# Patient Record
Sex: MALE | Race: WHITE | Employment: OTHER | URBAN - METROPOLITAN AREA
[De-identification: names, ages, dates, MRNs, and addresses within clinical notes are randomized per-mention and may not be internally consistent; named-entity substitution may affect disease eponyms.]

---

## 2017-01-16 ENCOUNTER — TELEPHONE (OUTPATIENT)
Dept: FAMILY MEDICINE CLINIC | Age: 62
End: 2017-01-16

## 2017-01-16 RX ORDER — AZITHROMYCIN 250 MG/1
TABLET, FILM COATED ORAL
Qty: 1 PACKET | Refills: 0 | Status: SHIPPED | OUTPATIENT
Start: 2017-01-16 | End: 2017-01-26

## 2017-02-10 ENCOUNTER — TELEPHONE (OUTPATIENT)
Dept: CARDIOLOGY | Age: 62
End: 2017-02-10

## 2017-02-23 ENCOUNTER — OFFICE VISIT (OUTPATIENT)
Dept: CARDIOLOGY | Age: 62
End: 2017-02-23

## 2017-02-23 VITALS
HEART RATE: 82 BPM | WEIGHT: 214 LBS | SYSTOLIC BLOOD PRESSURE: 160 MMHG | HEIGHT: 75 IN | BODY MASS INDEX: 26.61 KG/M2 | DIASTOLIC BLOOD PRESSURE: 84 MMHG

## 2017-02-23 DIAGNOSIS — E78.01 FAMILIAL HYPERCHOLESTEROLEMIA: ICD-10-CM

## 2017-02-23 DIAGNOSIS — I25.810 CORONARY ARTERY DISEASE INVOLVING CORONARY BYPASS GRAFT OF NATIVE HEART WITHOUT ANGINA PECTORIS: ICD-10-CM

## 2017-02-23 DIAGNOSIS — I10 ESSENTIAL HYPERTENSION: Primary | ICD-10-CM

## 2017-02-23 PROCEDURE — G8427 DOCREV CUR MEDS BY ELIG CLIN: HCPCS | Performed by: NUCLEAR MEDICINE

## 2017-02-23 PROCEDURE — 1036F TOBACCO NON-USER: CPT | Performed by: NUCLEAR MEDICINE

## 2017-02-23 PROCEDURE — G8419 CALC BMI OUT NRM PARAM NOF/U: HCPCS | Performed by: NUCLEAR MEDICINE

## 2017-02-23 PROCEDURE — 99213 OFFICE O/P EST LOW 20 MIN: CPT | Performed by: NUCLEAR MEDICINE

## 2017-02-23 PROCEDURE — G8598 ASA/ANTIPLAT THER USED: HCPCS | Performed by: NUCLEAR MEDICINE

## 2017-02-23 PROCEDURE — G8484 FLU IMMUNIZE NO ADMIN: HCPCS | Performed by: NUCLEAR MEDICINE

## 2017-02-23 PROCEDURE — 3017F COLORECTAL CA SCREEN DOC REV: CPT | Performed by: NUCLEAR MEDICINE

## 2017-02-23 RX ORDER — LISINOPRIL 10 MG/1
10 TABLET ORAL DAILY
Qty: 90 TABLET | Refills: 3 | Status: SHIPPED | OUTPATIENT
Start: 2017-02-23 | End: 2017-04-21 | Stop reason: SDUPTHER

## 2017-04-21 DIAGNOSIS — I10 ESSENTIAL HYPERTENSION: ICD-10-CM

## 2017-04-21 DIAGNOSIS — I25.119 CORONARY ARTERY DISEASE WITH ANGINA PECTORIS, UNSPECIFIED VESSEL OR LESION TYPE, UNSPECIFIED WHETHER NATIVE OR TRANSPLANTED HEART (HCC): ICD-10-CM

## 2017-04-21 RX ORDER — ATORVASTATIN CALCIUM 40 MG/1
40 TABLET, FILM COATED ORAL EVERY OTHER DAY
Qty: 45 TABLET | Refills: 3 | Status: SHIPPED | OUTPATIENT
Start: 2017-04-21 | End: 2017-10-23 | Stop reason: SDUPTHER

## 2017-04-21 RX ORDER — LISINOPRIL 10 MG/1
10 TABLET ORAL DAILY
Qty: 90 TABLET | Refills: 3 | Status: SHIPPED | OUTPATIENT
Start: 2017-04-21 | End: 2017-10-23 | Stop reason: SDUPTHER

## 2017-04-21 RX ORDER — PRASUGREL 10 MG/1
TABLET, FILM COATED ORAL
Qty: 90 TABLET | Refills: 3 | Status: SHIPPED | OUTPATIENT
Start: 2017-04-21 | End: 2017-10-23 | Stop reason: SDUPTHER

## 2017-05-01 RX ORDER — TAMSULOSIN HYDROCHLORIDE 0.4 MG/1
0.4 CAPSULE ORAL NIGHTLY
Qty: 90 CAPSULE | Refills: 3 | OUTPATIENT
Start: 2017-05-01

## 2017-05-13 DIAGNOSIS — Z12.5 SCREENING FOR MALIGNANT NEOPLASM OF PROSTATE: ICD-10-CM

## 2017-05-13 DIAGNOSIS — E78.00 PURE HYPERCHOLESTEROLEMIA: Primary | ICD-10-CM

## 2017-05-13 RX ORDER — TAMSULOSIN HYDROCHLORIDE 0.4 MG/1
0.4 CAPSULE ORAL NIGHTLY
Qty: 90 CAPSULE | Refills: 3 | Status: SHIPPED | OUTPATIENT
Start: 2017-05-13 | End: 2017-05-15 | Stop reason: SDUPTHER

## 2017-05-15 RX ORDER — TAMSULOSIN HYDROCHLORIDE 0.4 MG/1
0.4 CAPSULE ORAL NIGHTLY
Qty: 90 CAPSULE | Refills: 3 | Status: CANCELLED | OUTPATIENT
Start: 2017-05-15

## 2017-05-15 RX ORDER — TAMSULOSIN HYDROCHLORIDE 0.4 MG/1
0.4 CAPSULE ORAL NIGHTLY
Qty: 90 CAPSULE | Refills: 3 | Status: SHIPPED | OUTPATIENT
Start: 2017-05-15 | End: 2018-04-03 | Stop reason: SDUPTHER

## 2017-05-16 ENCOUNTER — OFFICE VISIT (OUTPATIENT)
Dept: FAMILY MEDICINE CLINIC | Age: 62
End: 2017-05-16

## 2017-05-16 VITALS
DIASTOLIC BLOOD PRESSURE: 74 MMHG | BODY MASS INDEX: 25.74 KG/M2 | RESPIRATION RATE: 16 BRPM | HEART RATE: 64 BPM | WEIGHT: 207 LBS | SYSTOLIC BLOOD PRESSURE: 126 MMHG | HEIGHT: 75 IN

## 2017-05-16 DIAGNOSIS — Z12.11 SCREEN FOR COLON CANCER: ICD-10-CM

## 2017-05-16 DIAGNOSIS — I10 ESSENTIAL HYPERTENSION: ICD-10-CM

## 2017-05-16 DIAGNOSIS — Z00.00 WELL ADULT EXAM: Primary | ICD-10-CM

## 2017-05-16 LAB
HEMOCCULT STL QL: NORMAL

## 2017-05-16 PROCEDURE — 99396 PREV VISIT EST AGE 40-64: CPT | Performed by: FAMILY MEDICINE

## 2017-05-16 PROCEDURE — 82270 OCCULT BLOOD FECES: CPT | Performed by: FAMILY MEDICINE

## 2017-05-16 ASSESSMENT — ENCOUNTER SYMPTOMS
CONSTIPATION: 0
SINUS PRESSURE: 0
SHORTNESS OF BREATH: 1

## 2017-05-22 ENCOUNTER — EMPLOYEE WELLNESS (OUTPATIENT)
Dept: OTHER | Age: 62
End: 2017-05-22

## 2017-05-22 LAB
CHOLESTEROL, TOTAL: 143 MG/DL (ref 0–199)
FASTING: YES
GLUCOSE BLD-MCNC: 105 MG/DL (ref 74–109)
HDLC SERPL-MCNC: 49 MG/DL (ref 40–90)
LDL CHOLESTEROL CALCULATED: 78 MG/DL
TRIGL SERPL-MCNC: 79 MG/DL (ref 0–199)

## 2017-06-28 ENCOUNTER — OFFICE VISIT (OUTPATIENT)
Dept: FAMILY MEDICINE CLINIC | Age: 62
End: 2017-06-28

## 2017-06-28 VITALS
DIASTOLIC BLOOD PRESSURE: 64 MMHG | HEIGHT: 75 IN | SYSTOLIC BLOOD PRESSURE: 114 MMHG | BODY MASS INDEX: 26.24 KG/M2 | WEIGHT: 211 LBS | HEART RATE: 76 BPM | RESPIRATION RATE: 14 BRPM

## 2017-06-28 DIAGNOSIS — S60.222A CONTUSION OF LEFT HAND, INITIAL ENCOUNTER: Primary | ICD-10-CM

## 2017-06-28 PROCEDURE — 99213 OFFICE O/P EST LOW 20 MIN: CPT | Performed by: FAMILY MEDICINE

## 2017-06-28 ASSESSMENT — ENCOUNTER SYMPTOMS
CONSTIPATION: 0
SHORTNESS OF BREATH: 0
SINUS PRESSURE: 0

## 2017-10-23 ENCOUNTER — OFFICE VISIT (OUTPATIENT)
Dept: CARDIOLOGY CLINIC | Age: 62
End: 2017-10-23
Payer: COMMERCIAL

## 2017-10-23 VITALS
WEIGHT: 210 LBS | HEART RATE: 59 BPM | SYSTOLIC BLOOD PRESSURE: 144 MMHG | HEIGHT: 74 IN | DIASTOLIC BLOOD PRESSURE: 70 MMHG | BODY MASS INDEX: 26.95 KG/M2

## 2017-10-23 DIAGNOSIS — I10 ESSENTIAL HYPERTENSION: ICD-10-CM

## 2017-10-23 DIAGNOSIS — I25.119 CORONARY ARTERY DISEASE WITH ANGINA PECTORIS, UNSPECIFIED VESSEL OR LESION TYPE, UNSPECIFIED WHETHER NATIVE OR TRANSPLANTED HEART (HCC): ICD-10-CM

## 2017-10-23 DIAGNOSIS — E78.01 FAMILIAL HYPERCHOLESTEROLEMIA: ICD-10-CM

## 2017-10-23 DIAGNOSIS — I25.119 CORONARY ARTERY DISEASE INVOLVING NATIVE CORONARY ARTERY OF NATIVE HEART WITH ANGINA PECTORIS (HCC): Primary | ICD-10-CM

## 2017-10-23 PROCEDURE — 93000 ELECTROCARDIOGRAM COMPLETE: CPT | Performed by: NUCLEAR MEDICINE

## 2017-10-23 PROCEDURE — 99213 OFFICE O/P EST LOW 20 MIN: CPT | Performed by: NUCLEAR MEDICINE

## 2017-10-23 RX ORDER — PRASUGREL 10 MG/1
TABLET, FILM COATED ORAL
Qty: 90 TABLET | Refills: 3 | Status: SHIPPED | OUTPATIENT
Start: 2017-10-23 | End: 2018-05-24 | Stop reason: SDUPTHER

## 2017-10-23 RX ORDER — ATORVASTATIN CALCIUM 40 MG/1
40 TABLET, FILM COATED ORAL EVERY OTHER DAY
Qty: 45 TABLET | Refills: 3 | Status: SHIPPED | OUTPATIENT
Start: 2017-10-23 | End: 2018-05-24 | Stop reason: SDUPTHER

## 2017-10-23 RX ORDER — LISINOPRIL 10 MG/1
10 TABLET ORAL DAILY
Qty: 90 TABLET | Refills: 3 | Status: SHIPPED | OUTPATIENT
Start: 2017-10-23 | End: 2018-05-24 | Stop reason: SDUPTHER

## 2017-10-23 NOTE — PROGRESS NOTES
MR and TR. Dilated ascending aorta. Family History   Problem Relation Age of Onset    Hypertension Father     Stroke Father     Heart Disease Mother     Kidney Disease Mother      Social History   Substance Use Topics    Smoking status: Former Smoker     Packs/day: 2.00     Years: 39.00     Types: Cigarettes     Quit date: 4/21/2008    Smokeless tobacco: Never Used    Alcohol use No      Current Outpatient Prescriptions   Medication Sig Dispense Refill    tamsulosin (FLOMAX) 0.4 MG capsule Take 1 capsule by mouth nightly 90 capsule 3    atorvastatin (LIPITOR) 40 MG tablet Take 1 tablet by mouth every other day 45 tablet 3    lisinopril (PRINIVIL;ZESTRIL) 10 MG tablet Take 1 tablet by mouth daily 90 tablet 3    metoprolol tartrate (LOPRESSOR) 25 MG tablet TAKE 1 TABLET TWICE A  tablet 3    prasugrel (EFFIENT) 10 MG TABS TAKE 1 TABLET DAILY 90 tablet 3    Olive Leaf Extract 500 MG CAPS Take by mouth      loratadine (CLARITIN) 10 MG tablet Take 1 tablet by mouth daily (Patient taking differently: Take 10 mg by mouth every other day ) 30 tablet 0    CPAP Machine MISC by Does not apply route Please change CPAP pressure to 10 cm H20. 1 each 0    CPAP Machine MISC by Does not apply route Please change CPAP pressure to 11 cm H20. 1 each 0    acetaminophen (TYLENOL) 500 MG tablet Take 500 mg by mouth every 6 hours as needed for Pain      therapeutic multivitamin-minerals (THERAGRAN-M) tablet Take 1 tablet by mouth daily.  aspirin 81 MG EC tablet Take 81 mg by mouth every other day        No current facility-administered medications for this visit.       Allergies   Allergen Reactions    Codeine     Darvon [Propoxyphene Hcl]      Not sure of reaction     Health Maintenance   Topic Date Due    DTaP/Tdap/Td vaccine (1 - Tdap) 01/15/1972    Diabetes screen  01/15/1993    PSA counseling  11/20/2016    Flu vaccine (1) 09/01/2017    Hepatitis C screen  05/16/2018 (Originally 1955)   [facility] Reason for Exam?     Answer: Other       Medications Prescribed:  No orders of the defined types were placed in this encounter. Discussed use, benefit, and side effects of prescribed medications. All patient questions answered. Pt voiced understanding. Instructed to continue current medications, diet and exercise. Continue risk factor modification and medical management. Patient agreed with treatment plan. Follow up as directed.     Electronically signed by Earline Ferreira MD on 10/23/2017 at 7:36 AM

## 2017-11-30 ENCOUNTER — TELEPHONE (OUTPATIENT)
Dept: FAMILY MEDICINE CLINIC | Age: 62
End: 2017-11-30

## 2017-11-30 RX ORDER — BENZONATATE 200 MG/1
200 CAPSULE ORAL 3 TIMES DAILY PRN
Qty: 20 CAPSULE | Refills: 1 | Status: SHIPPED | OUTPATIENT
Start: 2017-11-30 | End: 2018-02-26 | Stop reason: ALTCHOICE

## 2017-11-30 RX ORDER — AZITHROMYCIN 250 MG/1
TABLET, FILM COATED ORAL
Qty: 1 PACKET | Refills: 0 | Status: SHIPPED | OUTPATIENT
Start: 2017-11-30 | End: 2017-12-10

## 2017-11-30 NOTE — TELEPHONE ENCOUNTER
Sally Engel called stating that pt has a cough. Spouse diagnosis with Acute bronchitis yesterday. She started off with the cough. She is now asking for something to be called in for pt.     Send to [facility] on [address]

## 2017-12-07 ENCOUNTER — TELEPHONE (OUTPATIENT)
Dept: UROLOGY | Age: 62
End: 2017-12-07

## 2017-12-07 NOTE — TELEPHONE ENCOUNTER
Attempted to call the patient to see if he has followed up with another urology group or if he would like to schedule and appointment with our office. Last seen 01/25/16 for prostate biopsy. If the patient schedules and appointment with our office we will need to ask about Confirm MDX testing.

## 2018-02-26 ENCOUNTER — HOSPITAL ENCOUNTER (EMERGENCY)
Age: 63
Discharge: HOME | End: 2018-02-26
Attending: EMERGENCY MEDICINE
Payer: COMMERCIAL

## 2018-02-26 ENCOUNTER — OFFICE VISIT (OUTPATIENT)
Dept: FAMILY MEDICINE CLINIC | Age: 63
End: 2018-02-26

## 2018-02-26 VITALS
HEIGHT: 75 IN | DIASTOLIC BLOOD PRESSURE: 85 MMHG | WEIGHT: 209 LBS | SYSTOLIC BLOOD PRESSURE: 140 MMHG | TEMPERATURE: 98.4 F | RESPIRATION RATE: 18 BRPM | BODY MASS INDEX: 25.99 KG/M2 | OXYGEN SATURATION: 98 % | HEART RATE: 59 BPM

## 2018-02-26 VITALS
SYSTOLIC BLOOD PRESSURE: 122 MMHG | WEIGHT: 209.13 LBS | HEIGHT: 74 IN | RESPIRATION RATE: 16 BRPM | HEART RATE: 64 BPM | DIASTOLIC BLOOD PRESSURE: 64 MMHG | BODY MASS INDEX: 26.84 KG/M2

## 2018-02-26 DIAGNOSIS — S61.211A LACERATION OF LEFT INDEX FINGER, FOREIGN BODY PRESENCE UNSPECIFIED, NAIL DAMAGE STATUS UNSPECIFIED, INITIAL ENCOUNTER: Primary | ICD-10-CM

## 2018-02-26 DIAGNOSIS — S61.209A AVULSION, FINGER TIP, INITIAL ENCOUNTER: Primary | ICD-10-CM

## 2018-02-26 PROCEDURE — 99213 OFFICE O/P EST LOW 20 MIN: CPT | Performed by: FAMILY MEDICINE

## 2018-02-26 PROCEDURE — L3913 HFO W/O JOINTS CF: HCPCS

## 2018-02-26 PROCEDURE — 6370000000 HC RX 637 (ALT 250 FOR IP): Performed by: EMERGENCY MEDICINE

## 2018-02-26 PROCEDURE — 99282 EMERGENCY DEPT VISIT SF MDM: CPT

## 2018-02-26 RX ADMIN — GELATIN ABSORBABLE SPONGE 12-7 MM 1 EACH: 12-7 MISC at 13:11

## 2018-02-26 ASSESSMENT — PAIN DESCRIPTION - ORIENTATION: ORIENTATION: LEFT

## 2018-02-26 ASSESSMENT — ENCOUNTER SYMPTOMS
SORE THROAT: 0
ABDOMINAL PAIN: 0
DIARRHEA: 0
VOMITING: 0
EYE REDNESS: 0
COUGH: 0
WHEEZING: 0
BACK PAIN: 0
SHORTNESS OF BREATH: 0
RHINORRHEA: 0
NAUSEA: 0
EYE DISCHARGE: 0

## 2018-02-26 ASSESSMENT — PAIN SCALES - GENERAL: PAINLEVEL_OUTOF10: 3

## 2018-02-26 ASSESSMENT — PAIN DESCRIPTION - LOCATION: LOCATION: FINGER (COMMENT WHICH ONE)

## 2018-02-26 ASSESSMENT — PAIN DESCRIPTION - DESCRIPTORS: DESCRIPTORS: DISCOMFORT

## 2018-02-26 ASSESSMENT — PAIN DESCRIPTION - PAIN TYPE: TYPE: ACUTE PAIN

## 2018-02-26 ASSESSMENT — PAIN DESCRIPTION - FREQUENCY: FREQUENCY: CONTINUOUS

## 2018-02-26 NOTE — ED PROVIDER NOTES
urine volume, difficulty urinating and dysuria. Musculoskeletal: Negative for arthralgias, back pain, joint swelling and neck pain. Skin: Positive for wound (laceration to the left index finger). Negative for pallor and rash. Allergic/Immunologic: Negative for environmental allergies. Neurological: Negative for dizziness, syncope, weakness, light-headedness and headaches. Hematological: Negative for adenopathy. Psychiatric/Behavioral: Negative for agitation, confusion, dysphoric mood and suicidal ideas. The patient is not nervous/anxious. PAST MEDICAL HISTORY    has a past medical history of Bleeding tendency ([address] CAD (coronary artery disease); Chest pain; Fatigue; HLD (hyperlipidemia); Hypertension; MI (myocardial infarction); Snores; and SOB (shortness of breath). SURGICAL HISTORY      has a past surgical history that includes transthoracic echocardiogram (3-31-11); Cardiac catheterization (4-21-08); Coronary artery bypass graft (777702); Tonsillectomy; hernia repair; knee surgery; cardiovascular stress test (4-01-11); transthoracic echocardiogram (4-21-08); Coronary angioplasty with stent (6-27-13); and Coronary angioplasty with stent (10/30/2016). CURRENT MEDICATIONS       Previous Medications    ACETAMINOPHEN (TYLENOL) 500 MG TABLET    Take 500 mg by mouth every 6 hours as needed for Pain    ASPIRIN 81 MG EC TABLET    Take 81 mg by mouth every other day     ATORVASTATIN (LIPITOR) 40 MG TABLET    Take 1 tablet by mouth every other day    CPAP MACHINE MISC    by Does not apply route Please change CPAP pressure to 10 cm H20. CPAP MACHINE MISC    by Does not apply route Please change CPAP pressure to 11 cm H20.     LISINOPRIL (PRINIVIL;ZESTRIL) 10 MG TABLET    Take 1 tablet by mouth daily    LORATADINE (CLARITIN) 10 MG TABLET    Take 1 tablet by mouth daily    METOPROLOL TARTRATE (LOPRESSOR) 25 MG TABLET    TAKE 1 TABLET TWICE A DAY    OLIVE LEAF EXTRACT 500 MG CAPS    Take by mouth PRASUGREL (EFFIENT) 10 MG TABS    TAKE 1 TABLET DAILY    TAMSULOSIN (FLOMAX) 0.4 MG CAPSULE    Take 1 capsule by mouth nightly    THERAPEUTIC MULTIVITAMIN-MINERALS (THERAGRAN-M) TABLET    Take 1 tablet by mouth daily. ALLERGIES     is allergic to codeine and darvon [propoxyphene hcl]. FAMILY HISTORY     indicated that his mother is . He indicated that his father is . family history includes Heart Disease in his mother; Hypertension in his father; Kidney Disease in his mother; Stroke in his father. SOCIAL HISTORY      reports that he quit smoking about 9 years ago. His smoking use included Cigarettes. He has a 78.00 pack-year smoking history. He has never used smokeless tobacco. He reports that he does not drink alcohol or use drugs. PHYSICAL EXAM     INITIAL VITALS:  height is 6' 3\" (1.905 m) and weight is 209 lb (94.8 kg). His oral temperature is 98.4 °F (36.9 °C). His blood pressure is 140/85 (abnormal) and his pulse is 59. His respiration is 18 and oxygen saturation is 98%. Physical Exam   Constitutional: He is oriented to person, place, and time. He appears well-developed and well-nourished. HENT:   Head: Normocephalic and atraumatic. Right Ear: External ear normal.   Left Ear: External ear normal.   Eyes: Conjunctivae are normal. Right eye exhibits no discharge. Left eye exhibits no discharge. No scleral icterus. Neck: Normal range of motion. Neck supple. No JVD present. Cardiovascular: Normal rate, regular rhythm and normal heart sounds. Exam reveals no gallop and no friction rub. No murmur heard. Pulmonary/Chest: Effort normal. No stridor. No respiratory distress. He has no wheezes. He has no rales. Abdominal: Soft. He exhibits no distension. There is no tenderness. There is no rebound and no guarding. Musculoskeletal: Normal range of motion. He exhibits no edema. Neurological: He is alert and oriented to person, place, and time.  He exhibits normal worsening symptoms. CRITICAL CARE:   None     CONSULTS:  None    PROCEDURES:  None    FINAL IMPRESSION      1. Avulsion, finger tip, initial encounter          DISPOSITION/PLAN   Discharge home in stable condition    PATIENT REFERRED TO:  Liya Lemus MD  [address] [place] [address] Neel Meza  [phone]    Schedule an appointment as soon as possible for a visit in 3 days  For wound re-check      DISCHARGE MEDICATIONS:  New Prescriptions    No medications on file       (Please note that portions of this note were completed with a voice recognition program.  Efforts were made to edit the dictations but occasionally words are mis-transcribed.)    The patient was given an opportunity to see the Emergency Attending. The patient voiced understanding that I was a Mid-Level Provider and was in agreement with being seen independently by myself. Scribe:  Anthony Zaman 2/26/18 1:10 PM Scribing for and in the presence of KIMI Robbins. Signed by: Reanna Villa, 02/26/18 2:44 PM    Provider:  I personally performed the services described in the documentation, reviewed and edited the documentation which was dictated to the scribe in my presence, and it accurately records my words and actions. KIMI Thomas 2/26/18 2:44 PM      VAL Prather  02/26/18 [id number]

## 2018-02-26 NOTE — ED NOTES
Splint was applied to index finger and patient was educated on splint.       Rosetta Weeks RN  02/26/18 [id number]

## 2018-03-11 ASSESSMENT — ENCOUNTER SYMPTOMS
SHORTNESS OF BREATH: 0
CONSTIPATION: 0
SINUS PRESSURE: 0

## 2018-03-12 NOTE — PROGRESS NOTES
Subjective:      Patient ID: Malinda Coronado is a 70 y.o. male. HPI  He cut his finger last evening at home and it continues bleeding through the dressing  Review of Systems   Constitutional: Negative for fatigue. HENT: Negative for sinus pressure. Eyes: Negative for visual disturbance. Respiratory: Negative for shortness of breath. Cardiovascular: Negative for chest pain. Gastrointestinal: Negative for constipation. Genitourinary: Negative. Musculoskeletal: Negative for arthralgias. Skin: Positive for wound. Negative for rash. Neurological: Negative for headaches. The patient's medications, allergies, past medical problems, surgical, social, and family histories were reviewed and updated as needed. Objective:   Physical Exam   Constitutional: He is oriented to person, place, and time. He appears well-developed and well-nourished. No distress. HENT:   Head: Normocephalic and atraumatic. Eyes: Conjunctivae are normal. No scleral icterus. Neck: No tracheal deviation present. Cardiovascular: Normal rate. Pulmonary/Chest: Effort normal.   Musculoskeletal: He exhibits no edema. Laceration to finger with evidence of continued bleeding   Neurological: He is alert and oriented to person, place, and time. Skin: Skin is warm and dry. Psychiatric: He has a normal mood and affect. His behavior is normal.   Blood pressure 122/64, pulse 64, resp. rate 16, height 6' 2\" (1.88 m), weight 209 lb 2 oz (94.9 kg). Assessment:      1.  Laceration of left index finger, foreign body presence unspecified, nail damage status unspecified, initial encounter             Plan:      I told him he must go to the ED

## 2018-03-20 VITALS — WEIGHT: 204 LBS | BODY MASS INDEX: 25.5 KG/M2

## 2018-04-03 DIAGNOSIS — I10 ESSENTIAL HYPERTENSION: ICD-10-CM

## 2018-04-03 DIAGNOSIS — I25.119 CORONARY ARTERY DISEASE WITH ANGINA PECTORIS, UNSPECIFIED VESSEL OR LESION TYPE, UNSPECIFIED WHETHER NATIVE OR TRANSPLANTED HEART (HCC): ICD-10-CM

## 2018-04-03 RX ORDER — PRASUGREL HCL 10 MG
TABLET ORAL
Qty: 90 TABLET | Refills: 1 | Status: SHIPPED | OUTPATIENT
Start: 2018-04-03 | End: 2018-05-24 | Stop reason: SDUPTHER

## 2018-04-03 RX ORDER — TAMSULOSIN HYDROCHLORIDE 0.4 MG/1
0.4 CAPSULE ORAL NIGHTLY
Qty: 90 CAPSULE | Refills: 0 | Status: SHIPPED | OUTPATIENT
Start: 2018-04-03 | End: 2018-05-24 | Stop reason: SDUPTHER

## 2018-05-18 ENCOUNTER — OFFICE VISIT (OUTPATIENT)
Dept: PULMONOLOGY | Age: 63
End: 2018-05-18
Payer: COMMERCIAL

## 2018-05-18 VITALS
BODY MASS INDEX: 26.78 KG/M2 | RESPIRATION RATE: 16 BRPM | HEIGHT: 75 IN | DIASTOLIC BLOOD PRESSURE: 80 MMHG | OXYGEN SATURATION: 91 % | HEART RATE: 53 BPM | SYSTOLIC BLOOD PRESSURE: 116 MMHG | WEIGHT: 215.4 LBS

## 2018-05-18 DIAGNOSIS — G47.33 OBSTRUCTIVE SLEEP APNEA ON CPAP: Primary | ICD-10-CM

## 2018-05-18 DIAGNOSIS — Z99.89 OBSTRUCTIVE SLEEP APNEA ON CPAP: Primary | ICD-10-CM

## 2018-05-18 PROCEDURE — 99213 OFFICE O/P EST LOW 20 MIN: CPT | Performed by: PHYSICIAN ASSISTANT

## 2018-05-18 ASSESSMENT — ENCOUNTER SYMPTOMS
NAUSEA: 0
GASTROINTESTINAL NEGATIVE: 1
WHEEZING: 0
SHORTNESS OF BREATH: 0
COUGH: 0
RESPIRATORY NEGATIVE: 1
SPUTUM PRODUCTION: 0
SORE THROAT: 0
ORTHOPNEA: 0
HEARTBURN: 0
EYES NEGATIVE: 1
SINUS PAIN: 0

## 2018-05-24 ENCOUNTER — OFFICE VISIT (OUTPATIENT)
Dept: FAMILY MEDICINE CLINIC | Age: 63
End: 2018-05-24

## 2018-05-24 VITALS
DIASTOLIC BLOOD PRESSURE: 84 MMHG | BODY MASS INDEX: 26.68 KG/M2 | SYSTOLIC BLOOD PRESSURE: 130 MMHG | HEIGHT: 75 IN | RESPIRATION RATE: 64 BRPM | HEART RATE: 64 BPM | WEIGHT: 214.6 LBS

## 2018-05-24 DIAGNOSIS — I25.119 CORONARY ARTERY DISEASE WITH ANGINA PECTORIS, UNSPECIFIED VESSEL OR LESION TYPE, UNSPECIFIED WHETHER NATIVE OR TRANSPLANTED HEART (HCC): ICD-10-CM

## 2018-05-24 DIAGNOSIS — R39.12 BENIGN PROSTATIC HYPERPLASIA WITH WEAK URINARY STREAM: ICD-10-CM

## 2018-05-24 DIAGNOSIS — N40.1 BENIGN PROSTATIC HYPERPLASIA WITH WEAK URINARY STREAM: ICD-10-CM

## 2018-05-24 DIAGNOSIS — Z12.5 SCREENING FOR MALIGNANT NEOPLASM OF PROSTATE: Primary | ICD-10-CM

## 2018-05-24 DIAGNOSIS — I10 ESSENTIAL HYPERTENSION: ICD-10-CM

## 2018-05-24 PROCEDURE — 99397 PER PM REEVAL EST PAT 65+ YR: CPT | Performed by: FAMILY MEDICINE

## 2018-05-24 RX ORDER — ATORVASTATIN CALCIUM 40 MG/1
40 TABLET, FILM COATED ORAL EVERY OTHER DAY
Qty: 45 TABLET | Refills: 3 | Status: SHIPPED | OUTPATIENT
Start: 2018-05-24 | End: 2018-10-22 | Stop reason: SDUPTHER

## 2018-05-24 RX ORDER — PRASUGREL 10 MG/1
TABLET, FILM COATED ORAL
Qty: 90 TABLET | Refills: 3 | Status: SHIPPED | OUTPATIENT
Start: 2018-05-24 | End: 2018-10-22 | Stop reason: SDUPTHER

## 2018-05-24 RX ORDER — TAMSULOSIN HYDROCHLORIDE 0.4 MG/1
0.4 CAPSULE ORAL NIGHTLY
Qty: 90 CAPSULE | Refills: 3 | Status: SHIPPED | OUTPATIENT
Start: 2018-05-24 | End: 2019-05-24 | Stop reason: SDUPTHER

## 2018-05-24 RX ORDER — LISINOPRIL 10 MG/1
10 TABLET ORAL DAILY
Qty: 90 TABLET | Refills: 3 | Status: SHIPPED | OUTPATIENT
Start: 2018-05-24 | End: 2018-10-22 | Stop reason: SDUPTHER

## 2018-05-24 ASSESSMENT — ENCOUNTER SYMPTOMS
SHORTNESS OF BREATH: 1
COUGH: 1
SINUS PRESSURE: 0
CONSTIPATION: 0

## 2018-05-24 ASSESSMENT — PATIENT HEALTH QUESTIONNAIRE - PHQ9
SUM OF ALL RESPONSES TO PHQ9 QUESTIONS 1 & 2: 0
SUM OF ALL RESPONSES TO PHQ QUESTIONS 1-9: 0
2. FEELING DOWN, DEPRESSED OR HOPELESS: 0
1. LITTLE INTEREST OR PLEASURE IN DOING THINGS: 0

## 2018-05-31 LAB
CHOLESTEROL, TOTAL: 165 MG/DL (ref 0–199)
FASTING: YES
GLUCOSE BLD-MCNC: 87 MG/DL (ref 74–109)
HDLC SERPL-MCNC: 45 MG/DL (ref 40–90)
LDL CHOLESTEROL CALCULATED: 88 MG/DL
TRIGL SERPL-MCNC: 160 MG/DL (ref 0–199)

## 2018-06-01 ENCOUNTER — EMPLOYEE WELLNESS (OUTPATIENT)
Dept: OTHER | Age: 63
End: 2018-06-01

## 2018-06-11 VITALS — WEIGHT: 213 LBS | BODY MASS INDEX: 26.62 KG/M2

## 2018-10-21 DIAGNOSIS — I10 ESSENTIAL HYPERTENSION: ICD-10-CM

## 2018-10-21 DIAGNOSIS — I25.119 CORONARY ARTERY DISEASE WITH ANGINA PECTORIS, UNSPECIFIED VESSEL OR LESION TYPE, UNSPECIFIED WHETHER NATIVE OR TRANSPLANTED HEART (HCC): ICD-10-CM

## 2018-10-22 ENCOUNTER — OFFICE VISIT (OUTPATIENT)
Dept: CARDIOLOGY CLINIC | Age: 63
End: 2018-10-22
Payer: COMMERCIAL

## 2018-10-22 VITALS
BODY MASS INDEX: 28.63 KG/M2 | DIASTOLIC BLOOD PRESSURE: 80 MMHG | SYSTOLIC BLOOD PRESSURE: 136 MMHG | HEART RATE: 71 BPM | HEIGHT: 73 IN | WEIGHT: 216 LBS

## 2018-10-22 DIAGNOSIS — I25.119 CORONARY ARTERY DISEASE INVOLVING NATIVE CORONARY ARTERY OF NATIVE HEART WITH ANGINA PECTORIS (HCC): Primary | ICD-10-CM

## 2018-10-22 DIAGNOSIS — E78.01 FAMILIAL HYPERCHOLESTEROLEMIA: ICD-10-CM

## 2018-10-22 DIAGNOSIS — I25.119 CORONARY ARTERY DISEASE WITH ANGINA PECTORIS, UNSPECIFIED VESSEL OR LESION TYPE, UNSPECIFIED WHETHER NATIVE OR TRANSPLANTED HEART (HCC): ICD-10-CM

## 2018-10-22 DIAGNOSIS — I10 ESSENTIAL HYPERTENSION: ICD-10-CM

## 2018-10-22 PROCEDURE — 99213 OFFICE O/P EST LOW 20 MIN: CPT | Performed by: NUCLEAR MEDICINE

## 2018-10-22 PROCEDURE — 93000 ELECTROCARDIOGRAM COMPLETE: CPT | Performed by: NUCLEAR MEDICINE

## 2018-10-22 RX ORDER — ATORVASTATIN CALCIUM 40 MG/1
40 TABLET, FILM COATED ORAL EVERY OTHER DAY
Qty: 45 TABLET | Refills: 3 | Status: SHIPPED | OUTPATIENT
Start: 2018-10-22 | End: 2020-04-29 | Stop reason: SDUPTHER

## 2018-10-22 RX ORDER — PRASUGREL 10 MG/1
TABLET, FILM COATED ORAL
Qty: 90 TABLET | Refills: 3 | Status: SHIPPED | OUTPATIENT
Start: 2018-10-22 | End: 2019-06-12 | Stop reason: SDUPTHER

## 2018-10-22 RX ORDER — LISINOPRIL 10 MG/1
10 TABLET ORAL DAILY
Qty: 90 TABLET | Refills: 3 | Status: SHIPPED | OUTPATIENT
Start: 2018-10-22 | End: 2019-10-28 | Stop reason: SDUPTHER

## 2018-10-22 NOTE — PROGRESS NOTES
[address]  [address]  [place] [address]  Dept: [phone]  Dept Fax: [phone]  Loc: [phone]    Visit Date: 10/22/2018    Sam Rodriguez is a 70 y.o. male who presents todayfor:  Chief Complaint   Patient presents with    Check-Up    Hypertension    Coronary Artery Disease    Hyperlipidemia     Known CABG 2008  Does have some dyspnea on exertion  Some chest heaviness at times  Not exertional   No use of nitro   Bp is stable   On statins    HPI:  HPI  Past Medical History:   Diagnosis Date    Bleeding tendency ([address]     Patient states \"From medications\"    CAD (coronary artery disease)     Chest pain     Fatigue     HLD (hyperlipidemia)     Hypertension     MI (myocardial infarction) ([address]     Snores     SOB (shortness of breath)       Past Surgical History:   Procedure Laterality Date    CARDIAC CATHETERIZATION  4-21-08    Significant vasculopathy w/ severe disease in the coronary arteries, pretty much aneurysmal and diffuse in nature everywhere, involving pretty much every segment of coronary arteries. Critical stenosis of the left circ. which is likely to be culprit vessel. Significant aortic disease as well. Mild LV dysfunction.  CARDIOVASCULAR STRESS TEST  4-01-11    Moderate inferolateral infarct w/o obvious significant stress induced ischemia. EF 58%. Mild wall motion abnormality.  CORONARY ANGIOPLASTY WITH STENT PLACEMENT  6-27-13    CORONARY ANGIOPLASTY WITH STENT PLACEMENT  10/30/2016    3 stent by Dr Kathy Reyes  [id number]   [facility]      TRANSTHORACIC ECHOCARDIOGRAM  3-31-11    LV size normal. Systolic function mildly reduced. EF 50-55%. Mild diffuse hypokinesis. Wall thickness mildly increased. Mild concentric LVH.  TRANSTHORACIC ECHOCARDIOGRAM  4-21-08    Global LV dysfunction. EF 40%. Calcified AV w/o obvious stenosis.  Mild MR and TR. Dilated ascending aorta. Family History   Problem Relation Age of Onset    Hypertension Father     Stroke Father     Heart Disease Mother     Kidney Disease Mother      Social History   Substance Use Topics    Smoking status: Former Smoker     Packs/day: 2.00     Years: 39.00     Types: Cigarettes     Quit date: 4/21/2008    Smokeless tobacco: Never Used    Alcohol use No      Current Outpatient Prescriptions   Medication Sig Dispense Refill    tamsulosin (FLOMAX) 0.4 MG capsule Take 1 capsule by mouth nightly 90 capsule 3    metoprolol tartrate (LOPRESSOR) 25 MG tablet TAKE 1 TABLET TWICE A  tablet 3    lisinopril (PRINIVIL;ZESTRIL) 10 MG tablet Take 1 tablet by mouth daily 90 tablet 3    prasugrel (EFFIENT) 10 MG TABS TAKE ONE TABLET BY MOUTH ONE TIME A DAY 90 tablet 3    atorvastatin (LIPITOR) 40 MG tablet Take 1 tablet by mouth every other day 45 tablet 3    Coenzyme Q10 (CO Q 10 PO) Take 200 mg by mouth      loratadine (CLARITIN) 10 MG tablet Take 1 tablet by mouth daily 30 tablet 0    CPAP Machine MISC by Does not apply route Please change CPAP pressure to 10 cm H20. 1 each 0    acetaminophen (TYLENOL) 500 MG tablet Take 500 mg by mouth every 6 hours as needed for Pain      therapeutic multivitamin-minerals (THERAGRAN-M) tablet Take 1 tablet by mouth daily.  aspirin 81 MG EC tablet Take 81 mg by mouth every other day        No current facility-administered medications for this visit.       Allergies   Allergen Reactions    Codeine     Darvon [Propoxyphene Hcl]      Not sure of reaction     Health Maintenance   Topic Date Due    DTaP/Tdap/Td vaccine (1 - Tdap) 01/15/1972    Shingles Vaccine (1 of 2 - 2 Dose Series) 01/15/2003    PSA counseling  11/20/2016    Potassium monitoring  10/31/2017    Creatinine monitoring  10/31/2017    Flu vaccine (1) 09/01/2018    Pneumococcal low/med risk (1 of 2 - PCV13) 05/24/2019 (Originally 1/15/2018)    Hepatitis C screen

## 2019-01-29 ENCOUNTER — TELEPHONE (OUTPATIENT)
Dept: FAMILY MEDICINE CLINIC | Age: 64
End: 2019-01-29

## 2019-01-29 RX ORDER — AMOXICILLIN AND CLAVULANATE POTASSIUM 875; 125 MG/1; MG/1
1 TABLET, FILM COATED ORAL 2 TIMES DAILY
Qty: 20 TABLET | Refills: 0 | Status: SHIPPED | OUTPATIENT
Start: 2019-01-29 | End: 2019-02-08

## 2019-03-01 ENCOUNTER — OFFICE VISIT (OUTPATIENT)
Dept: FAMILY MEDICINE CLINIC | Age: 64
End: 2019-03-01

## 2019-03-01 VITALS — BODY MASS INDEX: 28.5 KG/M2 | HEIGHT: 73 IN

## 2019-03-01 DIAGNOSIS — I25.119 CORONARY ARTERY DISEASE WITH ANGINA PECTORIS, UNSPECIFIED VESSEL OR LESION TYPE, UNSPECIFIED WHETHER NATIVE OR TRANSPLANTED HEART (HCC): ICD-10-CM

## 2019-03-01 DIAGNOSIS — L03.111 CELLULITIS OF RIGHT AXILLA: Primary | ICD-10-CM

## 2019-03-01 PROCEDURE — 99213 OFFICE O/P EST LOW 20 MIN: CPT | Performed by: FAMILY MEDICINE

## 2019-03-01 RX ORDER — AMOXICILLIN AND CLAVULANATE POTASSIUM 875; 125 MG/1; MG/1
1 TABLET, FILM COATED ORAL 2 TIMES DAILY
Qty: 20 TABLET | Refills: 0 | Status: SHIPPED | OUTPATIENT
Start: 2019-03-01 | End: 2019-03-11

## 2019-03-01 ASSESSMENT — ENCOUNTER SYMPTOMS
SHORTNESS OF BREATH: 0
SINUS PRESSURE: 0
CONSTIPATION: 0

## 2019-03-01 ASSESSMENT — PATIENT HEALTH QUESTIONNAIRE - PHQ9
SUM OF ALL RESPONSES TO PHQ QUESTIONS 1-9: 0
1. LITTLE INTEREST OR PLEASURE IN DOING THINGS: 0
SUM OF ALL RESPONSES TO PHQ9 QUESTIONS 1 & 2: 0
SUM OF ALL RESPONSES TO PHQ QUESTIONS 1-9: 0
2. FEELING DOWN, DEPRESSED OR HOPELESS: 0

## 2019-03-04 ENCOUNTER — OFFICE VISIT (OUTPATIENT)
Dept: FAMILY MEDICINE CLINIC | Age: 64
End: 2019-03-04

## 2019-03-04 VITALS
DIASTOLIC BLOOD PRESSURE: 68 MMHG | SYSTOLIC BLOOD PRESSURE: 134 MMHG | WEIGHT: 213.5 LBS | BODY MASS INDEX: 28.3 KG/M2 | RESPIRATION RATE: 13 BRPM | HEIGHT: 73 IN | HEART RATE: 80 BPM

## 2019-03-04 DIAGNOSIS — L02.411 CUTANEOUS ABSCESS OF RIGHT AXILLA: Primary | ICD-10-CM

## 2019-03-04 PROCEDURE — 10061 I&D ABSCESS COMP/MULTIPLE: CPT | Performed by: FAMILY MEDICINE

## 2019-03-04 ASSESSMENT — ENCOUNTER SYMPTOMS
SINUS PRESSURE: 0
CONSTIPATION: 0
SHORTNESS OF BREATH: 0

## 2019-03-05 ENCOUNTER — NURSE ONLY (OUTPATIENT)
Dept: FAMILY MEDICINE CLINIC | Age: 64
End: 2019-03-05

## 2019-03-05 DIAGNOSIS — L02.411 CUTANEOUS ABSCESS OF RIGHT AXILLA: Primary | ICD-10-CM

## 2019-03-07 ENCOUNTER — NURSE ONLY (OUTPATIENT)
Dept: FAMILY MEDICINE CLINIC | Age: 64
End: 2019-03-07

## 2019-03-07 DIAGNOSIS — L02.411 CUTANEOUS ABSCESS OF RIGHT AXILLA: Primary | ICD-10-CM

## 2019-03-12 ENCOUNTER — NURSE ONLY (OUTPATIENT)
Dept: FAMILY MEDICINE CLINIC | Age: 64
End: 2019-03-12

## 2019-03-12 DIAGNOSIS — L02.411 CUTANEOUS ABSCESS OF RIGHT AXILLA: Primary | ICD-10-CM

## 2019-05-06 ENCOUNTER — EMPLOYEE WELLNESS (OUTPATIENT)
Dept: OTHER | Age: 64
End: 2019-05-06

## 2019-05-06 LAB
CHOLESTEROL, TOTAL: 171 MG/DL (ref 0–199)
FASTING: YES
GLUCOSE BLD-MCNC: 100 MG/DL (ref 74–109)
HDLC SERPL-MCNC: 48 MG/DL (ref 40–90)
LDL CHOLESTEROL CALCULATED: 103 MG/DL
TRIGL SERPL-MCNC: 102 MG/DL (ref 0–199)

## 2019-05-10 ENCOUNTER — HOSPITAL ENCOUNTER (EMERGENCY)
Age: 64
Discharge: HOME | End: 2019-05-10
Attending: EMERGENCY MEDICINE
Payer: COMMERCIAL

## 2019-05-10 VITALS
BODY MASS INDEX: 26.56 KG/M2 | HEART RATE: 55 BPM | SYSTOLIC BLOOD PRESSURE: 116 MMHG | TEMPERATURE: 97.7 F | DIASTOLIC BLOOD PRESSURE: 74 MMHG | OXYGEN SATURATION: 94 % | WEIGHT: 207 LBS | RESPIRATION RATE: 16 BRPM | HEIGHT: 74 IN

## 2019-05-10 DIAGNOSIS — R04.0 EPISTAXIS: Primary | ICD-10-CM

## 2019-05-10 PROCEDURE — 2500000003 HC RX 250 WO HCPCS: Performed by: EMERGENCY MEDICINE

## 2019-05-10 PROCEDURE — 99283 EMERGENCY DEPT VISIT LOW MDM: CPT

## 2019-05-10 PROCEDURE — 6370000000 HC RX 637 (ALT 250 FOR IP): Performed by: EMERGENCY MEDICINE

## 2019-05-10 RX ORDER — TRANEXAMIC ACID 100 MG/ML
1000 INJECTION, SOLUTION INTRAVENOUS ONCE
Status: COMPLETED | OUTPATIENT
Start: 2019-05-10 | End: 2019-05-10

## 2019-05-10 RX ORDER — OXYMETAZOLINE HYDROCHLORIDE 0.05 G/100ML
1 SPRAY NASAL ONCE
Status: COMPLETED | OUTPATIENT
Start: 2019-05-10 | End: 2019-05-10

## 2019-05-10 RX ADMIN — TRANEXAMIC ACID 1000 MG: 1 INJECTION, SOLUTION INTRAVENOUS at 07:50

## 2019-05-10 RX ADMIN — OXYMETAZOLINE HYDROCHLORIDE 1 SPRAY: 5 SPRAY NASAL at 07:45

## 2019-05-10 NOTE — ED NOTES
Dr Romayne Ochs at bedside, administered TXA left nare and clamp placed. Pt given suction to clear his mouth as needed. Will re check bleeding in 10-15 minutes.  Pt and family verbalized understanding     Cristela Ball RN  05/10/19 [id number]

## 2019-05-10 NOTE — CARE COORDINATION
ED Care Transition    5/10/2019    Patient Name: Pedro Linton   : 1955  MRN: [id number]    LOPEZ Score: 3  PCP: Melania Munroe MD   Specialist: Yes: Airam SEGURA, Dr. Minerva Kramer  Active ACC/CTC: no                       Utilization Review:  ED visits:  1 - epistaxis   Admissions: 0    Problem List:  Patient Active Problem List   Diagnosis    Coronary artery disease involving native coronary artery of native heart    NSTEMI (non-ST elevated myocardial infarction) [facility]    Obstructive sleep apnea on CPAP    Pure hypercholesterolemia    Acute coronary syndrome ([facility]    Non Q wave myocardial infarction ([facility]    Essential hypertension    Coronary artery disease with angina pectoris ([facility]    Familial hypercholesterolemia     Summary:  Met with Sasha Valentine, introduced self/role. Presented to ED for evaluation of epistaxis. Patient reports having frequent nose bleeds recently. Educated on [address], verbalized understanding. Advised to follow up with Dr. Markie Jean Baptiste, patient declined scheduling appointment at this time, states he will call if needed. Denies further needs/assistance/concerns/questions at this time.        Follow up appointments:    Future Appointments   Date Time Provider Alexandre Murray   2019  8:15 AM Rex Streeter PA-C Pul Med [address]   10/28/2019 12:00 PM Dann Burnham MD  [facility] - [facility]       Review Due Health Maintenance:  Health Maintenance Due   Topic Date Due    DTaP/Tdap/Td vaccine (1 - Tdap) 01/15/1972    Shingles Vaccine (1 of 2) 01/15/2003    PSA counseling  2016    Potassium monitoring  10/31/2017    Creatinine monitoring  10/31/2017    Pneumococcal 65+ years Vaccine (1 of 2 - PCV13) 01/15/2018     MALCOLM Mckeon, BALTA[place]  [phone]

## 2019-05-10 NOTE — ED NOTES
Pt arrived ambulatory to ED with c/o epistaxis. Pt states it started around 6am and he was unable to get it to stop. Pt states he has a history of epistaxis but has always been able to get them to stop at home before. Pt denies any pain. Pt alert and oriented X4. VSS. Respirations easy and unlabored. Will continue to monitor.       Lexx Serra RN  05/10/19 [id number]

## 2019-05-10 NOTE — ED NOTES
Pt sitting up in bed. Respirations easy and unlabored. Pt still using suction to clear mouth as needed. Pt denies any pain or needs at this time. Pt informed on plan of care. Will continue to monitor.       Anna Marie Brennan RN  05/10/19 0043

## 2019-05-10 NOTE — ED PROVIDER NOTES
[facility] [address] [address] EMERGENCY DEPT  EMERGENCY DEPARTMENT     Pt Name: Gerry Bill  MRN: [id number]  [place] 1955  Date of evaluation: 5/10/2019  Provider: Lacey Ordoñez DO, [address]       Chief Complaint   Patient presents with    Epistaxis       HISTORY OF PRESENT ILLNESS    Gerry Bill is a 75 y.o. male who presents to the emergency department from home with complaints of sudden onset of epistaxis from the left naris today. The patient is anticoagulated for history of CAD. No lightheadedness no dizziness, no other associated symptoms      Triage notes and Nursing notes were reviewed by myself. Any discrepancies are addressed above. PAST MEDICAL HISTORY     Past Medical History:   Diagnosis Date    Bleeding tendency [facility])     Patient states \"From medications\"    CAD (coronary artery disease)     Chest pain     Fatigue     HLD (hyperlipidemia)     Hypertension     MI (myocardial infarction) ([facility] [address]     Snores     SOB (shortness of breath)        SURGICAL HISTORY       Past Surgical History:   Procedure Laterality Date    CARDIAC CATHETERIZATION  4-21-08    Significant vasculopathy w/ severe disease in the coronary arteries, pretty much aneurysmal and diffuse in nature everywhere, involving pretty much every segment of coronary arteries. Critical stenosis of the left circ. which is likely to be culprit vessel. Significant aortic disease as well. Mild LV dysfunction.  CARDIOVASCULAR STRESS TEST  4-01-11    Moderate inferolateral infarct w/o obvious significant stress induced ischemia. EF 58%. Mild wall motion abnormality.  CORONARY ANGIOPLASTY WITH STENT PLACEMENT  6-27-13    CORONARY ANGIOPLASTY WITH STENT PLACEMENT  10/30/2016    3 stent by Dr Marely Moreno  [id number]   [facility]      TRANSTHORACIC ECHOCARDIOGRAM  3-31-11    LV size normal. Systolic function mildly reduced. EF 50-55%.  Mild diffuse hypokinesis. Wall thickness mildly increased. Mild concentric LVH.  TRANSTHORACIC ECHOCARDIOGRAM  4-21-08    Global LV dysfunction. EF 40%. Calcified AV w/o obvious stenosis. Mild MR and TR. Dilated ascending aorta. CURRENT MEDICATIONS       Previous Medications    ASPIRIN 81 MG EC TABLET    Take 81 mg by mouth every other day     ATORVASTATIN (LIPITOR) 40 MG TABLET    Take 1 tablet by mouth every other day    COENZYME Q10 (CO Q 10 PO)    Take 200 mg by mouth    CPAP MACHINE MISC    by Does not apply route Please change CPAP pressure to 10 cm H20. LISINOPRIL (PRINIVIL;ZESTRIL) 10 MG TABLET    Take 1 tablet by mouth daily    LORATADINE (CLARITIN) 10 MG TABLET    Take 1 tablet by mouth daily    METOPROLOL TARTRATE (LOPRESSOR) 25 MG TABLET    TAKE ONE TABLET BY MOUTH TWICE A DAY    METOPROLOL TARTRATE (LOPRESSOR) 25 MG TABLET    TAKE 1 TABLET TWICE A DAY    PRASUGREL (EFFIENT) 10 MG TABS    TAKE ONE TABLET BY MOUTH ONE TIME A DAY    TAMSULOSIN (FLOMAX) 0.4 MG CAPSULE    Take 1 capsule by mouth nightly    THERAPEUTIC MULTIVITAMIN-MINERALS (THERAGRAN-M) TABLET    Take 1 tablet by mouth daily.          ALLERGIES     Codeine and Darvon [propoxyphene hcl]    FAMILY HISTORY       Family History   Problem Relation Age of Onset    Hypertension Father     Stroke Father     Heart Disease Mother     Kidney Disease Mother         SOCIAL HISTORY       Social History     Socioeconomic History    Marital status:      Spouse name: radha    Number of children: 11    Years of education: 15    Highest education level: None   Occupational History    None   Social Needs    Financial resource strain: None    Food insecurity:     Worry: None     Inability: None    Transportation needs:     Medical: None     Non-medical: None   Tobacco Use    Smoking status: Former Smoker     Packs/day: 2.00     Years: 39.00     Pack years: 78.00     Types: Cigarettes     Last attempt to quit: 4/21/2008     Years since quitting: 11.0    Smokeless tobacco: Never Used   Substance and Sexual Activity    Alcohol use: No     Alcohol/week: 0.0 oz    Drug use: No    Sexual activity: Yes   Lifestyle    Physical activity:     Days per week: None     Minutes per session: None    Stress: None   Relationships    Social connections:     Talks on phone: None     Gets together: None     Attends Islam service: None     Active member of club or organization: None     Attends meetings of clubs or organizations: None     Relationship status: None    Intimate partner violence:     Fear of current or ex partner: None     Emotionally abused: None     Physically abused: None     Forced sexual activity: None   Other Topics Concern    None   Social History Narrative    None       REVIEW OF SYSTEMS     Review of Systems   Constitutional: Negative for chills and fever. HENT: Positive for nosebleeds. Except as noted above the remainder of the review of systems was reviewed and is. PHYSICAL EXAM    (up to 7 for level 4, 8 or more for level 5)     ED Triage Vitals   BP Temp Temp Source Pulse Resp SpO2 Height Weight   05/10/19 0723 05/10/19 0912 05/10/19 0912 05/10/19 0723 05/10/19 0723 05/10/19 0723 05/10/19 0723 05/10/19 0723   (!) 145/90 97.7 °F (36.5 °C) Oral 63 16 97 % 6' 2\" (1.88 m) 207 lb (93.9 kg)       Physical Exam   Constitutional: He is oriented to person, place, and time. He appears well-developed and well-nourished. HENT:   Head: Normocephalic. Left naris active hemorrhage noted. Site not identified due to bleeding   Eyes: Pupils are equal, round, and reactive to light. Conjunctivae and EOM are normal.   Neck: Neck supple. No tracheal deviation present. Pulmonary/Chest: Effort normal.   No respiratory distress or tachypnea   Musculoskeletal: Normal range of motion. Neurological: He is alert and oriented to person, place, and time. No cranial nerve deficit. Skin: Skin is warm and dry.    Nursing note and vitals reviewed. DIAGNOSTIC RESULTS     EKG:(none if blank)  All EKG's are [facility] who either signs or Co-signs this chart in the absence of a cardiologist.        RADIOLOGY: (none if blank)   Interpretation per the Radiologistbelow, if available at the time of this note:    No orders to display       LABS:  Labs Reviewed - No data to display    All other labs were within normal range or not returned as of this dictation. Please note, any cultures that may have been sent were not resulted at the time of this patient visit. EMERGENCY DEPARTMENT COURSE andMedical Decision Making:     MDM/  ED Course as of May 10 1029   Fri May 10, 2019   0911 No active hemorrhage. Will continue to monitor    [AB]   0951 No epiastaxis    [AB]   8734 I was able to visualize an area of recent hemorrhage. I used silver nitrate to apply a small brief amount at the area of recent hemorrhage. Pt tolerated this well. No hemorrhage after 3hrs of observation. Pt reaosnable for discharge home. Return instructions discussed and pt agrees    [AB]      ED Course User Index  [AB] [address], the patient was instructed to blow his nose and blow all the clots of blood that are present. Immediately up after this, I was able to aerosolize approximately 2 miles of a mixture of Afrin and TX a into the left naris. The bleeding slowed considerably and over time completely subsided. The patient was monitored for several hours.   I was able to assess for site of bleeding and noted an area as discussed above was treated with silver nitrate    Strict returnprecautions and follow up instructions were discussed with the patient with which the patient agrees    ED Medications administered this visit:    Medications   oxymetazoline (AFRIN) 0.05 % nasal spray 1 spray (1 spray Nasal Given 5/10/19 3507)   tranexamic acid (CYKLOKAPRON) injection 1,000 mg (1,000 mg Topical Given 5/10/19 7084) Procedures: (None if blank)       CLINICAL       1.  Epistaxis          DISPOSITION/PLAN   DISPOSITION Discharge - Pending Orders Complete 05/10/2019 10:23:14 AM      PATIENT REFERRED TO:  Analisa Gallo MD  [facility] [address] [id number] [phone]    In 2 days        DISCHARGE MEDICATIONS:  New Prescriptions    No medications on file              (Please note that portions of this note were completed with a voice recognition program.  Efforts were made to edit the dictations but occasionallywords are mis-transcribed.)      Getachew Gaona DO,FACEP (electronically signed)  Attending Physician, Emergency [address], DO  05/10/19 8823

## 2019-05-13 VITALS — BODY MASS INDEX: 27.44 KG/M2 | WEIGHT: 208 LBS

## 2019-05-16 ENCOUNTER — OFFICE VISIT (OUTPATIENT)
Dept: ENT CLINIC | Age: 64
End: 2019-05-16
Payer: COMMERCIAL

## 2019-05-16 VITALS
BODY MASS INDEX: 27.01 KG/M2 | HEIGHT: 74 IN | SYSTOLIC BLOOD PRESSURE: 120 MMHG | DIASTOLIC BLOOD PRESSURE: 76 MMHG | HEART RATE: 64 BPM | TEMPERATURE: 98 F | RESPIRATION RATE: 12 BRPM | WEIGHT: 210.5 LBS

## 2019-05-16 DIAGNOSIS — R04.0 EPISTAXIS: Primary | ICD-10-CM

## 2019-05-16 DIAGNOSIS — H69.83 ETD (EUSTACHIAN TUBE DYSFUNCTION), BILATERAL: ICD-10-CM

## 2019-05-16 DIAGNOSIS — H91.93 BILATERAL HEARING LOSS, UNSPECIFIED HEARING LOSS TYPE: ICD-10-CM

## 2019-05-16 DIAGNOSIS — H93.11 TINNITUS OF RIGHT EAR: ICD-10-CM

## 2019-05-16 PROCEDURE — 99204 OFFICE O/P NEW MOD 45 MIN: CPT | Performed by: PHYSICIAN ASSISTANT

## 2019-05-16 RX ORDER — FLUTICASONE PROPIONATE 50 MCG
1 SPRAY, SUSPENSION (ML) NASAL DAILY
Qty: 1 BOTTLE | Refills: 1 | Status: SHIPPED | OUTPATIENT
Start: 2019-05-16 | End: 2019-12-28

## 2019-05-16 ASSESSMENT — ENCOUNTER SYMPTOMS
RHINORRHEA: 0
TROUBLE SWALLOWING: 0
WHEEZING: 0
SHORTNESS OF BREATH: 0
SINUS PRESSURE: 0
COUGH: 0
NAUSEA: 0
SORE THROAT: 0
ABDOMINAL PAIN: 0
VOMITING: 0
STRIDOR: 0
DIARRHEA: 0
COLOR CHANGE: 0
FACIAL SWELLING: 0
APNEA: 0
CHEST TIGHTNESS: 0
VOICE CHANGE: 0
CHOKING: 0

## 2019-05-16 NOTE — PATIENT INSTRUCTIONS
Afrin (oxymetazoline) and have it around the house. If you have a nose bleed use three sprays on the bleeding side and hold pressure for 15 minutes. Repeat until bleeding stops. If it does not stop call our office or go to the ER. Use nasal saline gel (AYR is one brand) before bed to prevent nose drying out while using CPAP. Can be used during the day as much as you like as well. There is no medication in there. Use Flonase 1 spray each nostril once a day.   Point this towards the outside corner of your eye (approximately 45 degree angle)

## 2019-05-16 NOTE — PROGRESS NOTES
[address] [place] [address], NOSE AND THROAT  [facility] [address]  [facility] [address] [address]  Dept: [phone]  Dept Fax: [phone]  Loc: [phone]    Susan Rosales is a 75 y.o. male who was referred by No ref. provider found for:  Chief Complaint   Patient presents with    Epistaxis     New patient here for evaluation of his epistaxis. Seen in [facility] ER. Raquel Stahl HPI:     Patient presents today for evaluation of epistaxis. Patient was seen in the ED on 5/10/19 for a nose bleed that would not stop. Patient reports that he had been having multiple nosebleeds a month for the last six months. However, in the last month he has had 6-7 nose bleeds. He denies any since he was seen in the ED. The patient reports that he uses CPAP with humidification, but still wakes up and his nose is dry. He also reports some nasal fullness/congestion. He has a history of severe environmental allergies as a child. He states that he out grew these for the most part, but still occasionally has some itchy eyes and rhinorrhea. He does not take any medication for allergies at this time. He denies a history of sinus infections and sinus pressure. Patient takes Effient daily and aspirin every other day. Has a significant cardiac history, with three bypasses and two stents. The most recent stent placed in 2016. He also reports some tinnitus of the right ear. He states this has been going on for about three months. He used to work in a very loud factory. He wore hearing protection, but states it didn't help a lot. He also reports known hearing loss for years, but the ringing is new. Subjective:        Review of Systems   Constitutional: Negative for activity change, appetite change, chills, diaphoresis, fatigue, fever and unexpected weight change. HENT: Positive for nosebleeds.  Negative for congestion, dental problem, ear discharge, ear pain, facial swelling, hearing loss, mouth sores, postnasal drip, rhinorrhea, sinus pressure, sneezing, sore throat, tinnitus, trouble swallowing and voice change. Eyes: Negative for visual disturbance. Respiratory: Negative for apnea, cough, choking, chest tightness, shortness of breath, wheezing and stridor. Cardiovascular: Negative for chest pain, palpitations and leg swelling. Gastrointestinal: Negative for abdominal pain, diarrhea, nausea and vomiting. Endocrine: Negative for cold intolerance, heat intolerance, polydipsia and polyuria. Genitourinary: Negative for difficulty urinating, discharge, dysuria, enuresis, hematuria, penile pain, penile swelling, scrotal swelling, testicular pain and urgency. Musculoskeletal: Negative for arthralgias, gait problem, neck pain and neck stiffness. Skin: Negative for color change, rash and wound. Allergic/Immunologic: Negative for environmental allergies, food allergies and immunocompromised state. Neurological: Negative for dizziness, seizures, syncope, facial asymmetry, speech difficulty, light-headedness and headaches. Hematological: Negative for adenopathy. Does not bruise/bleed easily. Psychiatric/Behavioral: Negative for confusion, sleep disturbance and suicidal ideas. The patient is not nervous/anxious. Objective: This is a 75 y.o. male. Patient is alert and oriented to person, place and time. Mood is happy. /76 (Site: Right Upper Arm, Position: Sitting)   Pulse 64   Temp 98 °F (36.7 °C) (Oral)   Resp 12   Ht 6' 2\" (1.88 m)   Wt 210 lb 8 oz (95.5 kg)   BMI 27.03 kg/m²     Ears:  External ears are normal: no scars, lesions or masses.    R External auditory canal clear and free of any pathology  L External auditory canal clear and free of any pathology   Tympanic membranes:  R intact, translucent                                                  L intact, translucent    Tuning fork Right Ear:  512 hz - Air conduction greater than bone conduction    Left Ear:  512 hz - Air conduction greater than bone conduction    Martinez: 512 hz.  Result - midline    Nose:   Septum:  normal  Mucosa:  inflamed  Turbinates: red and edematous            Discharge:  clear    Lips, tongue and oral cavity show tongue is midline, mobile, no lesions. Dentition: good, no malocclusion  Oral mucosa: moist  Tonsils: absent  Oropharynx: normal-appearing mucosa  Hard and soft palates symmetrical and intact. Uvula midline. Gag reflex present    Neck: Trachea midline. Thyroid not enlarged, no palpable masses or tenderness  Lymphatic: No cervical lymphadenopathy noted. Eyes: TRIPP, EOM intact. Conjunctiva moist without discharge. Lungs: Normal effort of breathing, not obviously distressed. Assessment/Plan:     Diagnosis Orders   1. Epistaxis     2. Tinnitus of right ear  Audiometry with tympanometry   3. ETD (Eustachian tube dysfunction), bilateral  fluticasone (FLONASE) 50 MCG/ACT nasal spray   4. Bilateral hearing loss, unspecified hearing loss type  Audiometry with tympanometry     Patient is a 75year old male that presents for evaluation of epistaxis. Patient has not had any bleeding since he was seen at the ED. I explained to the patient good nasal hygiene to prevent his nose from drying out and using Afrin to help stop acute bleeds. No cauterization performed as patient hasn't had a bleed in almost a week and no visible spots required cautery. I recommended the patient get an audiogram to evaluate his hearing, given known history of decreased hearing and new onset tinnitus the last few months. Patient will discuss the audiogram with his wife (reportedly not covered by insurance) and determine if he wants to proceed. Patient was also started on Flonase, due to history of allergies. He will call the office to set up the audio as well as follow up.       Electronically signed by COLTON Salazar on 5/16/2019 at 1:16 PM

## 2019-05-21 ENCOUNTER — HOSPITAL ENCOUNTER (OUTPATIENT)
Dept: AUDIOLOGY | Age: 64
Discharge: HOME | End: 2019-05-21
Payer: COMMERCIAL

## 2019-05-21 PROCEDURE — 92557 COMPREHENSIVE HEARING TEST: CPT | Performed by: AUDIOLOGIST

## 2019-05-21 PROCEDURE — 92567 TYMPANOMETRY: CPT | Performed by: AUDIOLOGIST

## 2019-05-21 NOTE — PROGRESS NOTES
ACCOUNT #: [de-identified]      AUDIOLOGICAL EVALUATION      REASON FOR TESTING:  The patient is experiencing decreased hearing in both ears and ringing tinnitus in his right ear. His hearing ability is causing him to have difficulties hearing conversation. He has a previous history of occupational noise exposure. He has also has 3 heart attacks and triple bypass surgery. OTOSCOPY: Normal, bilaterally. AUDIOGRAM      Reliability: Good  Audiometer Used:  GSI-61    PURE TONES     RE    LE     [x]   [x] WNL        [x]   [x] Mild    [x]   [x] Moderate       []   [x] Mod-Severe   [x]   [x] Severe    []   [] Profound    TYPE     RE    LE    [x]   [x] SNHL    []   []  Conductive HL    []   [] Mixed HL      CONFIGURATION    RE    LE    []   [] Essentially Flat     [x]   [x]  Sloping  []   [] Steeply Sloping  []   []  Rising  []   [] Cookie Bite    SPEECH AUDIOMETRY   Right Left Sound Field Aided   PTA 15 dB 17 dB     SRT 10 dB 10 dB     SAT       MASKING       % WRS   QUIET 84% 80%      40 dBSL 40 dBSL     %WRS   NOISE              [facility]            Live Voice  [x]     Recorded  []     List   []     WORD RECOGNITION   RE    LE  []   []  Excellent    [x]   [x]  Good  []   [] Fair  []   [] Poor  []   [] Very Poor    TYMPANOGRAMS  RE    LE  [x]   [x]  Normal compliance    []   []  Reduced mobility  []   [] Hyper mobility  [x]   [x] Normal middle ear pressure  []   [] Negative middle ear pressure  []   [] Positive middle ear pressure  []   [] Flat w/normal ECV  []   [] Flat w/large ECV  []   [] Patent PE tube  []   [] Non-Patent PE tube  []   [] Could Not Test    DISTORTION PRODUCT OTOACOUSTIC EMISSIONS SCREENING    Right Ear     [] Passed     [] Refer     [x] Did Not Test  Left Ear        [] Passed     [] Refer     [x] Did Not Test      COMMENTS: Normal hearing sloping to a severe sensorineural hearing loss, bilaterally. Normal middle er function in each ear.         RECOMMENDATION(S): It is recommended that the

## 2019-05-21 NOTE — LETTER
[address] [facility]  [address]  [address]  [address] [address]  Phone: [phone]    [place], [place]. D        May 21, 2019     Gracie Santoyo  [address] [facility] [address]      Dear Carson Kearns:    Below are the results from your recent visit:        ACCOUNT #: [de-identified]      AUDIOLOGICAL EVALUATION      REASON FOR TESTING:  The patient is experiencing decreased hearing in both ears and ringing tinnitus in his right ear. His hearing ability is causing him to have difficulties hearing conversation. He has a previous history of occupational noise exposure. He has also has 3 heart attacks and triple bypass surgery. OTOSCOPY: Normal, bilaterally.      AUDIOGRAM      Reliability: Good  Audiometer Used:  GSI-61    PURE TONES     RE    LE     [x]   [x] WNL        [x]   [x] Mild    [x]   [x] Moderate       []   [x] Mod-Severe   [x]   [x] Severe    []   [] Profound    TYPE     RE    LE    [x]   [x] SNHL    []   []  Conductive HL    []   [] Mixed HL      CONFIGURATION    RE    LE    []   [] Essentially Flat     [x]   [x]  Sloping  []   [] Steeply Sloping  []   []  Rising  []   [] Cookie Bite    SPEECH AUDIOMETRY   Right Left Sound Field Aided   PTA 15 dB 17 dB     SRT 10 dB 10 dB     SAT       MASKING       % WRS   QUIET 84% 80%      40 dBSL 40 dBSL     %WRS   NOISE              MCL       UCL            Live Voice  [x]     Recorded  []     List   []     WORD RECOGNITION   RE    LE  []   []  Excellent    [x]   [x]  Good  []   [] Fair  []   [] Poor  []   [] Very Poor    TYMPANOGRAMS  RE    LE  [x]   [x]  Normal compliance    []   []  Reduced mobility  []   [] Hyper mobility  [x]   [x] Normal middle ear pressure  []   [] Negative middle ear pressure  []   [] Positive middle ear pressure  []   [] Flat w/normal ECV  []   [] Flat w/large ECV  []   [] Patent PE tube  []   [] Non-Patent PE tube  []   [] Could Not Test    DISTORTION PRODUCT OTOACOUSTIC EMISSIONS SCREENING Right Ear     [] Passed     [] Refer     [x] Did Not Test  Left Ear        [] Passed     [] Refer     [x] Did Not Test      COMMENTS: Normal hearing sloping to a severe sensorineural hearing loss, bilaterally. Normal middle er function in each ear. RECOMMENDATION(S): It is recommended that the patient pursue bilateral haring aids. Annual audiometric monitoring is recommended. The patient was encouraged to schedule a hearing aid evaluation when he is ready to proceed. If you have any questions or concerns, please don't hesitate to call. Sincerely,          ARACELI Whitley

## 2019-05-22 ENCOUNTER — OFFICE VISIT (OUTPATIENT)
Dept: PULMONOLOGY | Age: 64
End: 2019-05-22
Payer: COMMERCIAL

## 2019-05-22 VITALS
SYSTOLIC BLOOD PRESSURE: 112 MMHG | WEIGHT: 213 LBS | DIASTOLIC BLOOD PRESSURE: 64 MMHG | HEART RATE: 61 BPM | BODY MASS INDEX: 27.34 KG/M2 | OXYGEN SATURATION: 97 % | HEIGHT: 74 IN

## 2019-05-22 DIAGNOSIS — G47.33 OBSTRUCTIVE SLEEP APNEA ON CPAP: Primary | ICD-10-CM

## 2019-05-22 DIAGNOSIS — I25.119 CORONARY ARTERY DISEASE INVOLVING NATIVE CORONARY ARTERY OF NATIVE HEART WITH ANGINA PECTORIS (HCC): ICD-10-CM

## 2019-05-22 DIAGNOSIS — I10 ESSENTIAL HYPERTENSION: ICD-10-CM

## 2019-05-22 DIAGNOSIS — Z99.89 OBSTRUCTIVE SLEEP APNEA ON CPAP: Primary | ICD-10-CM

## 2019-05-22 PROCEDURE — 99213 OFFICE O/P EST LOW 20 MIN: CPT | Performed by: PHYSICIAN ASSISTANT

## 2019-05-22 ASSESSMENT — ENCOUNTER SYMPTOMS
NAUSEA: 0
BACK PAIN: 0
WHEEZING: 0
DIARRHEA: 0
SHORTNESS OF BREATH: 0
STRIDOR: 0
COUGH: 0
CHEST TIGHTNESS: 0
ALLERGIC/IMMUNOLOGIC NEGATIVE: 1
EYES NEGATIVE: 1

## 2019-05-22 NOTE — PROGRESS NOTES
[facility]      Paulina Vega         [id number]  5/22/2019   Chief Complaint   Patient presents with    1 Year Follow Up     REANNA with a Download         Pt of Dr. Negra Retana    PAP Download:   Original or initialAHI:4.8     Date of initial study: 4-29-11      Compliant  100%     Noncompliant       PAP Type CPAP  Level  11.0 cmH20   Avg Hrs/Day 7:5  AHI: 1.9   Recorded compliance dates ,4-22-19 to 5-21-19   Machine/Mfg: Respironics Interface: FFM    Provider:    _x_SR-HME           [employer]        __ [employer]    __ Cathren Barters            __[facility]   __[place]:       __Other    Neck Size: 15  Mallampati 3  ESS: 3    SAQLI: 88    Here is a scan of the most recent download:              Presentation:   Olga Beltran presents for sleep medicine follow up for obstructive sleep apnea  Since the last visit, Olga Beltran is doing well with PAP. He feels rested. His sleep limited some by taking care of his MRDD daughter. Equipment issues: The pressure is  acceptable, the mask is acceptable and he  is  using the humidity. Sleep issues:  Do you feel better? Yes  More rested? Yes   Better concentration? yes    Progress History:   Since last visit any new medical issues? No  New ER or hospitlal visits? No  Any new or changes in medicines? No  Any new sleep medicines? No        Past Medical History:   Diagnosis Date    Bleeding tendency ([address]     Patient states \"From medications\"    CAD (coronary artery disease)     Chest pain     Fatigue     HLD (hyperlipidemia)     Hypertension     MI (myocardial infarction) ([address]     Snores     SOB (shortness of breath)        Past Surgical History:   Procedure Laterality Date    CARDIAC CATHETERIZATION  4-21-08    Significant vasculopathy w/ severe disease in the coronary arteries, pretty much aneurysmal and diffuse in nature everywhere, involving pretty much every segment of coronary arteries.  Critical stenosis of the left circ. which is likely to be culprit vessel. Significant aortic disease as well. Mild LV dysfunction.  CARDIOVASCULAR STRESS TEST  11    Moderate inferolateral infarct w/o obvious significant stress induced ischemia. EF 58%. Mild wall motion abnormality.  CORONARY ANGIOPLASTY WITH STENT PLACEMENT  13    CORONARY ANGIOPLASTY WITH STENT PLACEMENT  10/30/2016    3 stent by Dr Cassidy Berg  [id number]   [address]      TRANSTHORACIC ECHOCARDIOGRAM  3-31-11    LV size normal. Systolic function mildly reduced. EF 50-55%. Mild diffuse hypokinesis. Wall thickness mildly increased. Mild concentric LVH.  TRANSTHORACIC ECHOCARDIOGRAM  08    Global LV dysfunction. EF 40%. Calcified AV w/o obvious stenosis. Mild MR and TR. Dilated ascending aorta.        Social History     Tobacco Use    Smoking status: Former Smoker     Packs/day: 2.00     Years: 39.00     Pack years: 78.00     Types: Cigarettes     Last attempt to quit: 2008     Years since quittin.0    Smokeless tobacco: Never Used   Substance Use Topics    Alcohol use: No     Alcohol/week: 0.0 oz    Drug use: No       Allergies   Allergen Reactions    Codeine     Darvon [Propoxyphene Hcl]      Not sure of reaction       Current Outpatient Medications   Medication Sig Dispense Refill    fluticasone (FLONASE) 50 MCG/ACT nasal spray 1 spray by Each Nare route daily 1 Spray in each nostril 1 Bottle 1    metoprolol tartrate (LOPRESSOR) 25 MG tablet TAKE ONE TABLET BY MOUTH TWICE A  tablet 3    lisinopril (PRINIVIL;ZESTRIL) 10 MG tablet Take 1 tablet by mouth daily 90 tablet 3    prasugrel (EFFIENT) 10 MG TABS TAKE ONE TABLET BY MOUTH ONE TIME A DAY 90 tablet 3    atorvastatin (LIPITOR) 40 MG tablet Take 1 tablet by mouth every other day 45 tablet 3    tamsulosin (FLOMAX) 0.4 MG capsule Take 1 capsule by mouth nightly 90 capsule 3    Coenzyme Q10 (CO Q 10 PO) Take 200 mg by mouth      CPAP Machine MISC by Does not apply route Please change CPAP pressure to 10 cm H20. 1 each 0    therapeutic multivitamin-minerals (THERAGRAN-M) tablet Take 1 tablet by mouth daily.  aspirin 81 MG EC tablet Take 81 mg by mouth every other day        No current facility-administered medications for this visit. Family History   Problem Relation Age of Onset    Hypertension Father     Stroke Father     Heart Disease Mother     Kidney Disease Mother         Review of Systems -   Review of Systems   Constitutional: Negative for activity change, appetite change, chills and fever. HENT: Negative for congestion and postnasal drip. Eyes: Negative. Respiratory: Negative for cough, chest tightness, shortness of breath, wheezing and stridor. Cardiovascular: Negative for chest pain and leg swelling. Gastrointestinal: Negative for diarrhea and nausea. Endocrine: Negative. Genitourinary: Negative. Musculoskeletal: Negative. Negative for arthralgias and back pain. Skin: Negative. Allergic/Immunologic: Negative. Neurological: Negative. Negative for dizziness and light-headedness. Psychiatric/Behavioral: Negative. All other systems reviewed and are negative. Physical Exam:    BMI:  Body mass index is 27.35 kg/m². Wt Readings from Last 3 Encounters:   05/22/19 213 lb (96.6 kg)   05/16/19 210 lb 8 oz (95.5 kg)   05/10/19 207 lb (93.9 kg)     Weight gained 6 lbs over month  Vitals: /64 (Site: Left Upper Arm, Position: Sitting, Cuff Size: Medium Adult)   Pulse 61   Ht 6' 2\" (1.88 m)   Wt 213 lb (96.6 kg)   SpO2 97% Comment: on RA  BMI 27.35 kg/m²       Physical Exam   Constitutional: He is oriented to person, place, and time. He appears well-developed and well-nourished. HENT:   Head: Normocephalic and atraumatic.    Right Ear: External ear normal.   Left Ear: External ear normal.   Mouth/Throat: Oropharynx is clear and moist.   Eyes: Pupils are equal, round, and reactive to light. Conjunctivae and EOM are normal.   Neck: Normal range of motion. Neck supple. Cardiovascular: Normal rate, regular rhythm and normal heart sounds. Pulmonary/Chest: Effort normal and breath sounds normal.   Abdominal: Soft. Musculoskeletal: Normal range of motion. Neurological: He is alert and oriented to person, place, and time. Skin: Skin is warm and dry. Psychiatric: He has a normal mood and affect. His behavior is normal. Judgment and thought content normal.         ASSESSMENT/DIAGNOSIS     Diagnosis Orders   1. Obstructive sleep apnea on CPAP     2. Coronary artery disease involving native coronary artery of native heart with angina pectoris ([facility]     3. Essential hypertension              Plan   Do you need any equipment today? Yes update supplies  - Discussed the importance of continuing good compliance given HTN and CAD  - He  was advised to continue current positive airway pressure therapy with above described pressure. - He  advised to keep goodcompliance with current recommended pressure to get optimal results and clinical improvement  - Recommend 7-9 hours of sleep with PAP  - He was advised to call Personally company regarding supplies if needed.   -He call my office for earlier appointment if needed for worsening of sleep symptoms.   - He was instructed on weight loss  - Michelle Reddy was educated about my impression and plan. Patient verbalizesunderstanding.   We will see Felipe Donnelly back in: 1 year with download    Information added by my medical assistant/LPN was reviewed today       Sakshi Marcus PA-C, MPAS  5/22/2019

## 2019-05-24 ENCOUNTER — OFFICE VISIT (OUTPATIENT)
Dept: FAMILY MEDICINE CLINIC | Age: 64
End: 2019-05-24

## 2019-05-24 VITALS
HEART RATE: 62 BPM | RESPIRATION RATE: 16 BRPM | SYSTOLIC BLOOD PRESSURE: 122 MMHG | BODY MASS INDEX: 26.84 KG/M2 | WEIGHT: 209.13 LBS | DIASTOLIC BLOOD PRESSURE: 72 MMHG | HEIGHT: 74 IN

## 2019-05-24 DIAGNOSIS — N40.1 BENIGN PROSTATIC HYPERPLASIA WITH WEAK URINARY STREAM: ICD-10-CM

## 2019-05-24 DIAGNOSIS — R39.12 BENIGN PROSTATIC HYPERPLASIA WITH WEAK URINARY STREAM: ICD-10-CM

## 2019-05-24 DIAGNOSIS — Z12.5 SCREENING FOR MALIGNANT NEOPLASM OF PROSTATE: Primary | ICD-10-CM

## 2019-05-24 DIAGNOSIS — Z12.11 SCREEN FOR COLON CANCER: ICD-10-CM

## 2019-05-24 LAB
HEMOCCULT STL QL: NORMAL

## 2019-05-24 PROCEDURE — 99214 OFFICE O/P EST MOD 30 MIN: CPT | Performed by: FAMILY MEDICINE

## 2019-05-24 PROCEDURE — 82270 OCCULT BLOOD FECES: CPT | Performed by: FAMILY MEDICINE

## 2019-05-24 RX ORDER — TAMSULOSIN HYDROCHLORIDE 0.4 MG/1
0.4 CAPSULE ORAL NIGHTLY
Qty: 90 CAPSULE | Refills: 3 | Status: SHIPPED | OUTPATIENT
Start: 2019-05-24 | End: 2020-05-21 | Stop reason: SDUPTHER

## 2019-05-24 ASSESSMENT — PATIENT HEALTH QUESTIONNAIRE - PHQ9
SUM OF ALL RESPONSES TO PHQ9 QUESTIONS 1 & 2: 0
2. FEELING DOWN, DEPRESSED OR HOPELESS: 0
SUM OF ALL RESPONSES TO PHQ QUESTIONS 1-9: 0
1. LITTLE INTEREST OR PLEASURE IN DOING THINGS: 0
SUM OF ALL RESPONSES TO PHQ QUESTIONS 1-9: 0

## 2019-05-24 ASSESSMENT — ENCOUNTER SYMPTOMS
SHORTNESS OF BREATH: 0
SINUS PRESSURE: 0
CONSTIPATION: 0

## 2019-05-24 NOTE — PROGRESS NOTES
No components found for: CHLPL  Lab Results   Component Value Date    TRIG 102 05/06/2019     Lab Results   Component Value Date    HDL 48 05/06/2019     Lab Results   Component Value Date    LDLCALC 103 05/06/2019     No results found for: LABVLDL    Lab Results   Component Value Date    ALT 23 10/29/2016    AST 26 10/29/2016    ALKPHOS 94 10/29/2016    BILITOT 0.5 10/29/2016           Is patient currently taking any cholesterol medications? Yes   If yes, see med list as above    Is the patient reporting any side effects of cholesterol medications? No    Is the patient taking any over the counter medications? Yes   If yes, see med list as above    Is the patient taking a daily aspirin? Yes      Patient Self-Management Goal for Chronic Condition  Goal: I will take all medications as prescribed by my doctor, and I will call the office if I am having any medication problems. Barriers to success: none  Plan for overcoming my barriers: N/A     Confidence: 10/10  Date goal set: 5/24/19  Date goal attained:     Have you seen any other physician or provider since your last visit no    Have you had any other diagnostic tests since your last visit? no    Have you changed or stopped any medications since your last visit including any over-the-counter medicines, vitamins, or herbal medicines? no     Are you taking all your prescribed medications?  Yes    If NO, why?

## 2019-05-24 NOTE — PROGRESS NOTES
Subjective:      Patient ID: Grupo Cabello is a 75 y.o. male. HPI  1. He states he is doing well  Review of Systems   Constitutional: Negative for fatigue. HENT: Negative for sinus pressure. Eyes: Negative for visual disturbance. Respiratory: Negative for shortness of breath. Cardiovascular: Negative for chest pain. Gastrointestinal: Negative for constipation. Genitourinary: Negative. Musculoskeletal: Negative for arthralgias. Skin: Negative for rash. Neurological: Negative for headaches. The patient's medications, allergies, past medical problems, surgical, social, and family histories were reviewed and updated as needed. Objective:   Physical Exam   Constitutional: He is oriented to person, place, and time. He appears well-developed and well-nourished. No distress. HENT:   Head: Normocephalic and atraumatic. Right Ear: External ear normal.   Left Ear: External ear normal.   Nose: Nose normal.   Mouth/Throat: Oropharynx is clear and moist. No oropharyngeal exudate. Eyes: Conjunctivae are normal. No scleral icterus. Neck: Neck supple. Carotid bruit is not present. No tracheal deviation present. No thyromegaly present. Cardiovascular: Normal rate, regular rhythm, normal heart sounds and intact distal pulses. Pulmonary/Chest: Effort normal and breath sounds normal.   Abdominal: Soft. Bowel sounds are normal. He exhibits no mass. Musculoskeletal: He exhibits no edema. Lymphadenopathy:     He has no cervical adenopathy. Neurological: He is alert and oriented to person, place, and time. Skin: Skin is warm and dry. Psychiatric: He has a normal mood and affect. His behavior is normal.   Blood pressure 122/72, pulse 62, resp. rate 16, height 6' 2\" (1.88 m), weight 209 lb 2 oz (94.9 kg).     Results for orders placed or performed in visit on 05/24/19   POCT occult blood stool   Result Value Ref Range    OCCULT BLOOD FECAL      OCCULT BLOOD FECAL      OCCULT BLOOD FECAL

## 2019-05-24 NOTE — PATIENT INSTRUCTIONS
Consider getting the pneumonia shot and the shingles shot at the pharmacy  Let me know if we can arrange the low dose screening CT of the chest for lung cancer  Do the non fasting PSA test soon  See me in a year

## 2019-06-04 DIAGNOSIS — I25.119 CORONARY ARTERY DISEASE WITH ANGINA PECTORIS, UNSPECIFIED VESSEL OR LESION TYPE, UNSPECIFIED WHETHER NATIVE OR TRANSPLANTED HEART (HCC): ICD-10-CM

## 2019-06-04 DIAGNOSIS — I10 ESSENTIAL HYPERTENSION: ICD-10-CM

## 2019-06-04 RX ORDER — LISINOPRIL 10 MG/1
TABLET ORAL
Qty: 90 TABLET | Refills: 3 | Status: SHIPPED | OUTPATIENT
Start: 2019-06-04 | End: 2019-10-28 | Stop reason: SDUPTHER

## 2019-06-04 RX ORDER — PRASUGREL 10 MG/1
TABLET, FILM COATED ORAL
Qty: 90 TABLET | Refills: 3 | OUTPATIENT
Start: 2019-06-04

## 2019-06-04 RX ORDER — ATORVASTATIN CALCIUM 40 MG/1
40 TABLET, FILM COATED ORAL EVERY OTHER DAY
Qty: 45 TABLET | Refills: 3 | Status: SHIPPED | OUTPATIENT
Start: 2019-06-04 | End: 2019-10-28 | Stop reason: SDUPTHER

## 2019-06-04 NOTE — TELEPHONE ENCOUNTER
Date of last visit:  5/24/2019  Date of next visit:  Visit date not found    Requested Prescriptions     Pending Prescriptions Disp Refills    atorvastatin (LIPITOR) 40 MG tablet [Pharmacy Med Name: ATORVASTATIN CALCIUM 40MG TABS] 45 tablet 3     Sig: TAKE 1 TABLET BY MOUTH EVERY OTHER DAY    prasugrel (EFFIENT) 10 MG TABS [Pharmacy Med Name: PRASUGREL HCL 10MG TABS] 90 tablet 3     Sig: TAKE ONE TABLET BY MOUTH ONE TIME A DAY    lisinopril (PRINIVIL;ZESTRIL) 10 MG tablet [Pharmacy Med Name: LISINOPRIL 10MG TABS] 90 tablet 3     Sig: TAKE ONE TABLET BY MOUTH ONE TIME A DAY

## 2019-06-12 DIAGNOSIS — I25.119 CORONARY ARTERY DISEASE WITH ANGINA PECTORIS, UNSPECIFIED VESSEL OR LESION TYPE, UNSPECIFIED WHETHER NATIVE OR TRANSPLANTED HEART (HCC): ICD-10-CM

## 2019-06-12 RX ORDER — PRASUGREL 10 MG/1
TABLET, FILM COATED ORAL
Qty: 90 TABLET | Refills: 3 | Status: SHIPPED | OUTPATIENT
Start: 2019-06-12 | End: 2020-04-29 | Stop reason: SDUPTHER

## 2019-06-12 NOTE — TELEPHONE ENCOUNTER
Graciela Diop called requesting a refill on the following medications:  Requested Prescriptions     Pending Prescriptions Disp Refills    prasugrel (EFFIENT) 10 MG TABS 90 tablet 3     Sig: TAKE ONE TABLET BY MOUTH ONE TIME A DAY     Pharmacy verified:  [facility] mail order      Date of last visit: 10-22-18  Date of next visit (if applicable): 92/28/1905

## 2019-09-09 DIAGNOSIS — I10 ESSENTIAL HYPERTENSION: ICD-10-CM

## 2019-09-09 DIAGNOSIS — I25.119 CORONARY ARTERY DISEASE WITH ANGINA PECTORIS, UNSPECIFIED VESSEL OR LESION TYPE, UNSPECIFIED WHETHER NATIVE OR TRANSPLANTED HEART (HCC): ICD-10-CM

## 2019-09-16 ENCOUNTER — HOSPITAL ENCOUNTER (OUTPATIENT)
Age: 64
Discharge: HOME | End: 2019-09-16
Payer: COMMERCIAL

## 2019-09-16 ENCOUNTER — OFFICE VISIT (OUTPATIENT)
Dept: FAMILY MEDICINE CLINIC | Age: 64
End: 2019-09-16

## 2019-09-16 ENCOUNTER — HOSPITAL ENCOUNTER (OUTPATIENT)
Dept: GENERAL RADIOLOGY | Age: 64
Discharge: HOME | End: 2019-09-16
Payer: COMMERCIAL

## 2019-09-16 ENCOUNTER — TELEPHONE (OUTPATIENT)
Dept: FAMILY MEDICINE CLINIC | Age: 64
End: 2019-09-16

## 2019-09-16 VITALS
WEIGHT: 215.25 LBS | RESPIRATION RATE: 16 BRPM | OXYGEN SATURATION: 97 % | BODY MASS INDEX: 27.63 KG/M2 | DIASTOLIC BLOOD PRESSURE: 76 MMHG | SYSTOLIC BLOOD PRESSURE: 142 MMHG | HEIGHT: 74 IN | HEART RATE: 70 BPM

## 2019-09-16 DIAGNOSIS — R06.09 DOE (DYSPNEA ON EXERTION): ICD-10-CM

## 2019-09-16 DIAGNOSIS — I10 ESSENTIAL HYPERTENSION: ICD-10-CM

## 2019-09-16 DIAGNOSIS — I25.119 CORONARY ARTERY DISEASE INVOLVING NATIVE CORONARY ARTERY OF NATIVE HEART WITH ANGINA PECTORIS (HCC): ICD-10-CM

## 2019-09-16 DIAGNOSIS — I10 ESSENTIAL HYPERTENSION: Primary | ICD-10-CM

## 2019-09-16 LAB
BASOPHILS # BLD: 0.5 %
BASOPHILS ABSOLUTE: 0 THOU/MM3 (ref 0–0.1)
EOSINOPHIL # BLD: 1.7 %
EOSINOPHILS ABSOLUTE: 0.1 THOU/MM3 (ref 0–0.4)
ERYTHROCYTE [DISTWIDTH] IN BLOOD BY AUTOMATED COUNT: 13.1 % (ref 11.5–14.5)
ERYTHROCYTE [DISTWIDTH] IN BLOOD BY AUTOMATED COUNT: 44.1 FL (ref 35–45)
HCT VFR BLD CALC: 47.4 % (ref 42–52)
HEMOGLOBIN: 15.7 GM/DL (ref 14–18)
IMMATURE GRANS (ABS): 0.02 THOU/MM3 (ref 0–0.07)
IMMATURE GRANULOCYTES: 0 %
LYMPHOCYTES # BLD: 26.1 %
LYMPHOCYTES ABSOLUTE: 1.7 THOU/MM3 (ref 1–4.8)
MCH RBC QN AUTO: 30.4 PG (ref 26–33)
MCHC RBC AUTO-ENTMCNC: 33.1 GM/DL (ref 32.2–35.5)
MCV RBC AUTO: 91.7 FL (ref 80–94)
MONOCYTES # BLD: 18.1 %
MONOCYTES ABSOLUTE: 1.2 THOU/MM3 (ref 0.4–1.3)
NUCLEATED RED BLOOD CELLS: 0 /100 WBC
PLATELET # BLD: 176 THOU/MM3 (ref 130–400)
PMV BLD AUTO: 10.8 FL (ref 9.4–12.4)
PRO-BNP: 98.8 PG/ML (ref 0–900)
RBC # BLD: 5.17 MILL/MM3 (ref 4.7–6.1)
SEG NEUTROPHILS: 53.3 %
SEGMENTED NEUTROPHILS ABSOLUTE COUNT: 3.5 THOU/MM3 (ref 1.8–7.7)
WBC # BLD: 6.5 THOU/MM3 (ref 4.8–10.8)

## 2019-09-16 PROCEDURE — 99213 OFFICE O/P EST LOW 20 MIN: CPT | Performed by: FAMILY MEDICINE

## 2019-09-16 PROCEDURE — 85025 COMPLETE CBC W/AUTO DIFF WBC: CPT

## 2019-09-16 PROCEDURE — 36415 COLL VENOUS BLD VENIPUNCTURE: CPT

## 2019-09-16 PROCEDURE — 83880 ASSAY OF NATRIURETIC PEPTIDE: CPT

## 2019-09-16 PROCEDURE — 71046 X-RAY EXAM CHEST 2 VIEWS: CPT

## 2019-09-16 RX ORDER — PREDNISONE 20 MG/1
40 TABLET ORAL DAILY
Qty: 14 TABLET | Refills: 0 | Status: SHIPPED | OUTPATIENT
Start: 2019-09-16 | End: 2019-09-23

## 2019-09-16 ASSESSMENT — ENCOUNTER SYMPTOMS
CONSTIPATION: 0
SINUS PRESSURE: 0
SHORTNESS OF BREATH: 1

## 2019-09-16 NOTE — PROGRESS NOTES
Subjective:      Patient ID: Jeri Mary is a 75 y.o. male. HPI  1. There has been some SOB the past few days. It was worse yesterday. 2. He had been doing some demolition and painting and it seems worse following that. 3. He has JAMISON on a flight of stairs. It has been more difficult singing  4. He has environmental allergies but it is more congestion normally  5. No cough out of the normally, no new leg swelling  6. He doesn't think it's similar to the cardiac CP  Review of Systems   Constitutional: Negative for fatigue. HENT: Negative for sinus pressure. Eyes: Negative for visual disturbance. Respiratory: Positive for shortness of breath. Cardiovascular: Positive for chest pain. Gastrointestinal: Negative for constipation. Genitourinary: Negative. Musculoskeletal: Negative for arthralgias. Skin: Negative for rash. Neurological: Negative for headaches. The patient's medications, allergies, past medical problems, surgical, social, and family histories were reviewed and updated as needed. Objective:   Physical Exam   Constitutional: He is oriented to person, place, and time. He appears well-developed and well-nourished. No distress. HENT:   Head: Normocephalic and atraumatic. Eyes: Conjunctivae are normal. No scleral icterus. Neck: No tracheal deviation present. Cardiovascular: Normal rate, regular rhythm and normal heart sounds. Pulmonary/Chest: Effort normal and breath sounds normal.   Musculoskeletal: He exhibits no edema. Neurological: He is alert and oriented to person, place, and time. Skin: Skin is warm and dry. Psychiatric: He has a normal mood and affect. His behavior is normal.   Blood pressure (!) 142/76, pulse 70, resp. rate 16, height 6' 2\" (1.88 m), weight 215 lb 4 oz (97.6 kg), SpO2 97 %. Assessment:       Diagnosis Orders   1. Essential hypertension  Brain Natriuretic Peptide   2.  Coronary artery disease involving native coronary artery of native

## 2019-09-17 ENCOUNTER — TELEPHONE (OUTPATIENT)
Dept: FAMILY MEDICINE CLINIC | Age: 64
End: 2019-09-17

## 2019-10-28 ENCOUNTER — OFFICE VISIT (OUTPATIENT)
Dept: CARDIOLOGY CLINIC | Age: 64
End: 2019-10-28
Payer: COMMERCIAL

## 2019-10-28 VITALS
WEIGHT: 213 LBS | BODY MASS INDEX: 27.34 KG/M2 | HEIGHT: 74 IN | DIASTOLIC BLOOD PRESSURE: 92 MMHG | SYSTOLIC BLOOD PRESSURE: 170 MMHG | HEART RATE: 66 BPM

## 2019-10-28 DIAGNOSIS — R06.02 SOB (SHORTNESS OF BREATH): ICD-10-CM

## 2019-10-28 DIAGNOSIS — E78.01 FAMILIAL HYPERCHOLESTEROLEMIA: ICD-10-CM

## 2019-10-28 DIAGNOSIS — I25.119 CORONARY ARTERY DISEASE WITH ANGINA PECTORIS, UNSPECIFIED VESSEL OR LESION TYPE, UNSPECIFIED WHETHER NATIVE OR TRANSPLANTED HEART (HCC): Primary | ICD-10-CM

## 2019-10-28 DIAGNOSIS — I10 ESSENTIAL HYPERTENSION: ICD-10-CM

## 2019-10-28 DIAGNOSIS — R07.9 CHEST PAIN, UNSPECIFIED TYPE: ICD-10-CM

## 2019-10-28 PROCEDURE — 99214 OFFICE O/P EST MOD 30 MIN: CPT | Performed by: NUCLEAR MEDICINE

## 2019-10-28 PROCEDURE — 93000 ELECTROCARDIOGRAM COMPLETE: CPT | Performed by: NUCLEAR MEDICINE

## 2019-10-28 RX ORDER — LISINOPRIL 10 MG/1
10 TABLET ORAL 2 TIMES DAILY
Qty: 180 TABLET | Refills: 3 | Status: SHIPPED | OUTPATIENT
Start: 2019-10-28 | End: 2020-04-29 | Stop reason: SDUPTHER

## 2019-11-07 ENCOUNTER — HOSPITAL ENCOUNTER (OUTPATIENT)
Dept: NON INVASIVE DIAGNOSTICS | Age: 64
Discharge: HOME | End: 2019-11-07
Payer: COMMERCIAL

## 2019-11-07 VITALS — WEIGHT: 207 LBS | BODY MASS INDEX: 26.56 KG/M2 | HEIGHT: 74 IN

## 2019-11-07 DIAGNOSIS — I25.119 CORONARY ARTERY DISEASE WITH ANGINA PECTORIS, UNSPECIFIED VESSEL OR LESION TYPE, UNSPECIFIED WHETHER NATIVE OR TRANSPLANTED HEART (HCC): ICD-10-CM

## 2019-11-07 DIAGNOSIS — I10 ESSENTIAL HYPERTENSION: ICD-10-CM

## 2019-11-07 DIAGNOSIS — E78.01 FAMILIAL HYPERCHOLESTEROLEMIA: ICD-10-CM

## 2019-11-07 DIAGNOSIS — R07.9 CHEST PAIN, UNSPECIFIED TYPE: ICD-10-CM

## 2019-11-07 DIAGNOSIS — R06.02 SOB (SHORTNESS OF BREATH): ICD-10-CM

## 2019-11-07 LAB
LV EF: 55 %
LVEF MODALITY: NORMAL

## 2019-11-07 PROCEDURE — 93306 TTE W/DOPPLER COMPLETE: CPT

## 2019-11-07 PROCEDURE — 78452 HT MUSCLE IMAGE SPECT MULT: CPT

## 2019-11-07 PROCEDURE — 3430000000 HC RX DIAGNOSTIC RADIOPHARMACEUTICAL: Performed by: NUCLEAR MEDICINE

## 2019-11-07 PROCEDURE — A9500 TC99M SESTAMIBI: HCPCS | Performed by: NUCLEAR MEDICINE

## 2019-11-07 PROCEDURE — 93017 CV STRESS TEST TRACING ONLY: CPT

## 2019-11-07 PROCEDURE — 6360000002 HC RX W HCPCS

## 2019-11-07 PROCEDURE — 2709999900 HC NON-CHARGEABLE SUPPLY

## 2019-11-07 RX ADMIN — Medication 10.4 MILLICURIE: at 11:00

## 2019-11-07 RX ADMIN — Medication 33.7 MILLICURIE: at 12:07

## 2019-11-10 ENCOUNTER — HOSPITAL ENCOUNTER (EMERGENCY)
Age: 64
Discharge: HOME | End: 2019-11-10
Attending: FAMILY MEDICINE
Payer: COMMERCIAL

## 2019-11-10 VITALS
DIASTOLIC BLOOD PRESSURE: 87 MMHG | BODY MASS INDEX: 26.58 KG/M2 | WEIGHT: 207 LBS | SYSTOLIC BLOOD PRESSURE: 147 MMHG | RESPIRATION RATE: 16 BRPM | TEMPERATURE: 98 F | HEART RATE: 62 BPM | OXYGEN SATURATION: 97 %

## 2019-11-10 DIAGNOSIS — R04.0 EPISTAXIS: Primary | ICD-10-CM

## 2019-11-10 LAB
APTT: 33.2 SECONDS (ref 22–38)
BASOPHILS # BLD: 0.5 %
BASOPHILS ABSOLUTE: 0 THOU/MM3 (ref 0–0.1)
EOSINOPHIL # BLD: 1.6 %
EOSINOPHILS ABSOLUTE: 0.1 THOU/MM3 (ref 0–0.4)
ERYTHROCYTE [DISTWIDTH] IN BLOOD BY AUTOMATED COUNT: 12.8 % (ref 11.5–14.5)
ERYTHROCYTE [DISTWIDTH] IN BLOOD BY AUTOMATED COUNT: 43 FL (ref 35–45)
HCT VFR BLD CALC: 47.9 % (ref 42–52)
HEMOGLOBIN: 16.2 GM/DL (ref 14–18)
IMMATURE GRANS (ABS): 0.01 THOU/MM3 (ref 0–0.07)
IMMATURE GRANULOCYTES: 0.2 %
INR BLD: 1.04 (ref 0.85–1.13)
LYMPHOCYTES # BLD: 25.7 %
LYMPHOCYTES ABSOLUTE: 1.6 THOU/MM3 (ref 1–4.8)
MCH RBC QN AUTO: 31 PG (ref 26–33)
MCHC RBC AUTO-ENTMCNC: 33.8 GM/DL (ref 32.2–35.5)
MCV RBC AUTO: 91.8 FL (ref 80–94)
MONOCYTES # BLD: 11.4 %
MONOCYTES ABSOLUTE: 0.7 THOU/MM3 (ref 0.4–1.3)
NUCLEATED RED BLOOD CELLS: 0 /100 WBC
PLATELET # BLD: 177 THOU/MM3 (ref 130–400)
PMV BLD AUTO: 9.9 FL (ref 9.4–12.4)
RBC # BLD: 5.22 MILL/MM3 (ref 4.7–6.1)
SEG NEUTROPHILS: 60.6 %
SEGMENTED NEUTROPHILS ABSOLUTE COUNT: 3.8 THOU/MM3 (ref 1.8–7.7)
WBC # BLD: 6.3 THOU/MM3 (ref 4.8–10.8)

## 2019-11-10 PROCEDURE — 6370000000 HC RX 637 (ALT 250 FOR IP)

## 2019-11-10 PROCEDURE — 2709999900 HC NON-CHARGEABLE SUPPLY

## 2019-11-10 PROCEDURE — 85025 COMPLETE CBC W/AUTO DIFF WBC: CPT

## 2019-11-10 PROCEDURE — 99283 EMERGENCY DEPT VISIT LOW MDM: CPT

## 2019-11-10 PROCEDURE — 36415 COLL VENOUS BLD VENIPUNCTURE: CPT

## 2019-11-10 PROCEDURE — 6370000000 HC RX 637 (ALT 250 FOR IP): Performed by: FAMILY MEDICINE

## 2019-11-10 PROCEDURE — 30901 CONTROL OF NOSEBLEED: CPT

## 2019-11-10 PROCEDURE — 85730 THROMBOPLASTIN TIME PARTIAL: CPT

## 2019-11-10 PROCEDURE — 2500000003 HC RX 250 WO HCPCS: Performed by: FAMILY MEDICINE

## 2019-11-10 PROCEDURE — 85610 PROTHROMBIN TIME: CPT

## 2019-11-10 RX ORDER — LIDOCAINE HYDROCHLORIDE 40 MG/ML
4 INJECTION, SOLUTION RETROBULBAR; TOPICAL ONCE
Status: COMPLETED | OUTPATIENT
Start: 2019-11-10 | End: 2019-11-10

## 2019-11-10 RX ORDER — LIDOCAINE HYDROCHLORIDE 10 MG/ML
INJECTION, SOLUTION INFILTRATION; PERINEURAL
Status: DISCONTINUED
Start: 2019-11-10 | End: 2019-11-10 | Stop reason: WASHOUT

## 2019-11-10 RX ORDER — TRANEXAMIC ACID 100 MG/ML
INJECTION, SOLUTION INTRAVENOUS
Status: DISCONTINUED
Start: 2019-11-10 | End: 2019-11-10 | Stop reason: HOSPADM

## 2019-11-10 RX ORDER — LIDOCAINE HYDROCHLORIDE 40 MG/ML
SOLUTION TOPICAL ONCE
Status: COMPLETED | OUTPATIENT
Start: 2019-11-10 | End: 2019-11-10

## 2019-11-10 RX ADMIN — LIDOCAINE HYDROCHLORIDE 4 ML: 40 INJECTION, SOLUTION RETROBULBAR; TOPICAL at 10:44

## 2019-11-10 RX ADMIN — LIDOCAINE HYDROCHLORIDE: 40 SOLUTION TOPICAL at 14:40

## 2019-11-10 RX ADMIN — PHENYLEPHRINE HYDROCHLORIDE: 1 SPRAY NASAL at 10:43

## 2019-11-10 ASSESSMENT — ENCOUNTER SYMPTOMS
SORE THROAT: 0
DIARRHEA: 0
EYE DISCHARGE: 0
SHORTNESS OF BREATH: 0
EYE REDNESS: 0
COUGH: 0
NAUSEA: 0
ABDOMINAL PAIN: 0
VOMITING: 0
RHINORRHEA: 0
WHEEZING: 0
BACK PAIN: 0

## 2019-11-11 ENCOUNTER — CARE COORDINATION (OUTPATIENT)
Dept: OTHER | Facility: CLINIC | Age: 64
End: 2019-11-11

## 2019-11-11 ENCOUNTER — TELEPHONE (OUTPATIENT)
Dept: CARDIOLOGY CLINIC | Age: 64
End: 2019-11-11

## 2019-11-12 ENCOUNTER — OFFICE VISIT (OUTPATIENT)
Dept: ENT CLINIC | Age: 64
End: 2019-11-12
Payer: COMMERCIAL

## 2019-11-12 ENCOUNTER — CARE COORDINATION (OUTPATIENT)
Dept: OTHER | Facility: CLINIC | Age: 64
End: 2019-11-12

## 2019-11-12 VITALS
WEIGHT: 214 LBS | TEMPERATURE: 98.3 F | RESPIRATION RATE: 14 BRPM | HEART RATE: 96 BPM | DIASTOLIC BLOOD PRESSURE: 80 MMHG | SYSTOLIC BLOOD PRESSURE: 138 MMHG | HEIGHT: 74 IN | BODY MASS INDEX: 27.46 KG/M2

## 2019-11-12 DIAGNOSIS — Z79.01 ANTICOAGULATED: ICD-10-CM

## 2019-11-12 DIAGNOSIS — R04.0 EPISTAXIS: Primary | ICD-10-CM

## 2019-11-12 PROCEDURE — 30901 CONTROL OF NOSEBLEED: CPT | Performed by: PHYSICIAN ASSISTANT

## 2019-11-12 ASSESSMENT — ENCOUNTER SYMPTOMS
RHINORRHEA: 0
COLOR CHANGE: 0
VOMITING: 0
DIARRHEA: 0
CHOKING: 0
WHEEZING: 0
APNEA: 0
STRIDOR: 0
SHORTNESS OF BREATH: 0
CHEST TIGHTNESS: 0
NAUSEA: 0
ABDOMINAL PAIN: 0
SINUS PRESSURE: 0
SORE THROAT: 0
FACIAL SWELLING: 0
COUGH: 0
TROUBLE SWALLOWING: 0
VOICE CHANGE: 0

## 2019-11-18 ENCOUNTER — CARE COORDINATION (OUTPATIENT)
Dept: OTHER | Facility: CLINIC | Age: 64
End: 2019-11-18

## 2019-12-09 ENCOUNTER — TELEPHONE (OUTPATIENT)
Dept: ENT CLINIC | Age: 64
End: 2019-12-09

## 2019-12-09 ENCOUNTER — TELEPHONE (OUTPATIENT)
Dept: PULMONOLOGY | Age: 64
End: 2019-12-09

## 2019-12-09 DIAGNOSIS — G47.33 OBSTRUCTIVE SLEEP APNEA: Primary | ICD-10-CM

## 2019-12-28 ENCOUNTER — HOSPITAL ENCOUNTER (EMERGENCY)
Age: 64
Discharge: HOME | End: 2019-12-28
Payer: COMMERCIAL

## 2019-12-28 VITALS
WEIGHT: 208 LBS | BODY MASS INDEX: 26.69 KG/M2 | SYSTOLIC BLOOD PRESSURE: 152 MMHG | TEMPERATURE: 97.6 F | HEIGHT: 74 IN | RESPIRATION RATE: 18 BRPM | HEART RATE: 67 BPM | DIASTOLIC BLOOD PRESSURE: 77 MMHG | OXYGEN SATURATION: 95 %

## 2019-12-28 PROCEDURE — 99213 OFFICE O/P EST LOW 20 MIN: CPT | Performed by: NURSE PRACTITIONER

## 2019-12-28 PROCEDURE — 99212 OFFICE O/P EST SF 10 MIN: CPT

## 2019-12-28 RX ORDER — METHYLPREDNISOLONE 4 MG/1
TABLET ORAL
Qty: 1 KIT | Refills: 0 | Status: SHIPPED | OUTPATIENT
Start: 2019-12-28 | End: 2020-04-29

## 2019-12-28 RX ORDER — AMOXICILLIN 875 MG/1
875 TABLET, COATED ORAL 2 TIMES DAILY
Qty: 20 TABLET | Refills: 0 | Status: SHIPPED | OUTPATIENT
Start: 2019-12-28 | End: 2020-01-07

## 2019-12-28 ASSESSMENT — PAIN SCALES - GENERAL: PAINLEVEL_OUTOF10: 2

## 2019-12-28 ASSESSMENT — PAIN DESCRIPTION - LOCATION: LOCATION: HEAD;THROAT;EAR

## 2019-12-28 NOTE — ED NOTES
Presents with c/o sinus pressure, headache, congestion, ear pressure, sore throat x 2 days.      Caren Larios RN  12/28/19 2911

## 2019-12-28 NOTE — ED NOTES
Discharge assessment complete. No changes. All discharge education and information given. Instructed to go to ED for any worsening symptoms. Verbalized Understanding. Left in stable cond.      Mary Hess RN  12/28/19 [id number]
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
## 2020-02-10 ENCOUNTER — TELEPHONE (OUTPATIENT)
Dept: PULMONOLOGY | Age: 65
End: 2020-02-10

## 2020-02-10 NOTE — TELEPHONE ENCOUNTER
LM with pt to call office patients pap follow up needs to be sooner , pap set up was on 2/7/2020 patient needs appt for 6-8 weeks after.

## 2020-02-18 ENCOUNTER — OFFICE VISIT (OUTPATIENT)
Dept: FAMILY MEDICINE CLINIC | Age: 65
End: 2020-02-18

## 2020-02-18 VITALS
SYSTOLIC BLOOD PRESSURE: 142 MMHG | RESPIRATION RATE: 14 BRPM | HEIGHT: 74 IN | DIASTOLIC BLOOD PRESSURE: 68 MMHG | WEIGHT: 218 LBS | BODY MASS INDEX: 27.98 KG/M2 | HEART RATE: 70 BPM

## 2020-02-18 PROCEDURE — 99213 OFFICE O/P EST LOW 20 MIN: CPT | Performed by: FAMILY MEDICINE

## 2020-02-18 RX ORDER — AMOXICILLIN AND CLAVULANATE POTASSIUM 875; 125 MG/1; MG/1
1 TABLET, FILM COATED ORAL 2 TIMES DAILY
Qty: 20 TABLET | Refills: 0 | Status: SHIPPED | OUTPATIENT
Start: 2020-02-18 | End: 2020-02-28

## 2020-02-18 ASSESSMENT — ENCOUNTER SYMPTOMS
SINUS PRESSURE: 1
COUGH: 1
EYE DISCHARGE: 0
SINUS PAIN: 1
DIARRHEA: 0
SHORTNESS OF BREATH: 0
NAUSEA: 0
SORE THROAT: 1
RHINORRHEA: 0

## 2020-04-29 ENCOUNTER — OFFICE VISIT (OUTPATIENT)
Dept: CARDIOLOGY CLINIC | Age: 65
End: 2020-04-29
Payer: COMMERCIAL

## 2020-04-29 VITALS
DIASTOLIC BLOOD PRESSURE: 87 MMHG | BODY MASS INDEX: 27.86 KG/M2 | WEIGHT: 217 LBS | HEART RATE: 69 BPM | SYSTOLIC BLOOD PRESSURE: 153 MMHG

## 2020-04-29 PROCEDURE — 99213 OFFICE O/P EST LOW 20 MIN: CPT | Performed by: NUCLEAR MEDICINE

## 2020-04-29 RX ORDER — ATORVASTATIN CALCIUM 40 MG/1
40 TABLET, FILM COATED ORAL EVERY OTHER DAY
Qty: 45 TABLET | Refills: 3 | Status: SHIPPED | OUTPATIENT
Start: 2020-04-29 | End: 2021-03-01 | Stop reason: SDUPTHER

## 2020-04-29 RX ORDER — LISINOPRIL 10 MG/1
10 TABLET ORAL 2 TIMES DAILY
Qty: 180 TABLET | Refills: 3 | Status: SHIPPED | OUTPATIENT
Start: 2020-04-29 | End: 2021-03-01 | Stop reason: SDUPTHER

## 2020-04-29 RX ORDER — PRASUGREL 10 MG/1
TABLET, FILM COATED ORAL
Qty: 90 TABLET | Refills: 3 | Status: SHIPPED | OUTPATIENT
Start: 2020-04-29 | End: 2021-03-01 | Stop reason: SDUPTHER

## 2020-04-29 NOTE — PROGRESS NOTES
Global LV dysfunction. EF 40%. Calcified AV w/o obvious stenosis. Mild MR and TR. Dilated ascending aorta. Family History   Problem Relation Age of Onset    Hypertension Father     Stroke Father     Heart Disease Mother     Kidney Disease Mother      Social History     Tobacco Use    Smoking status: Former Smoker     Packs/day: 2.00     Years: 39.00     Pack years: 78.00     Types: Cigarettes     Last attempt to quit: 2008     Years since quittin.0    Smokeless tobacco: Never Used   Substance Use Topics    Alcohol use: No     Alcohol/week: 0.0 standard drinks      Current Outpatient Medications   Medication Sig Dispense Refill    NONFORMULARY Biotene mouth spray nightly      lisinopril (PRINIVIL;ZESTRIL) 10 MG tablet Take 1 tablet by mouth 2 times daily 180 tablet 3    metoprolol tartrate (LOPRESSOR) 25 MG tablet TAKE ONE TABLET BY MOUTH TWICE A  tablet 3    prasugrel (EFFIENT) 10 MG TABS TAKE ONE TABLET BY MOUTH ONE TIME A DAY 90 tablet 3    tamsulosin (FLOMAX) 0.4 MG capsule Take 1 capsule by mouth nightly 90 capsule 3    atorvastatin (LIPITOR) 40 MG tablet Take 1 tablet by mouth every other day 45 tablet 3    Coenzyme Q10 (CO Q 10 PO) Take 200 mg by mouth      CPAP Machine MISC by Does not apply route Please change CPAP pressure to 10 cm H20. 1 each 0    therapeutic multivitamin-minerals (THERAGRAN-M) tablet Take 1 tablet by mouth daily.  aspirin 81 MG EC tablet Take 81 mg by mouth every other day        No current facility-administered medications for this visit.       Allergies   Allergen Reactions    Codeine     Darvon [Propoxyphene Hcl]      Not sure of reaction     Health Maintenance   Topic Date Due    Hepatitis C screen  1955    DTaP/Tdap/Td vaccine (1 - Tdap) 01/15/1972    Low dose CT lung screening  01/15/2008    PSA counseling  2016    Potassium monitoring  10/31/2017    Creatinine monitoring  10/31/2017    Pneumococcal 65+ years Vaccine (1 of 1 - PPSV23) 01/15/2018    Annual Wellness Visit (AWV)  05/29/2019    Lipid screen  05/06/2020    Shingles Vaccine (1 of 2) 05/24/2020 (Originally 1/15/2003)    Flu vaccine (Season Ended) 09/01/2020    Colon cancer screen colonoscopy  05/27/2026    AAA screen  Completed    Hepatitis A vaccine  Aged Out    Hepatitis B vaccine  Aged Out    Hib vaccine  Aged Out    Meningococcal (ACWY) vaccine  Aged Out       Subjective:  Review of Systems  General:   No fever, no chills, some fatigue or weight loss  Pulmonary:    some dyspnea, no wheezing  Cardiac:    Denies recent chest pain,   GI:     No nausea or vomiting, no abdominal pain  Neuro:    No dizziness or light headedness,   Musculoskeletal:  No recent active issues  Extremities:   No edema, no obvious claudication       Objective:  Physical Exam  BP (!) 153/87   Pulse 69   Wt 217 lb (98.4 kg)   BMI 27.86 kg/m²   General:   Well developed, well nourished  Lungs:   Clear to auscultation  Heart:    Normal S1 S2, Slight murmur. no rubs, no gallops  Abdomen:   Soft, non tender, no organomegalies, positive bowel sounds  Extremities:   No edema, no cyanosis, good peripheral pulses  Neurological:   Awake, alert, oriented. No obvious focal deficits  Musculoskelatal:  No obvious deformities    Assessment:      Diagnosis Orders   1. Familial hypercholesterolemia     2. Coronary artery disease with angina pectoris, unspecified vessel or lesion type, unspecified whether native or transplanted heart (HCC)  atorvastatin (LIPITOR) 40 MG tablet    lisinopril (PRINIVIL;ZESTRIL) 10 MG tablet    metoprolol tartrate (LOPRESSOR) 25 MG tablet    prasugrel (EFFIENT) 10 MG TABS   3. Essential hypertension  atorvastatin (LIPITOR) 40 MG tablet    lisinopril (PRINIVIL;ZESTRIL) 10 MG tablet    metoprolol tartrate (LOPRESSOR) 25 MG tablet   cardiac fair for now     Plan:  No follow-ups on file.   As above  Continue risk factor modification and medical management  Thank you for

## 2020-05-21 RX ORDER — TAMSULOSIN HYDROCHLORIDE 0.4 MG/1
0.4 CAPSULE ORAL NIGHTLY
Qty: 90 CAPSULE | Refills: 3 | Status: SHIPPED | OUTPATIENT
Start: 2020-05-21 | End: 2021-03-01 | Stop reason: SDUPTHER

## 2020-09-28 ENCOUNTER — OFFICE VISIT (OUTPATIENT)
Dept: PULMONOLOGY | Age: 65
End: 2020-09-28
Payer: COMMERCIAL

## 2020-09-28 VITALS
TEMPERATURE: 97.8 F | HEART RATE: 74 BPM | BODY MASS INDEX: 28 KG/M2 | HEIGHT: 74 IN | WEIGHT: 218.2 LBS | OXYGEN SATURATION: 95 % | SYSTOLIC BLOOD PRESSURE: 146 MMHG | DIASTOLIC BLOOD PRESSURE: 78 MMHG

## 2020-09-28 PROCEDURE — 99213 OFFICE O/P EST LOW 20 MIN: CPT | Performed by: PHYSICIAN ASSISTANT

## 2020-09-28 ASSESSMENT — ENCOUNTER SYMPTOMS
BACK PAIN: 0
EYES NEGATIVE: 1
ALLERGIC/IMMUNOLOGIC NEGATIVE: 1
COUGH: 0
WHEEZING: 0
STRIDOR: 0
CHEST TIGHTNESS: 0
DIARRHEA: 0
NAUSEA: 0
SHORTNESS OF BREATH: 0

## 2020-09-28 NOTE — PROGRESS NOTES
[facility]      Hernandez Stokes         [id number]  9/28/2020   Chief Complaint   Patient presents with    Follow-up     REANNA 6-8 week sleep follow up with [facility] download         Pt of Dr. Roro Arauz     PAP Download:   Diane Judd or initial AHI: 4.8     Date of initial study: 4/29/2011      Compliant  100%     Noncompliant 0 %     PAP Type airsense 10 Level  11 cmh2o    Avg Hrs/Day 7:22  AHI: 1.3   Recorded compliance dates , 8/23/20-9/21/20  Machine/Mfg:   [x] ResMed    [] Respironics/Dreamstation   Interface:   [] Nasal    [] Nasal pillows   [x] FFM      Provider:      [x] PORTER     []Rivka     [] Laila    [] Danial Sabillon    [] Gómez               [] [facility]      [] Adaptive    [] [facility]:      [] Other    Neck Size: 15  Mallampati Mallampati 3  ESS:  4  SAQLI:     Here is a scan of the most recent download:          Presentation:   Robbie Bey presents for sleep medicine follow up for obstructive sleep apnea  Since the last visit, Robbie Bey is doing well with new PAP. He is sleeping ok but daughter wakes up through the night. She is MRDD. Equipment issues: The pressure is  acceptable, the mask is acceptable     Sleep issues:  Do you feel better? Yes  More rested? Yes   Better concentration? yes    Progress History:   Since last visit any new medical issues? No  New ER or hospitlal visits? No  Any new or changes in medicines? No  Any new sleep medicines?  No        Past Medical History:   Diagnosis Date    Bleeding tendency ([address]     Patient states \"From medications\"    CAD (coronary artery disease)     Chest pain     Fatigue     HLD (hyperlipidemia)     Hypertension     MI (myocardial infarction) ([address]     Snores     SOB (shortness of breath)        Past Surgical History:   Procedure Laterality Date    CARDIAC CATHETERIZATION  4-21-08    Significant vasculopathy w/ severe disease in the coronary arteries, pretty much aneurysmal and diffuse in nature everywhere, involving pretty much every segment of coronary arteries. Critical stenosis of the left circ. which is likely to be culprit vessel. Significant aortic disease as well. Mild LV dysfunction.  CARDIOVASCULAR STRESS TEST  11    Moderate inferolateral infarct w/o obvious significant stress induced ischemia. EF 58%. Mild wall motion abnormality.  CORONARY ANGIOPLASTY WITH STENT PLACEMENT  13    CORONARY ANGIOPLASTY WITH STENT PLACEMENT  10/30/2016    3 stent by Dr Norvel Severs  [id number]   [facility] 26      TRANSTHORACIC ECHOCARDIOGRAM  3-31-11    LV size normal. Systolic function mildly reduced. EF 50-55%. Mild diffuse hypokinesis. Wall thickness mildly increased. Mild concentric LVH.  TRANSTHORACIC ECHOCARDIOGRAM  08    Global LV dysfunction. EF 40%. Calcified AV w/o obvious stenosis. Mild MR and TR. Dilated ascending aorta.        Social History     Tobacco Use    Smoking status: Former Smoker     Packs/day: 2.00     Years: 39.00     Pack years: 78.00     Types: Cigarettes     Last attempt to quit: 2008     Years since quittin.4    Smokeless tobacco: Never Used   Substance Use Topics    Alcohol use: No     Alcohol/week: 0.0 standard drinks    Drug use: No       Allergies   Allergen Reactions    Codeine     Darvon [Propoxyphene Hcl]      Not sure of reaction       Current Outpatient Medications   Medication Sig Dispense Refill    tamsulosin (FLOMAX) 0.4 MG capsule Take 1 capsule by mouth nightly 90 capsule 3    atorvastatin (LIPITOR) 40 MG tablet Take 1 tablet by mouth every other day 45 tablet 3    lisinopril (PRINIVIL;ZESTRIL) 10 MG tablet Take 1 tablet by mouth 2 times daily 180 tablet 3    metoprolol tartrate (LOPRESSOR) 25 MG tablet TAKE ONE TABLET BY MOUTH TWICE A  tablet 3    prasugrel (EFFIENT) 10 MG TABS TAKE ONE TABLET BY MOUTH ONE TIME A DAY 90 tablet 3    NONFORMULARY Biotene mouth spray nightly      Coenzyme Q10 (CO Q 10 PO) Take 200 mg by mouth      CPAP Machine MISC by Does not apply route Please change CPAP pressure to 10 cm H20. 1 each 0    therapeutic multivitamin-minerals (THERAGRAN-M) tablet Take 1 tablet by mouth daily.  aspirin 81 MG EC tablet Take 81 mg by mouth every other day        No current facility-administered medications for this visit. Family History   Problem Relation Age of Onset    Hypertension Father     Stroke Father     Heart Disease Mother     Kidney Disease Mother         Review of Systems -   Review of Systems   Constitutional: Negative for activity change, appetite change, chills and fever. HENT: Negative for congestion and postnasal drip. Eyes: Negative. Respiratory: Negative for cough, chest tightness, shortness of breath, wheezing and stridor. Cardiovascular: Negative for chest pain and leg swelling. Gastrointestinal: Negative for diarrhea and nausea. Endocrine: Negative. Genitourinary: Negative. Musculoskeletal: Negative. Negative for arthralgias and back pain. Skin: Negative. Allergic/Immunologic: Negative. Neurological: Negative. Negative for dizziness and light-headedness. Psychiatric/Behavioral: Negative. All other systems reviewed and are negative. Physical Exam:    BMI:  Body mass index is 28.02 kg/m². Wt Readings from Last 3 Encounters:   09/28/20 218 lb 3.2 oz (99 kg)   04/29/20 217 lb (98.4 kg)   02/18/20 218 lb (98.9 kg)     Weight stable / unchanged  Vitals: BP (!) 146/78 (Site: Right Upper Arm, Position: Sitting, Cuff Size: Medium Adult)   Pulse 74   Temp 97.8 °F (36.6 °C)   Ht 6' 2\" (1.88 m)   Wt 218 lb 3.2 oz (99 kg)   SpO2 95% Comment: on RA  BMI 28.02 kg/m²       Physical Exam  Constitutional:       Appearance: He is well-developed. HENT:      Head: Normocephalic and atraumatic.       Right Ear: External ear normal.      Left Ear: External ear normal.   Eyes: Conjunctiva/sclera: Conjunctivae normal.      Pupils: Pupils are equal, round, and reactive to light. Neck:      Musculoskeletal: Normal range of motion and neck supple. Cardiovascular:      Rate and Rhythm: Normal rate and regular rhythm. Heart sounds: Normal heart sounds. Pulmonary:      Effort: Pulmonary effort is normal.      Breath sounds: Normal breath sounds. Musculoskeletal: Normal range of motion. Skin:     General: Skin is warm and dry. Neurological:      Mental Status: He is alert and oriented to person, place, and time. Psychiatric:         Behavior: Behavior normal.         Thought Content: Thought content normal.         Judgment: Judgment normal.           ASSESSMENT/DIAGNOSIS     Diagnosis Orders   1. Obstructive sleep apnea            Plan   Do you need any equipment today? Yes update supplies    - He  was advised to continue current positive airway pressure therapy with above described pressure. - He  advised to keep good compliance with current recommended pressure to get optimal results and clinical improvement  - Recommend 7-9 hours of sleep with PAP  - He was advised to call [employer] regarding supplies if needed.   -He call my office for earlier appointment if needed for worsening of sleep symptoms.   - He was instructed on weight loss  - Erin Acosta was educated about my impression and plan. Patient verbalizesunderstanding.   We will see Martine Garza back in: 1 year with download    Information added by my medical assistant/LPN was reviewed today          Oriana Lopez PA-C, MPAS  9/28/2020

## 2020-12-02 ENCOUNTER — TELEPHONE (OUTPATIENT)
Dept: FAMILY MEDICINE CLINIC | Age: 65
End: 2020-12-02

## 2020-12-02 ENCOUNTER — HOSPITAL ENCOUNTER (OUTPATIENT)
Age: 65
Setting detail: SPECIMEN
Discharge: HOME | End: 2020-12-02
Payer: COMMERCIAL

## 2020-12-02 PROCEDURE — U0003 INFECTIOUS AGENT DETECTION BY NUCLEIC ACID (DNA OR RNA); SEVERE ACUTE RESPIRATORY SYNDROME CORONAVIRUS 2 (SARS-COV-2) (CORONAVIRUS DISEASE [COVID-19]), AMPLIFIED PROBE TECHNIQUE, MAKING USE OF HIGH THROUGHPUT TECHNOLOGIES AS DESCRIBED BY CMS-2020-01-R: HCPCS

## 2020-12-02 NOTE — TELEPHONE ENCOUNTER
Patience Screen, wife called said that he has a little bit of a cold, nothing more but has close contact with his daughter Londolores Levineant who is having symptoms. Wondering if can get tested?     Patience Screen can be reached at [phone]

## 2020-12-04 LAB — SARS-COV-2: DETECTED

## 2020-12-07 ENCOUNTER — TELEPHONE (OUTPATIENT)
Dept: FAMILY MEDICINE CLINIC | Age: 65
End: 2020-12-07

## 2020-12-07 NOTE — TELEPHONE ENCOUNTER
----- Message from Jamal Ovalle MD sent at 12/7/2020  2:13 PM EST -----  Let him know the covid swab was negative

## 2020-12-07 NOTE — TELEPHONE ENCOUNTER
----- Message from Chava Nieto MD sent at 12/7/2020  2:15 PM EST -----  Sorry, that the swab was positive.

## 2020-12-10 ENCOUNTER — HOSPITAL ENCOUNTER (EMERGENCY)
Age: 65
Discharge: HOME | End: 2020-12-10
Payer: COMMERCIAL

## 2020-12-10 ENCOUNTER — TELEPHONE (OUTPATIENT)
Dept: FAMILY MEDICINE CLINIC | Age: 65
End: 2020-12-10

## 2020-12-10 ENCOUNTER — APPOINTMENT (OUTPATIENT)
Dept: GENERAL RADIOLOGY | Age: 65
End: 2020-12-10
Payer: COMMERCIAL

## 2020-12-10 VITALS
SYSTOLIC BLOOD PRESSURE: 142 MMHG | DIASTOLIC BLOOD PRESSURE: 79 MMHG | HEIGHT: 74 IN | RESPIRATION RATE: 20 BRPM | TEMPERATURE: 98.4 F | WEIGHT: 212 LBS | OXYGEN SATURATION: 92 % | BODY MASS INDEX: 27.21 KG/M2 | HEART RATE: 70 BPM

## 2020-12-10 LAB
ALBUMIN SERPL-MCNC: 4.2 G/DL (ref 3.5–5.1)
ALP BLD-CCNC: 114 U/L (ref 38–126)
ALT SERPL-CCNC: 23 U/L (ref 11–66)
ANION GAP SERPL CALCULATED.3IONS-SCNC: 12 MEQ/L (ref 8–16)
AST SERPL-CCNC: 26 U/L (ref 5–40)
BASOPHILS # BLD: 0.2 %
BASOPHILS ABSOLUTE: 0 THOU/MM3 (ref 0–0.1)
BILIRUB SERPL-MCNC: 0.6 MG/DL (ref 0.3–1.2)
BUN BLDV-MCNC: 15 MG/DL (ref 7–22)
C-REACTIVE PROTEIN: 0.61 MG/DL (ref 0–1)
CALCIUM SERPL-MCNC: 9.6 MG/DL (ref 8.5–10.5)
CHLORIDE BLD-SCNC: 98 MEQ/L (ref 98–111)
CO2: 26 MEQ/L (ref 23–33)
CREAT SERPL-MCNC: 0.7 MG/DL (ref 0.4–1.2)
D-DIMER QUANTITATIVE: 281 NG/ML FEU (ref 0–500)
EKG ATRIAL RATE: 75 BPM
EKG P AXIS: 29 DEGREES
EKG P-R INTERVAL: 142 MS
EKG Q-T INTERVAL: 344 MS
EKG QRS DURATION: 88 MS
EKG QTC CALCULATION (BAZETT): 384 MS
EKG R AXIS: 20 DEGREES
EKG T AXIS: 96 DEGREES
EKG VENTRICULAR RATE: 75 BPM
EOSINOPHIL # BLD: 0.4 %
EOSINOPHILS ABSOLUTE: 0 THOU/MM3 (ref 0–0.4)
ERYTHROCYTE [DISTWIDTH] IN BLOOD BY AUTOMATED COUNT: 13 % (ref 11.5–14.5)
ERYTHROCYTE [DISTWIDTH] IN BLOOD BY AUTOMATED COUNT: 43.4 FL (ref 35–45)
GFR SERPL CREATININE-BSD FRML MDRD: > 90 ML/MIN/1.73M2
GLUCOSE BLD-MCNC: 96 MG/DL (ref 70–108)
HCT VFR BLD CALC: 45.5 % (ref 42–52)
HEMOGLOBIN: 15.6 GM/DL (ref 14–18)
IMMATURE GRANS (ABS): 0.03 THOU/MM3 (ref 0–0.07)
IMMATURE GRANULOCYTES: 0.6 %
LD: 225 U/L (ref 100–190)
LYMPHOCYTES # BLD: 25.8 %
LYMPHOCYTES ABSOLUTE: 1.2 THOU/MM3 (ref 1–4.8)
MCH RBC QN AUTO: 31.4 PG (ref 26–33)
MCHC RBC AUTO-ENTMCNC: 34.3 GM/DL (ref 32.2–35.5)
MCV RBC AUTO: 91.5 FL (ref 80–94)
MONOCYTES # BLD: 15.5 %
MONOCYTES ABSOLUTE: 0.7 THOU/MM3 (ref 0.4–1.3)
NUCLEATED RED BLOOD CELLS: 0 /100 WBC
OSMOLALITY CALCULATION: 272.7 MOSMOL/KG (ref 275–300)
PLATELET # BLD: 130 THOU/MM3 (ref 130–400)
PMV BLD AUTO: 10.6 FL (ref 9.4–12.4)
POTASSIUM REFLEX MAGNESIUM: 4 MEQ/L (ref 3.5–5.2)
PRO-BNP: 199.3 PG/ML (ref 0–900)
PROCALCITONIN: 0.06 NG/ML (ref 0.01–0.09)
RBC # BLD: 4.97 MILL/MM3 (ref 4.7–6.1)
SEG NEUTROPHILS: 57.5 %
SEGMENTED NEUTROPHILS ABSOLUTE COUNT: 2.8 THOU/MM3 (ref 1.8–7.7)
SODIUM BLD-SCNC: 136 MEQ/L (ref 135–145)
TOTAL PROTEIN: 6.2 G/DL (ref 6.1–8)
TROPONIN T: < 0.01 NG/ML
WBC # BLD: 4.8 THOU/MM3 (ref 4.8–10.8)

## 2020-12-10 PROCEDURE — 83880 ASSAY OF NATRIURETIC PEPTIDE: CPT

## 2020-12-10 PROCEDURE — 84145 PROCALCITONIN (PCT): CPT

## 2020-12-10 PROCEDURE — 85379 FIBRIN DEGRADATION QUANT: CPT

## 2020-12-10 PROCEDURE — 83615 LACTATE (LD) (LDH) ENZYME: CPT

## 2020-12-10 PROCEDURE — 84484 ASSAY OF TROPONIN QUANT: CPT

## 2020-12-10 PROCEDURE — 86140 C-REACTIVE PROTEIN: CPT

## 2020-12-10 PROCEDURE — 71045 X-RAY EXAM CHEST 1 VIEW: CPT

## 2020-12-10 PROCEDURE — 93005 ELECTROCARDIOGRAM TRACING: CPT | Performed by: EMERGENCY MEDICINE

## 2020-12-10 PROCEDURE — 93010 ELECTROCARDIOGRAM REPORT: CPT | Performed by: NUCLEAR MEDICINE

## 2020-12-10 PROCEDURE — 85025 COMPLETE CBC W/AUTO DIFF WBC: CPT

## 2020-12-10 PROCEDURE — 6360000002 HC RX W HCPCS: Performed by: EMERGENCY MEDICINE

## 2020-12-10 PROCEDURE — 36415 COLL VENOUS BLD VENIPUNCTURE: CPT

## 2020-12-10 PROCEDURE — 99284 EMERGENCY DEPT VISIT MOD MDM: CPT

## 2020-12-10 PROCEDURE — 80053 COMPREHEN METABOLIC PANEL: CPT

## 2020-12-10 RX ORDER — DEXAMETHASONE 6 MG/1
6 TABLET ORAL 2 TIMES DAILY WITH MEALS
Qty: 10 TABLET | Refills: 0 | Status: SHIPPED | OUTPATIENT
Start: 2020-12-10 | End: 2020-12-15

## 2020-12-10 RX ORDER — DEXAMETHASONE 4 MG/1
6 TABLET ORAL ONCE
Status: COMPLETED | OUTPATIENT
Start: 2020-12-10 | End: 2020-12-10

## 2020-12-10 RX ADMIN — DEXAMETHASONE 6 MG: 4 TABLET ORAL at 18:24

## 2020-12-10 ASSESSMENT — ENCOUNTER SYMPTOMS
SORE THROAT: 0
ABDOMINAL PAIN: 0
SHORTNESS OF BREATH: 1
ABDOMINAL DISTENTION: 0
DIARRHEA: 0
COLOR CHANGE: 0
VOMITING: 0
BACK PAIN: 0
NAUSEA: 0
COUGH: 1

## 2020-12-10 NOTE — ED PROVIDER NOTES
[address] COMPLAINT       Chief Complaint   Patient presents with    Shortness of Breath    Cough       Nurses Notes reviewed and I agree except as noted in the HPI. HISTORY OF PRESENT ILLNESS    Henry Chew is a 77 y.o. male who presents to the Emergency Department for the evaluation of shortness of breath. Patient was diagnosed with COVID-19 approximately 2 weeks ago. He states his symptoms were tolerable. He states in the last 24 hours he has noticeable increase shortness of breath. He has frequent cough. He gets winded with ambulation. Patient has past medical history of coronary artery disease. Patient had three-vessel CABG in 2008. He has had heart attacks since his CABG and has had stent placement after his CABG. Patient has no known fevers. He does have body aches and occasional chills. He does feel more fatigued than typical.  No leg swelling. No recent weight gain. No chest pain. The HPI was provided by the patient. REVIEW OF SYSTEMS     Review of Systems   Constitutional: Positive for fatigue. Negative for fever. HENT: Negative for sore throat. Respiratory: Positive for cough and shortness of breath. Cardiovascular: Negative for chest pain, palpitations and leg swelling. Gastrointestinal: Negative for abdominal distention, abdominal pain, diarrhea, nausea and vomiting. Musculoskeletal: Negative for back pain. Skin: Negative for color change. Neurological: Positive for weakness. Negative for dizziness and light-headedness. Psychiatric/Behavioral: Negative for behavioral problems. PAST MEDICAL HISTORY    has a past medical history of Bleeding tendency ([address] CAD (coronary artery disease), Chest pain, Fatigue, HLD (hyperlipidemia), Hypertension, MI (myocardial infarction) ([address] Snores, and SOB (shortness of breath).     SURGICAL HISTORY      has a past surgical history that includes he does not drink alcohol or use drugs. PHYSICAL EXAM     INITIAL VITALS:  height is 6' 2\" (1.88 m) and weight is 212 lb (96.2 kg). His oral temperature is 98.4 °F (36.9 °C). His blood pressure is 142/79 (abnormal) and his pulse is 70. His respiration is 20 and oxygen saturation is 92%. Physical Exam  Constitutional:       General: He is not in acute distress. Appearance: He is normal weight. He is not ill-appearing. HENT:      Head: Normocephalic. Nose: Nose normal.      Mouth/Throat:      Mouth: Mucous membranes are moist.   Cardiovascular:      Rate and Rhythm: Normal rate. Pulses: Normal pulses. Heart sounds: Normal heart sounds. Pulmonary:      Effort: No respiratory distress. Breath sounds: Rales (bilateral bases) present. No wheezing. Abdominal:      General: Abdomen is flat. Bowel sounds are normal. There is no distension. Tenderness: There is no abdominal tenderness. There is no guarding. Skin:     General: Skin is warm and dry. Capillary Refill: Capillary refill takes less than 2 seconds. Neurological:      General: No focal deficit present. Mental Status: He is alert. Psychiatric:         Mood and Affect: Mood normal.         Behavior: Behavior normal.          DIFFERENTIAL DIAGNOSIS:   Bacterial pneumonia, Covid pneumonia, CHF, pleural effusion, pulmonary embolism, ACS    DIAGNOSTIC RESULTS     EKG: All EKG's are interpreted by the Emergency Department Physician who either signs or Co-signs this chart in the absence of a cardiologist.    Normal sinus rhythm ventricular rate 75 bpm.  NV interval 142, QRS duration 88, corrected  ms. RADIOLOGY: non-plainfilm images(s) such as CT, Ultrasound and MRI are read by the radiologist.    XR CHEST PORTABLE   Final Result   Stable chest            **This report has been created using voice recognition software. It may contain minor errors which are inherent in voice recognition technology. ** Final report electronically signed by Dr. Nathen Yanez on 12/10/2020 5:07 PM          LABS:     Labs Reviewed   LACTATE DEHYDROGENASE - Abnormal; Notable for the following components:       Result Value     (*)     All other components within normal limits   OSMOLALITY - Abnormal; Notable for the following components:    Osmolality Calc 272.7 (*)     All other components within normal limits   CBC WITH AUTO DIFFERENTIAL   COMPREHENSIVE METABOLIC PANEL W/ REFLEX TO MG FOR LOW K   TROPONIN   D-DIMER, QUANTITATIVE   PROCALCITONIN   BRAIN NATRIURETIC PEPTIDE   C-REACTIVE PROTEIN   ANION GAP   GLOMERULAR FILTRATION RATE, ESTIMATED       EMERGENCY DEPARTMENT COURSE:   Vitals:    Vitals:    12/10/20 1601 12/10/20 1732   BP: (!) 154/81 (!) 142/79   Pulse: 73 70   Resp: 22 20   Temp: 98.4 °F (36.9 °C)    TempSrc: Oral    SpO2: 95% 92%   Weight: 212 lb (96.2 kg)    Height: 6' 2\" (1.88 m)        4:24 PM EST: The patient was seen and evaluated. Appropriate labs and imaging are ordered. Patient's labs are reassuring. His troponin is less than 0.01. His D-dimer 281.0. White blood cell count 4.8. Hemoglobin 15.6. Hematocrit 45. 5. proBNP 199.3. Mild CRP elevation 0.61. . Chest x-ray appears stable with no acute findings. MDM:  The patient has known Covid disease. He has dyspnea that has developed over the past 24 to 48 hours. Patient's pulse oximetry on room air at rest 94%. He was ambulated throughout the hallway and his O2 saturations remained 92 to 94% with exertion. I will place patient on dexamethasone for treatment of his symptoms. Patient is also advised take vitamin C, vitamin D, zinc for treatment of Covid symptoms. Advised to drink plenty fluids. Follow-up primary care provider next week for reevaluation. Return for worsening chest pain, shortness of breath, passing out, uncontrolled temperature 100.5 or greater, new concerns.   Patient verbalized understanding of all instructions. He is advised to get an over-the-counter pulse oximetry from local pharmacy to evaluate his pulse oximetry at home. Advised to return for O2 saturations that are dropping below 90%. CRITICAL CARE:   None    CONSULTS:  None    PROCEDURES:  None    FINAL IMPRESSION      1. Cough    2. Chest pain, unspecified type    3. COVID-19          DISPOSITION/PLAN   discharge    PATIENT REFERRED TO:  Cricket Deleon MD  [address]  [phone]            DISCHARGE MEDICATIONS:  Discharge Medication List as of 12/10/2020  6:12 PM      START taking these medications    Details   dexamethasone (DECADRON) 6 MG tablet Take 1 tablet by mouth 2 times daily (with meals) for 5 days, Disp-10 tablet,R-0Print             (Please note that portions of this note were completed with a voice recognition program.  Efforts were made to edit the dictations but occasionally words are mis-transcribed.)    The patient was given an opportunity to see the Emergency Attending. The patient voiced understanding that I was a Mid-LevelProvider and was in agreement with being seen independently by myself.           Etienne Rivera, VAL - KADY  12/10/20 1946

## 2020-12-10 NOTE — TELEPHONE ENCOUNTER
Pt called said that he has been having trouble the last couple of nights breathing when he lays down. Said that this morning he is still having trouble breathing. Said that he does not have a pulse ox. Pt is wanting advice on what to do.

## 2020-12-10 NOTE — ED TRIAGE NOTES
Patient presents to the ER with shortness of breath and a cough that started 3 days ago at home. Patient reported he tested positive for COVID 14 days ago via nasal swab. Patient denies any pain. Telemetry applied.

## 2020-12-10 NOTE — TELEPHONE ENCOUNTER
I checked with a nurse and she said that yes Dr Paulina Maya would want pt to go and be evaluated. Pt informed and agreed to go.

## 2020-12-11 ENCOUNTER — CARE COORDINATION (OUTPATIENT)
Dept: OTHER | Facility: CLINIC | Age: 65
End: 2020-12-11

## 2020-12-11 NOTE — CARE COORDINATION
[facility] [facility] ED Follow Up Call    2020    Patient: Lorenzo Elizondo Patient : 1955   MRN: [id number]  Reason for Admission: COVID positive, cough, shortness of breath, fatigue, weakness  Discharge Date: 12/10/2020         Patient contacted regarding COVID-19 diagnosis\". Discussed COVID-19 related testing which was available at this time. Test results were positive. Patient informed of results, if available? Yes    Care Transition Nurse/ Ambulatory Care Manager contacted the patient by telephone to perform post discharge assessment. Call within 2 business days of discharge: Yes. Verified name and  with patient as identifiers. Provided introduction to self, and explanation of the CTN/ACM role, and reason for call due to risk factors for infection and/or exposure to COVID-19. Symptoms reviewed with patient who verbalized the following symptoms: cough, shortness of breath and sweating. Patient states he is feeling much better. Due to no new or worsening symptoms encounter was not routed to provider for escalation. Discussed follow-up appointments. If no appointment was previously scheduled, appointment scheduling offered: Yes- patient states he will contact [facility] follow up appointment(s):   Future Appointments   Date Time Provider Alexandre Murray   2021  9:30 AM Carmela Qureshi, [address]       Non-face-to-face services provided:  Obtained and reviewed discharge summary and/or continuity of care documents     Patient has following risk factors of: REANNA and history of MI. CTN/ACM reviewed discharge instructions, medical action plan and red flags such as increased shortness of breath, increasing fever and signs of decompensation with patient who verbalized understanding. Discussed exposure protocols and quarantine with CDC Guidelines What to do if you are sick with coronavirus disease .  Patient was given an opportunity for questions and concerns.  The patient agrees to contact the [facility] exposure line [phone], local [facility] department [place] [facility]: ([phone]) and PCP office for questions related to their healthcare. CTN/ACM provided contact information for future needs. Reviewed and educated patient on any new and changed medications related to discharge diagnosis       Plan for follow-up call in 5-7 days based on severity of symptoms and risk factors.

## 2020-12-11 NOTE — CARE COORDINATION
[address] [facility] ED Follow Up Call    2020    Patient: David Salvage Patient : 1955   MRN: [id number]  Reason for Admission: COVID positive, cough, shortness of breath, fatigue, weakness  Discharge Date: 12/10/2020      Date/Time:  2020 10:49 AM  Attempted to reach patient by telephone. Call within 2 business days of discharge: Yes Left HIPPA compliant message requesting a return call. Will attempt to reach patient again.

## 2020-12-18 ENCOUNTER — CARE COORDINATION (OUTPATIENT)
Dept: OTHER | Facility: CLINIC | Age: 65
End: 2020-12-18

## 2020-12-22 ENCOUNTER — CARE COORDINATION (OUTPATIENT)
Dept: OTHER | Facility: CLINIC | Age: 65
End: 2020-12-22

## 2020-12-22 NOTE — CARE COORDINATION
[facility] Follow Up Call    2020    Patient: Randolm Apgar  Patient : 1955   MRN: [id number]  Reason for Admission: COVID  Discharge Date: 12/10/20 RARS: No data recorded     Date/Time:  2020 3:58 PM  Attempted to reach patient by telephone. Left HIPPA compliant message. No further outreach scheduled with this CTN/ACM. Episode of Care resolved. Patient has this CTN/ACM contact information if future needs arise.       Follow Up  Future Appointments   Date Time Provider Alexandre Murray   2021  9:30 AM Cleophas Lanes, [place] [address] [place] [address]

## 2020-12-23 ENCOUNTER — TELEPHONE (OUTPATIENT)
Dept: ENT CLINIC | Age: 65
End: 2020-12-23

## 2020-12-23 RX ORDER — FLUTICASONE PROPIONATE 50 MCG
1 SPRAY, SUSPENSION (ML) NASAL DAILY
Qty: 1 BOTTLE | Refills: 2 | Status: SHIPPED | OUTPATIENT
Start: 2020-12-23 | End: 2021-03-01 | Stop reason: SDUPTHER

## 2020-12-23 NOTE — TELEPHONE ENCOUNTER
I called the patient to discuss. He states that he is post Covid in early December and has had issues with congestion since then. He states that the Flonase seems to help with his congestion. I explained to the patient my concerns of increasing his risk for nosebleeds with using it. He states he does not use it routinely and he still has the original prescription from when he was seen almost a year and a half ago. He states he also uses Ayr  Spray at least once a day to keep his nose moist and prevent nosebleeds. I discussed using Afrin for couple days or intermittently at night before bed to help with his congestion. We discussed the risk of using Afrin routinely/daily. He expresses understanding and states he recalls this conversation when he was in the office for nosebleeds. I informed her that since he is doing well with nosebleeds and using nasal saline routinely that I will write for Flonase. He states he will continue to just use it intermittently and will also try intermittent use of Afrin as well. He thanked me for the call and help. He will contact our office with further questions or concerns.

## 2020-12-23 NOTE — TELEPHONE ENCOUNTER
Patient is calling in wanting to know if you can prescribe him some more fluticasone. I did tell him that it has been over a year since he has been seen and you may or may not prescribe it. Pharmacy is [facility] on [address].

## 2021-03-01 ENCOUNTER — OFFICE VISIT (OUTPATIENT)
Dept: FAMILY MEDICINE CLINIC | Age: 66
End: 2021-03-01

## 2021-03-01 VITALS
BODY MASS INDEX: 28.14 KG/M2 | SYSTOLIC BLOOD PRESSURE: 140 MMHG | WEIGHT: 219.25 LBS | HEIGHT: 74 IN | RESPIRATION RATE: 14 BRPM | TEMPERATURE: 96.7 F | DIASTOLIC BLOOD PRESSURE: 78 MMHG | HEART RATE: 68 BPM

## 2021-03-01 DIAGNOSIS — J31.0 CHRONIC RHINITIS: ICD-10-CM

## 2021-03-01 DIAGNOSIS — I10 ESSENTIAL HYPERTENSION: ICD-10-CM

## 2021-03-01 DIAGNOSIS — M70.22 OLECRANON BURSITIS OF LEFT ELBOW: ICD-10-CM

## 2021-03-01 DIAGNOSIS — I25.119 CORONARY ARTERY DISEASE WITH ANGINA PECTORIS, UNSPECIFIED VESSEL OR LESION TYPE, UNSPECIFIED WHETHER NATIVE OR TRANSPLANTED HEART (HCC): ICD-10-CM

## 2021-03-01 DIAGNOSIS — N40.1 BENIGN PROSTATIC HYPERPLASIA WITH WEAK URINARY STREAM: ICD-10-CM

## 2021-03-01 DIAGNOSIS — Z12.5 SCREENING FOR MALIGNANT NEOPLASM OF PROSTATE: Primary | ICD-10-CM

## 2021-03-01 DIAGNOSIS — R39.12 BENIGN PROSTATIC HYPERPLASIA WITH WEAK URINARY STREAM: ICD-10-CM

## 2021-03-01 PROCEDURE — 99397 PER PM REEVAL EST PAT 65+ YR: CPT | Performed by: FAMILY MEDICINE

## 2021-03-01 RX ORDER — PRASUGREL 10 MG/1
TABLET, FILM COATED ORAL
Qty: 90 TABLET | Refills: 3 | Status: SHIPPED | OUTPATIENT
Start: 2021-03-01 | End: 2022-01-28 | Stop reason: SDUPTHER

## 2021-03-01 RX ORDER — TAMSULOSIN HYDROCHLORIDE 0.4 MG/1
0.4 CAPSULE ORAL NIGHTLY
Qty: 90 CAPSULE | Refills: 3 | Status: SHIPPED | OUTPATIENT
Start: 2021-03-01 | End: 2022-01-28 | Stop reason: SDUPTHER

## 2021-03-01 RX ORDER — FLUTICASONE PROPIONATE 50 MCG
1 SPRAY, SUSPENSION (ML) NASAL DAILY
Qty: 1 BOTTLE | Refills: 3 | Status: SHIPPED | OUTPATIENT
Start: 2021-03-01 | End: 2022-01-28 | Stop reason: SDUPTHER

## 2021-03-01 RX ORDER — ATORVASTATIN CALCIUM 40 MG/1
40 TABLET, FILM COATED ORAL EVERY OTHER DAY
Qty: 45 TABLET | Refills: 3 | Status: SHIPPED | OUTPATIENT
Start: 2021-03-01 | End: 2022-01-28 | Stop reason: SDUPTHER

## 2021-03-01 RX ORDER — LISINOPRIL 10 MG/1
10 TABLET ORAL 2 TIMES DAILY
Qty: 180 TABLET | Refills: 3 | Status: SHIPPED | OUTPATIENT
Start: 2021-03-01 | End: 2022-01-28 | Stop reason: SDUPTHER

## 2021-03-01 SDOH — ECONOMIC STABILITY: TRANSPORTATION INSECURITY
IN THE PAST 12 MONTHS, HAS THE LACK OF TRANSPORTATION KEPT YOU FROM MEDICAL APPOINTMENTS OR FROM GETTING MEDICATIONS?: NO

## 2021-03-01 SDOH — ECONOMIC STABILITY: FOOD INSECURITY: WITHIN THE PAST 12 MONTHS, YOU WORRIED THAT YOUR FOOD WOULD RUN OUT BEFORE YOU GOT MONEY TO BUY MORE.: NEVER TRUE

## 2021-03-01 SDOH — ECONOMIC STABILITY: TRANSPORTATION INSECURITY
IN THE PAST 12 MONTHS, HAS LACK OF TRANSPORTATION KEPT YOU FROM MEETINGS, WORK, OR FROM GETTING THINGS NEEDED FOR DAILY LIVING?: NO

## 2021-03-01 ASSESSMENT — ENCOUNTER SYMPTOMS
CONSTIPATION: 0
SHORTNESS OF BREATH: 0
SINUS PRESSURE: 0

## 2021-03-01 ASSESSMENT — PATIENT HEALTH QUESTIONNAIRE - PHQ9
1. LITTLE INTEREST OR PLEASURE IN DOING THINGS: 0
SUM OF ALL RESPONSES TO PHQ9 QUESTIONS 1 & 2: 0
SUM OF ALL RESPONSES TO PHQ QUESTIONS 1-9: 0

## 2021-03-01 NOTE — PATIENT INSTRUCTIONS
Do the non fasting PSA soon  Wear an elbow sleeve on the left to see if it helps the swelling after 3-4 weeks.  If the swelling is still there see myself or the orthopedic doctor  See me in a year

## 2021-03-01 NOTE — PROGRESS NOTES
Huma Bentley (:  1955) is a 74 y.o. male,Established patient, here for evaluation of the following chief complaint(s): Other (elbow ) and Wellness Program      ASSESSMENT/PLAN:   Diagnosis Orders   1. Screening for malignant neoplasm of prostate  PSA screening   2. Benign prostatic hyperplasia with weak urinary stream  tamsulosin (FLOMAX) 0.4 MG capsule   3. Coronary artery disease with angina pectoris, unspecified vessel or lesion type, unspecified whether native or transplanted heart (HCC)  atorvastatin (LIPITOR) 40 MG tablet    lisinopril (PRINIVIL;ZESTRIL) 10 MG tablet    metoprolol tartrate (LOPRESSOR) 25 MG tablet    prasugrel (EFFIENT) 10 MG TABS   4. Essential hypertension  atorvastatin (LIPITOR) 40 MG tablet    lisinopril (PRINIVIL;ZESTRIL) 10 MG tablet    metoprolol tartrate (LOPRESSOR) 25 MG tablet   5. Chronic rhinitis  fluticasone (FLONASE) 50 MCG/ACT nasal spray   6. Olecranon bursitis of left elbow  XR ELBOW LEFT (MIN 3 VIEWS)     Do the non fasting PSA soon  Wear an elbow sleeve on the left to see if it helps the swelling after 3-4 weeks. If the swelling is still there see myself or the orthopedic doctor  See me in a year    SUBJECTIVE/OBJECTIVE:  HPI  Well Adult Physical   Patient here for a comprehensive physical exam.The patient reports problems - there has been some swelling to the left posterior elbow for the past week. He bumped it several weeks ago but the swelling just occured  Do you take any herbs or supplements that were not prescribed by a doctor? yes Are you taking calcium supplements? no Are you taking aspirin daily? He takes one every other day   History:  He had been seeing a urologist years ago    Review of Systems   Constitutional: Negative for fatigue. HENT: Negative for sinus pressure. Eyes: Negative for visual disturbance. Respiratory: Negative for shortness of breath. Cardiovascular: Negative for chest pain. Gastrointestinal: Negative for constipation. Genitourinary: Negative. Musculoskeletal: Positive for joint swelling. Negative for arthralgias. Skin: Negative for rash. Neurological: Negative for headaches. The patient's medications, allergies, past medical problems, surgical, social, and family histories were reviewed and updated as needed. Physical Exam  Constitutional:       General: He is not in acute distress. Appearance: He is well-developed. HENT:      Head: Normocephalic and atraumatic. Right Ear: External ear normal.      Left Ear: External ear normal.      Nose: Nose normal.      Mouth/Throat:      Pharynx: No oropharyngeal exudate. Eyes:      General: No scleral icterus. Conjunctiva/sclera: Conjunctivae normal.   Neck:      Musculoskeletal: Neck supple. Thyroid: No thyromegaly. Vascular: No carotid bruit. Trachea: No tracheal deviation. Cardiovascular:      Rate and Rhythm: Normal rate and regular rhythm. Heart sounds: Normal heart sounds. Pulmonary:      Effort: Pulmonary effort is normal.      Breath sounds: Normal breath sounds. Abdominal:      General: Bowel sounds are normal.      Palpations: Abdomen is soft. There is no mass. Hernia: There is no hernia in the left inguinal area or right inguinal area. Genitourinary:     Penis: Normal and uncircumcised. Testes: Normal.      Prostate: Normal.      Rectum: Normal. Guaiac stool: no stool. Musculoskeletal:      Comments: There is swelling to the left olecranon area without redness or warmth. Lymphadenopathy:      Cervical: No cervical adenopathy. Skin:     General: Skin is warm and dry. Neurological:      Mental Status: He is alert and oriented to person, place, and time. Psychiatric:         Behavior: Behavior normal.         Blood pressure (!) 140/78, pulse 68, temperature 96.7 °F (35.9 °C), temperature source Skin, resp. rate 14, height 6' 2\" (1.88 m), weight 219 lb 4 oz (99.5 kg).                 An electronic signature was used to authenticate this note.     --Florentino Anderson MD

## 2021-03-04 ENCOUNTER — HOSPITAL ENCOUNTER (OUTPATIENT)
Age: 66
Discharge: HOME | End: 2021-03-04
Payer: COMMERCIAL

## 2021-03-04 ENCOUNTER — HOSPITAL ENCOUNTER (OUTPATIENT)
Dept: GENERAL RADIOLOGY | Age: 66
Discharge: HOME | End: 2021-03-04
Payer: COMMERCIAL

## 2021-03-04 DIAGNOSIS — M70.22 OLECRANON BURSITIS OF LEFT ELBOW: ICD-10-CM

## 2021-03-04 DIAGNOSIS — Z12.5 SCREENING FOR MALIGNANT NEOPLASM OF PROSTATE: ICD-10-CM

## 2021-03-04 LAB — PROSTATE SPECIFIC ANTIGEN: 12.36 NG/ML (ref 0–1)

## 2021-03-04 PROCEDURE — 36415 COLL VENOUS BLD VENIPUNCTURE: CPT

## 2021-03-04 PROCEDURE — 73080 X-RAY EXAM OF ELBOW: CPT

## 2021-03-04 PROCEDURE — G0103 PSA SCREENING: HCPCS

## 2021-03-05 ENCOUNTER — TELEPHONE (OUTPATIENT)
Dept: FAMILY MEDICINE CLINIC | Age: 66
End: 2021-03-05

## 2021-03-05 DIAGNOSIS — R97.20 ELEVATED PSA, BETWEEN 10 AND LESS THAN 20 NG/ML: Primary | ICD-10-CM

## 2021-03-16 ENCOUNTER — OFFICE VISIT (OUTPATIENT)
Dept: CARDIOLOGY CLINIC | Age: 66
End: 2021-03-16
Payer: COMMERCIAL

## 2021-03-16 VITALS
BODY MASS INDEX: 27.46 KG/M2 | SYSTOLIC BLOOD PRESSURE: 142 MMHG | WEIGHT: 214 LBS | HEIGHT: 74 IN | HEART RATE: 64 BPM | DIASTOLIC BLOOD PRESSURE: 80 MMHG

## 2021-03-16 DIAGNOSIS — E78.01 FAMILIAL HYPERCHOLESTEROLEMIA: Primary | ICD-10-CM

## 2021-03-16 DIAGNOSIS — I10 ESSENTIAL HYPERTENSION: ICD-10-CM

## 2021-03-16 DIAGNOSIS — I25.119 CORONARY ARTERY DISEASE WITH ANGINA PECTORIS, UNSPECIFIED VESSEL OR LESION TYPE, UNSPECIFIED WHETHER NATIVE OR TRANSPLANTED HEART (HCC): ICD-10-CM

## 2021-03-16 PROCEDURE — 99214 OFFICE O/P EST MOD 30 MIN: CPT | Performed by: NUCLEAR MEDICINE

## 2021-03-16 NOTE — PROGRESS NOTES
[address] [facility] [address]  [address]  [facility] [place] [address]  Dept: [phone]  Dept Fax: [phone]  Loc: [phone]    Visit Date: 3/16/2021    Julieta Blackwell is a 74 y.o. male who presents todayfor:  Chief Complaint   Patient presents with    Check-Up    Coronary Artery Disease    Hypertension    Hyperlipidemia   known CABG   And stents  Some dyspnea  Gained weight   Some limitation with that   Some dyspnea on exertion   No use of nitro   BP is stable  No dizziness  No syncope  On statins for hyperlipidemia        HPI:  HPI  Past Medical History:   Diagnosis Date    Bleeding tendency ([address]     Patient states \"From medications\"    CAD (coronary artery disease)     Chest pain     COVID-19 12/2020    Fatigue     HLD (hyperlipidemia)     Hypertension     MI (myocardial infarction) ([address]     Snores     SOB (shortness of breath)       Past Surgical History:   Procedure Laterality Date    CARDIAC CATHETERIZATION  4-21-08    Significant vasculopathy w/ severe disease in the coronary arteries, pretty much aneurysmal and diffuse in nature everywhere, involving pretty much every segment of coronary arteries. Critical stenosis of the left circ. which is likely to be culprit vessel. Significant aortic disease as well. Mild LV dysfunction.  CARDIOVASCULAR STRESS TEST  4-01-11    Moderate inferolateral infarct w/o obvious significant stress induced ischemia. EF 58%. Mild wall motion abnormality.  CORONARY ANGIOPLASTY WITH STENT PLACEMENT  6-27-13    CORONARY ANGIOPLASTY WITH STENT PLACEMENT  10/30/2016    3 stent by Dr Carlos Russell  [id number]   [address]      TRANSTHORACIC ECHOCARDIOGRAM  3-31-11    LV size normal. Systolic function mildly reduced. EF 50-55%. Mild diffuse hypokinesis. Wall thickness mildly increased. Mild concentric LVH.     TRANSTHORACIC ECHOCARDIOGRAM 08    Global LV dysfunction. EF 40%. Calcified AV w/o obvious stenosis. Mild MR and TR. Dilated ascending aorta. Family History   Problem Relation Age of Onset    Hypertension Father     Stroke Father     Heart Disease Mother     Kidney Disease Mother      Social History     Tobacco Use    Smoking status: Former Smoker     Packs/day: 2.00     Years: 39.00     Pack years: 78.00     Types: Cigarettes     Quit date: 2008     Years since quittin.9    Smokeless tobacco: Never Used   Substance Use Topics    Alcohol use: No     Alcohol/week: 0.0 standard drinks      Current Outpatient Medications   Medication Sig Dispense Refill    ZINC PO Take 50 mg by mouth daily      Cholecalciferol (VITAMIN D3 PO) Take 5,000 Units by mouth daily      Famotidine (PEPCID PO) Take by mouth daily      tamsulosin (FLOMAX) 0.4 MG capsule Take 1 capsule by mouth nightly 90 capsule 3    atorvastatin (LIPITOR) 40 MG tablet Take 1 tablet by mouth every other day 45 tablet 3    lisinopril (PRINIVIL;ZESTRIL) 10 MG tablet Take 1 tablet by mouth 2 times daily 180 tablet 3    metoprolol tartrate (LOPRESSOR) 25 MG tablet TAKE ONE TABLET BY MOUTH TWICE A  tablet 3    fluticasone (FLONASE) 50 MCG/ACT nasal spray 1 spray by Each Nostril route daily 1 Bottle 3    prasugrel (EFFIENT) 10 MG TABS TAKE ONE TABLET BY MOUTH ONE TIME A DAY 90 tablet 3    NONFORMULARY Biotene mouth spray nightly      Coenzyme Q10 (CO Q 10 PO) Take 200 mg by mouth      CPAP Machine MISC by Does not apply route Please change CPAP pressure to 10 cm H20. 1 each 0    therapeutic multivitamin-minerals (THERAGRAN-M) tablet Take 1 tablet by mouth daily.  aspirin 81 MG EC tablet Take 81 mg by mouth every other day        No current facility-administered medications for this visit.       Allergies   Allergen Reactions    Codeine     Darvon [Propoxyphene Hcl]      Not sure of reaction     Health Maintenance   Topic Date Due    COVID-19 Vaccine (1) Never done    DTaP/Tdap/Td vaccine (1 - Tdap) Never done    Pneumococcal 65+ years Vaccine (1 of 1 - PPSV23) Never done   [employer] Visit (AWV)  Never done    Lipid screen  05/06/2020    Flu vaccine (1) 03/01/2022 (Originally 9/1/2020)    Shingles Vaccine (1 of 2) 03/01/2022 (Originally 1/15/2003)    Hepatitis C screen  03/01/2022 (Originally 1955)    Potassium monitoring  12/10/2021    Creatinine monitoring  12/10/2021    PSA counseling  03/04/2022    Colon cancer screen colonoscopy  05/27/2026    AAA screen  Completed    Hepatitis A vaccine  Aged Out    Hepatitis B vaccine  Aged Out    Hib vaccine  Aged Out    Meningococcal (ACWY) vaccine  Aged Out    Low dose CT lung screening  Discontinued       Subjective:  Review of Systems  General:   No fever, no chills, some fatigue or weight loss  Pulmonary:    some dyspnea, no wheezing  Cardiac:    Denies recent chest pain,   GI:     No nausea or vomiting, no abdominal pain  Neuro:    No dizziness or light headedness,   Musculoskeletal:  No recent active issues  Extremities:   No edema, no obvious claudication       Objective:  Physical Exam  BP (!) 142/80   Pulse 64   Ht 6' 2\" (1.88 m)   Wt 214 lb (97.1 kg)   BMI 27.48 kg/m²   General:   Well developed, well nourished  Lungs:   Clear to auscultation  Heart:    Normal S1 S2, Slight murmur. no rubs, no gallops  Abdomen:   Soft, non tender, no organomegalies, positive bowel sounds  Extremities:   No edema, no cyanosis, good peripheral pulses  Neurological:   Awake, alert, oriented. No obvious focal deficits  Musculoskelatal:  No obvious deformities    Assessment:      Diagnosis Orders   1. Familial hypercholesterolemia     2. Coronary artery disease with angina pectoris, unspecified vessel or lesion type, unspecified whether native or transplanted heart ([facility] [address]     3.  Essential hypertension     as above  Risk for CAD  Symptoms as above   Borderline symptoms      Plan:  No

## 2021-04-01 ENCOUNTER — OFFICE VISIT (OUTPATIENT)
Dept: UROLOGY | Age: 66
End: 2021-04-01
Payer: COMMERCIAL

## 2021-04-01 ENCOUNTER — TELEPHONE (OUTPATIENT)
Dept: UROLOGY | Age: 66
End: 2021-04-01

## 2021-04-01 VITALS — HEIGHT: 74 IN | TEMPERATURE: 97.3 F | BODY MASS INDEX: 27.21 KG/M2 | WEIGHT: 212 LBS

## 2021-04-01 DIAGNOSIS — N13.8 BPH WITH OBSTRUCTION/LOWER URINARY TRACT SYMPTOMS: ICD-10-CM

## 2021-04-01 DIAGNOSIS — R35.1 NOCTURIA: ICD-10-CM

## 2021-04-01 DIAGNOSIS — R97.20 ELEVATED PSA: Primary | ICD-10-CM

## 2021-04-01 DIAGNOSIS — N40.1 BPH WITH OBSTRUCTION/LOWER URINARY TRACT SYMPTOMS: ICD-10-CM

## 2021-04-01 PROCEDURE — 99204 OFFICE O/P NEW MOD 45 MIN: CPT | Performed by: UROLOGY

## 2021-04-01 NOTE — PROGRESS NOTES
Lilly Kidd MD   Urology Clinic office Visit      Patient:  Francisco Howard  YOB: 1955  Date: 4/1/2021    HISTORY OF PRESENT ILLNESS:   The patient is a 74 y.o. male who presents today for evaluation of the following problem(s):      1. Elevated PSA    2. BPH with obstruction/lower urinary tract symptoms    3. Nocturia           Overall the problem(s) : are worsening. Associated Symptoms: No dysuria, gross hematuria. Pain Severity:      Summary of old records: seen urologist in past for BPH  (Patient's old records, notes and chart reviewed and summarized above.)      Onset years  BPH on flomax  Severity is described as moderate. The course of symptoms over time is chronic and worsening. Alleviating factors: none  Worsening factors: none  Lower urinary tract symptoms: decreased urinary stream, post void dribbling, noc2x    No fhx of prostate cancer    I independently reviewed and verified the images and reports from:    Xr Elbow Left (min 3 Views)    Result Date: 3/4/2021  PROCEDURE: XR ELBOW LEFT (MIN 3 VIEWS) CLINICAL INFORMATION: Olecranon bursitis of left elbow. COMPARISON: No prior study. TECHNIQUE: 4 views of the right elbow. FINDINGS: Fractures-None. Joint spaces-No subluxation or dislocation. Mild osteophyte at the medial and lateral epicondyle. Degenerative changes-No erosions. Effusions-None. Soft tissues-this shows prominent thickening and increased density posterior to the olecranon. Medial soft tissue swelling and edema of the elbow. Posterior elbow prominent soft tissue swelling with increased density is consistent with history of olecranon bursitis. If there is need for greater detail considered MRI of the elbow. No fracture or dislocation. Medial and lateral epicondylar osteophyte. . **This report has been created using voice recognition software. It may contain minor errors which are inherent in voice recognition technology. ** Final report electronically signed by  [facility].  TRANSTHORACIC ECHOCARDIOGRAM  08    Global LV dysfunction. EF 40%. Calcified AV w/o obvious stenosis. Mild MR and TR. Dilated ascending aorta. Family History   Problem Relation Age of Onset    Hypertension Father     Stroke Father     Heart Disease Mother     Kidney Disease Mother      Outpatient Medications Marked as Taking for the 21 encounter (Office Visit) with Bi Moseley MD   Medication Sig Dispense Refill    ZINC PO Take 50 mg by mouth daily      Cholecalciferol (VITAMIN D3 PO) Take 5,000 Units by mouth daily      Famotidine (PEPCID PO) Take by mouth daily      tamsulosin (FLOMAX) 0.4 MG capsule Take 1 capsule by mouth nightly 90 capsule 3    atorvastatin (LIPITOR) 40 MG tablet Take 1 tablet by mouth every other day 45 tablet 3    lisinopril (PRINIVIL;ZESTRIL) 10 MG tablet Take 1 tablet by mouth 2 times daily 180 tablet 3    metoprolol tartrate (LOPRESSOR) 25 MG tablet TAKE ONE TABLET BY MOUTH TWICE A  tablet 3    fluticasone (FLONASE) 50 MCG/ACT nasal spray 1 spray by Each Nostril route daily 1 Bottle 3    prasugrel (EFFIENT) 10 MG TABS TAKE ONE TABLET BY MOUTH ONE TIME A DAY 90 tablet 3    NONFORMULARY Biotene mouth spray nightly      Coenzyme Q10 (CO Q 10 PO) Take 200 mg by mouth      CPAP Machine MISC by Does not apply route Please change CPAP pressure to 10 cm H20. 1 each 0    therapeutic multivitamin-minerals (THERAGRAN-M) tablet Take 1 tablet by mouth daily.         aspirin 81 MG EC tablet Take 81 mg by mouth every other day          Codeine and Darvon [propoxyphene hcl]  Social History     Tobacco Use   Smoking Status Former Smoker    Packs/day: 2.00    Years: 39.00    Pack years: 78.00    Types: Cigarettes    Quit date: 2008    Years since quittin.9   Smokeless Tobacco Never Used       Social History     Substance and Sexual Activity   Alcohol Use No    Alcohol/week: 0.0 standard drinks       REVIEW OF SYSTEMS:  Constitutional: negative  Eyes: negative  Respiratory: negative  Cardiovascular: negative  Gastrointestinal: negative  Musculoskeletal: negative  Genitourinary: negative except for what is in HPI  Skin: negative   Neurological: negative  Hematological/Lymphatic: negative  Psychological: negative    Physical Exam:    This a 74 y.o. male   Vitals:    04/01/21 0919   Temp: 97.3 °F (36.3 °C)     Constitutional: Patient in no acute distress; Neuro: alert and oriented to person place and time. Psych: Mood and affect normal.  Skin: Normal  Lungs: Respiratory effort normal  Cardiovascular:  Normal peripheral pulses  Abdomen: Soft, non-tender, non-distended   Bladder non-tender and not distended. Lymphatics: no palpable lymphadenopathy  Gait is within normal limits  Musculoskeletal: Normal range of motion       Assessment and Plan      1. Elevated PSA    2. BPH with obstruction/lower urinary tract symptoms    3. Nocturia         Flomax for BPH    Elevated PSA: Discussed implications of elevated PSA. Discussed repeating again or proceeding to biopsy or MRI. Discussed risks of both. Discussed false positive and false negative risk of the above. Elevated PSA: Discussed implications of elevated PSA. Discussed things like infection, trauma, surgery, benign enlargement or cancer--can all alter PSA. Will repeat PSA and %free PSA. Discussed avoiding sexual activity and bike riding 1-2 weeks prior to recheck. ExoDx today        Prescriptions Ordered:  No orders of the defined types were placed in this encounter.      Orders Placed:  Orders Placed This Encounter   Procedures    PSA, free     Standing Status:   Future     Standing Expiration Date:   4/1/2022            MD Gee Chappell M.D, MD  [facility]

## 2021-04-26 ENCOUNTER — HOSPITAL ENCOUNTER (OUTPATIENT)
Age: 66
Discharge: HOME | End: 2021-04-26
Payer: COMMERCIAL

## 2021-04-26 DIAGNOSIS — R97.20 ELEVATED PSA: ICD-10-CM

## 2021-04-26 PROCEDURE — 84153 ASSAY OF PSA TOTAL: CPT

## 2021-04-26 PROCEDURE — 84154 ASSAY OF PSA FREE: CPT

## 2021-04-26 PROCEDURE — 36415 COLL VENOUS BLD VENIPUNCTURE: CPT

## 2021-04-28 LAB — PROSTATE SPECIFIC ANTIGEN FREE: NORMAL

## 2021-09-10 LAB
CHOLESTEROL, TOTAL: 152 MG/DL (ref 0–199)
FASTING: YES
GLUCOSE BLD-MCNC: 108 MG/DL (ref 74–109)
HDLC SERPL-MCNC: 50 MG/DL (ref 40–90)
LDL CHOLESTEROL CALCULATED: 89 MG/DL
TRIGL SERPL-MCNC: 64 MG/DL (ref 0–199)

## 2021-09-25 ENCOUNTER — PATIENT MESSAGE (OUTPATIENT)
Dept: FAMILY MEDICINE CLINIC | Age: 66
End: 2021-09-25

## 2021-09-25 DIAGNOSIS — Z20.822 EXPOSURE TO COVID-19 VIRUS: Primary | ICD-10-CM

## 2021-11-02 ENCOUNTER — OFFICE VISIT (OUTPATIENT)
Dept: PULMONOLOGY | Age: 66
End: 2021-11-02
Payer: COMMERCIAL

## 2021-11-02 VITALS
TEMPERATURE: 97.8 F | HEIGHT: 74 IN | SYSTOLIC BLOOD PRESSURE: 138 MMHG | HEART RATE: 82 BPM | BODY MASS INDEX: 27.23 KG/M2 | DIASTOLIC BLOOD PRESSURE: 82 MMHG | OXYGEN SATURATION: 98 % | WEIGHT: 212.2 LBS

## 2021-11-02 DIAGNOSIS — G47.33 OBSTRUCTIVE SLEEP APNEA: Primary | ICD-10-CM

## 2021-11-02 PROCEDURE — 99213 OFFICE O/P EST LOW 20 MIN: CPT | Performed by: PHYSICIAN ASSISTANT

## 2021-11-02 ASSESSMENT — ENCOUNTER SYMPTOMS
BACK PAIN: 0
ALLERGIC/IMMUNOLOGIC NEGATIVE: 1
CHEST TIGHTNESS: 0
WHEEZING: 0
SHORTNESS OF BREATH: 0
STRIDOR: 0
NAUSEA: 0
COUGH: 0
EYES NEGATIVE: 1
DIARRHEA: 0

## 2021-11-02 NOTE — PROGRESS NOTES
Negative. Allergic/Immunologic: Negative. Neurological: Negative. Negative for dizziness and light-headedness. Psychiatric/Behavioral: Negative. All other systems reviewed and are negative. Physical Exam:    BMI:  Body mass index is 27.24 kg/m². Wt Readings from Last 3 Encounters:   11/02/21 212 lb 3.2 oz (96.3 kg)   04/01/21 212 lb (96.2 kg)   03/16/21 214 lb (97.1 kg)     Weight stable / unchanged  Vitals: /82 (Site: Left Upper Arm, Position: Sitting, Cuff Size: Large Adult)   Pulse 82   Temp 97.8 °F (36.6 °C) (Temporal)   Ht 6' 2\" (1.88 m)   Wt 212 lb 3.2 oz (96.3 kg)   SpO2 98%   BMI 27.24 kg/m²       Physical Exam  Constitutional:       Appearance: He is well-developed. HENT:      Head: Normocephalic and atraumatic. Right Ear: External ear normal.      Left Ear: External ear normal.   Eyes:      Conjunctiva/sclera: Conjunctivae normal.      Pupils: Pupils are equal, round, and reactive to light. Cardiovascular:      Rate and Rhythm: Normal rate and regular rhythm. Heart sounds: Normal heart sounds. Pulmonary:      Effort: Pulmonary effort is normal.      Breath sounds: Normal breath sounds. Musculoskeletal:         General: Normal range of motion. Cervical back: Normal range of motion and neck supple. Skin:     General: Skin is warm and dry. Neurological:      Mental Status: He is alert and oriented to person, place, and time. Psychiatric:         Behavior: Behavior normal.         Thought Content: Thought content normal.         Judgment: Judgment normal.           ASSESSMENT/DIAGNOSIS     Diagnosis Orders   1. Obstructive sleep apnea              Plan   Do you need any equipment today? Yes update supplies  - Download reviewed and discussed with patient  - He  was advised to continue current positive airway pressure therapy with above described pressure.    - He  advised to keep good compliance with current recommended pressure to get optimal results and clinical improvement  - Recommend 7-9 hours of sleep with PAP  - He was advised to call [employer] company regarding supplies if needed.   -He call my office for earlier appointment if needed for worsening of sleep symptoms.   - He was instructed on weight loss  - Flori Manriquez was educated about my impression and plan. Patient verbalizesunderstanding.   We will see Mera Phan back in: 1 year with download    Information added by my medical assistant/LPN was reviewed today         Magdalena Bird PA-C, MPAS  11/2/2021

## 2022-01-28 ENCOUNTER — OFFICE VISIT (OUTPATIENT)
Dept: FAMILY MEDICINE CLINIC | Age: 67
End: 2022-01-28

## 2022-01-28 VITALS
HEIGHT: 74 IN | DIASTOLIC BLOOD PRESSURE: 80 MMHG | BODY MASS INDEX: 27.53 KG/M2 | HEART RATE: 70 BPM | WEIGHT: 214.5 LBS | TEMPERATURE: 96.5 F | SYSTOLIC BLOOD PRESSURE: 130 MMHG | RESPIRATION RATE: 14 BRPM

## 2022-01-28 DIAGNOSIS — N40.1 BENIGN PROSTATIC HYPERPLASIA WITH WEAK URINARY STREAM: ICD-10-CM

## 2022-01-28 DIAGNOSIS — I25.119 CORONARY ARTERY DISEASE WITH ANGINA PECTORIS, UNSPECIFIED VESSEL OR LESION TYPE, UNSPECIFIED WHETHER NATIVE OR TRANSPLANTED HEART (HCC): ICD-10-CM

## 2022-01-28 DIAGNOSIS — R39.12 BENIGN PROSTATIC HYPERPLASIA WITH WEAK URINARY STREAM: ICD-10-CM

## 2022-01-28 DIAGNOSIS — R97.20 ELEVATED PSA, BETWEEN 10 AND LESS THAN 20 NG/ML: ICD-10-CM

## 2022-01-28 DIAGNOSIS — J31.0 CHRONIC RHINITIS: ICD-10-CM

## 2022-01-28 DIAGNOSIS — H61.23 BILATERAL IMPACTED CERUMEN: ICD-10-CM

## 2022-01-28 DIAGNOSIS — J43.9 PULMONARY EMPHYSEMA, UNSPECIFIED EMPHYSEMA TYPE (HCC): Primary | ICD-10-CM

## 2022-01-28 DIAGNOSIS — I10 ESSENTIAL HYPERTENSION: ICD-10-CM

## 2022-01-28 PROCEDURE — 99214 OFFICE O/P EST MOD 30 MIN: CPT | Performed by: FAMILY MEDICINE

## 2022-01-28 PROCEDURE — 69210 REMOVE IMPACTED EAR WAX UNI: CPT | Performed by: FAMILY MEDICINE

## 2022-01-28 RX ORDER — ATORVASTATIN CALCIUM 40 MG/1
40 TABLET, FILM COATED ORAL EVERY OTHER DAY
Qty: 45 TABLET | Refills: 3 | Status: SHIPPED | OUTPATIENT
Start: 2022-01-28

## 2022-01-28 RX ORDER — PRASUGREL 10 MG/1
TABLET, FILM COATED ORAL
Qty: 90 TABLET | Refills: 3 | Status: SHIPPED | OUTPATIENT
Start: 2022-01-28

## 2022-01-28 RX ORDER — FLUTICASONE PROPIONATE 50 MCG
1 SPRAY, SUSPENSION (ML) NASAL DAILY
Qty: 3 EACH | Refills: 5 | Status: SHIPPED | OUTPATIENT
Start: 2022-01-28

## 2022-01-28 RX ORDER — LISINOPRIL 10 MG/1
10 TABLET ORAL 2 TIMES DAILY
Qty: 180 TABLET | Refills: 3 | Status: SHIPPED | OUTPATIENT
Start: 2022-01-28

## 2022-01-28 RX ORDER — TAMSULOSIN HYDROCHLORIDE 0.4 MG/1
0.4 CAPSULE ORAL NIGHTLY
Qty: 90 CAPSULE | Refills: 3 | Status: SHIPPED | OUTPATIENT
Start: 2022-01-28 | End: 2022-03-16

## 2022-01-28 ASSESSMENT — ENCOUNTER SYMPTOMS
CONSTIPATION: 0
SINUS PRESSURE: 0
SHORTNESS OF BREATH: 0

## 2022-01-28 NOTE — PATIENT INSTRUCTIONS
1. He uses the flomax at times due to the voiding being being better at voiding  2.  The breathing is doing well

## 2022-01-28 NOTE — PROGRESS NOTES
Betsey Del Castillo (:  1955) is a 69 y.o. male,Established patient, here for evaluation of the following chief complaint(s):  Hypertension         ASSESSMENT/PLAN:       Diagnosis Orders   1. Pulmonary emphysema, unspecified emphysema type ([address]     2. Benign prostatic hyperplasia with weak urinary stream  tamsulosin (FLOMAX) 0.4 MG capsule   3. Coronary artery disease with angina pectoris, unspecified vessel or lesion type, unspecified whether native or transplanted heart (HCC)  atorvastatin (LIPITOR) 40 MG tablet    lisinopril (PRINIVIL;ZESTRIL) 10 MG tablet    metoprolol tartrate (LOPRESSOR) 25 MG tablet    prasugrel (EFFIENT) 10 MG TABS   4. Essential hypertension  atorvastatin (LIPITOR) 40 MG tablet    lisinopril (PRINIVIL;ZESTRIL) 10 MG tablet    metoprolol tartrate (LOPRESSOR) 25 MG tablet   5. Chronic rhinitis  fluticasone (FLONASE) 50 MCG/ACT nasal spray   6. Elevated PSA, between 10 and less than 20 ng/ml  PSA screening   7. Bilateral impacted cerumen  04480 - OR REMOVE IMPACTED EAR WAX     Do the fasting labs after 3/7/2022  See me in a year      Subjective   SUBJECTIVE/OBJECTIVE:  HPI  1. He uses the flomax at times due to the voiding being being better at voiding  2. The breathing is doing well  Review of Systems   Constitutional: Negative for fatigue. HENT: Negative for sinus pressure. Eyes: Negative for visual disturbance. Respiratory: Negative for shortness of breath. Cardiovascular: Negative for chest pain. Gastrointestinal: Negative for constipation. Genitourinary: Negative. Musculoskeletal: Negative for arthralgias. Skin: Negative for rash. Neurological: Negative for headaches. The patient's medications, allergies, past medical problems, surgical, social, and family histories were reviewed and updated as needed. Objective   Physical Exam  Constitutional:       General: He is not in acute distress. Appearance: He is well-developed.    HENT:      Head: Normocephalic and atraumatic. Right Ear: External ear normal. There is impacted cerumen. Left Ear: External ear normal. There is impacted cerumen. Ears:      Comments: Wax was removed from the affected ear(s) with a combination of the nurse using the water bottle, and myself using the curette. Inspection of the canal, and the tympanic membrane by myself afterward found no sign of injury. Nose: Nose normal.      Mouth/Throat:      Pharynx: No oropharyngeal exudate. Eyes:      General: No scleral icterus. Conjunctiva/sclera: Conjunctivae normal.   Neck:      Thyroid: No thyromegaly. Vascular: No carotid bruit. Trachea: No tracheal deviation. Cardiovascular:      Rate and Rhythm: Normal rate and regular rhythm. Heart sounds: Normal heart sounds. Pulmonary:      Effort: Pulmonary effort is normal.      Breath sounds: Normal breath sounds. Abdominal:      General: Bowel sounds are normal.      Palpations: Abdomen is soft. There is no mass. Hernia: There is no hernia in the left inguinal area or right inguinal area. Genitourinary:     Penis: Normal.       Testes: Normal.      Prostate: Normal.      Rectum: Normal.   Musculoskeletal:      Cervical back: Neck supple. Lymphadenopathy:      Cervical: No cervical adenopathy. Skin:     General: Skin is warm and dry. Neurological:      Mental Status: He is alert and oriented to person, place, and time. Psychiatric:         Behavior: Behavior normal.     Blood pressure 130/80, pulse 70, temperature 96.5 °F (35.8 °C), temperature source Skin, resp. rate 14, height 6' 2\" (1.88 m), weight 214 lb 8 oz (97.3 kg). An electronic signature was used to authenticate this note.     --Elaine Kidd MD

## 2022-03-16 ENCOUNTER — OFFICE VISIT (OUTPATIENT)
Dept: CARDIOLOGY CLINIC | Age: 67
End: 2022-03-16
Payer: COMMERCIAL

## 2022-03-16 ENCOUNTER — TELEPHONE (OUTPATIENT)
Dept: CARDIOLOGY CLINIC | Age: 67
End: 2022-03-16

## 2022-03-16 VITALS
BODY MASS INDEX: 27.72 KG/M2 | WEIGHT: 216 LBS | SYSTOLIC BLOOD PRESSURE: 140 MMHG | HEART RATE: 63 BPM | HEIGHT: 74 IN | DIASTOLIC BLOOD PRESSURE: 88 MMHG

## 2022-03-16 DIAGNOSIS — I25.119 CORONARY ARTERY DISEASE INVOLVING NATIVE CORONARY ARTERY OF NATIVE HEART WITH ANGINA PECTORIS (HCC): Primary | ICD-10-CM

## 2022-03-16 DIAGNOSIS — I10 PRIMARY HYPERTENSION: ICD-10-CM

## 2022-03-16 DIAGNOSIS — R06.02 SHORTNESS OF BREATH: ICD-10-CM

## 2022-03-16 DIAGNOSIS — E78.01 FAMILIAL HYPERCHOLESTEROLEMIA: ICD-10-CM

## 2022-03-16 PROCEDURE — 99214 OFFICE O/P EST MOD 30 MIN: CPT | Performed by: NUCLEAR MEDICINE

## 2022-03-16 PROCEDURE — 93000 ELECTROCARDIOGRAM COMPLETE: CPT | Performed by: NUCLEAR MEDICINE

## 2022-03-16 RX ORDER — NITROGLYCERIN 0.4 MG/1
0.4 TABLET SUBLINGUAL EVERY 5 MIN PRN
Qty: 25 TABLET | Refills: 3 | Status: SHIPPED | OUTPATIENT
Start: 2022-03-16

## 2022-03-16 NOTE — PROGRESS NOTES
[address] [facility]  [address]  [facility] [address]  Dept: [phone]  Dept Fax: [phone]  Loc: [phone]    Visit Date: 3/16/2022    Ar Abrams is a 69 y.o. male who presents todayfor:  Chief Complaint   Patient presents with    Check-Up    Coronary Artery Disease    Hypertension    Shortness of Breath    Hyperlipidemia   known CABG and stents  Has had more dyspnea  More than usual   More fatigue   No chest pain   No use of nitro   Does have more tiredness  Bp is stable   No dizziness  No syncope  On statins for hyperlipidemia  No arrhythmia         HPI:  HPI  Past Medical History:   Diagnosis Date    Bleeding tendency ([address]     Patient states \"From medications\"    CAD (coronary artery disease)     Chest pain     COVID-19 12/2020    Fatigue     HLD (hyperlipidemia)     Hypertension     MI (myocardial infarction) ([address]     Snores     SOB (shortness of breath)       Past Surgical History:   Procedure Laterality Date    CARDIAC CATHETERIZATION  4-21-08    Significant vasculopathy w/ severe disease in the coronary arteries, pretty much aneurysmal and diffuse in nature everywhere, involving pretty much every segment of coronary arteries. Critical stenosis of the left circ. which is likely to be culprit vessel. Significant aortic disease as well. Mild LV dysfunction.  CARDIOVASCULAR STRESS TEST  4-01-11    Moderate inferolateral infarct w/o obvious significant stress induced ischemia. EF 58%. Mild wall motion abnormality.  CORONARY ANGIOPLASTY WITH STENT PLACEMENT  6-27-13    CORONARY ANGIOPLASTY WITH STENT PLACEMENT  10/30/2016    3 stent by Dr Richard Lin  [id number]   [facility]      TRANSTHORACIC ECHOCARDIOGRAM  3-31-11    LV size normal. Systolic function mildly reduced. EF 50-55%. Mild diffuse hypokinesis. Wall thickness mildly increased.  Mild concentric LVH.  TRANSTHORACIC ECHOCARDIOGRAM  08    Global LV dysfunction. EF 40%. Calcified AV w/o obvious stenosis. Mild MR and TR. Dilated ascending aorta. Family History   Problem Relation Age of Onset    Hypertension Father     Stroke Father     Heart Disease Mother     Kidney Disease Mother      Social History     Tobacco Use    Smoking status: Former Smoker     Packs/day: 2.00     Years: 39.00     Pack years: 78.00     Types: Cigarettes     Quit date: 2008     Years since quittin.9    Smokeless tobacco: Never Used   Substance Use Topics    Alcohol use: No     Alcohol/week: 0.0 standard drinks      Current Outpatient Medications   Medication Sig Dispense Refill    atorvastatin (LIPITOR) 40 MG tablet Take 1 tablet by mouth every other day 45 tablet 3    lisinopril (PRINIVIL;ZESTRIL) 10 MG tablet Take 1 tablet by mouth 2 times daily 180 tablet 3    metoprolol tartrate (LOPRESSOR) 25 MG tablet TAKE ONE TABLET BY MOUTH TWICE A  tablet 3    prasugrel (EFFIENT) 10 MG TABS TAKE ONE TABLET BY MOUTH ONE TIME A DAY 90 tablet 3    fluticasone (FLONASE) 50 MCG/ACT nasal spray 1 spray by Each Nostril route daily 3 each 5    NONFORMULARY Biotene mouth spray nightly      Coenzyme Q10 (CO Q 10 PO) Take 200 mg by mouth      CPAP Machine MISC by Does not apply route Please change CPAP pressure to 10 cm H20. 1 each 0    therapeutic multivitamin-minerals (THERAGRAN-M) tablet Take 1 tablet by mouth daily.  aspirin 81 MG EC tablet Take 81 mg by mouth every other day        No current facility-administered medications for this visit.      Allergies   Allergen Reactions    Codeine     Darvon [Propoxyphene Hcl]      Not sure of reaction     Health Maintenance   Topic Date Due    Hepatitis C screen  Never done    Depression Screen  Never done    DTaP/Tdap/Td vaccine (1 - Tdap) Never done    Diabetes screen  Never done    Shingles Vaccine (1 of 2) Never done    Pneumococcal 65+ years Vaccine (1 of 1 - PPSV23) Never done    AAA screen  01/15/2018    Annual Wellness Visit (AWV)  Never done    Flu vaccine (1) 09/01/2021    Potassium monitoring  12/10/2021    Creatinine monitoring  12/10/2021    COVID-19 Vaccine (1) 06/07/2022 (Originally 1/15/1960)    PSA counseling  04/26/2022    Lipid screen  09/10/2022    Colorectal Cancer Screen  05/27/2026    Hepatitis A vaccine  Aged Out    Hepatitis B vaccine  Aged Out    Hib vaccine  Aged Out    Meningococcal (ACWY) vaccine  Aged Out    Low dose CT lung screening  Discontinued       Subjective:  Review of Systems  General:   No fever, no chills, some fatigue or weight loss  Pulmonary:    some dyspnea, no wheezing  Cardiac:    Denies recent chest pain,   GI:     No nausea or vomiting, no abdominal pain  Neuro:     No dizziness or light headedness,   Musculoskeletal:  No recent active issues  Extremities:   No edema, no obvious claudication       Objective:  Physical Exam  BP (!) 140/88   Pulse 63   Ht 6' 2\" (1.88 m)   Wt 216 lb (98 kg)   BMI 27.73 kg/m²   General:   Well developed, well nourished  Lungs:    Clear to auscultation  Heart:    Normal S1 S2, Slight murmur. no rubs, no gallops  Abdomen:   Soft, non tender, no organomegalies, positive bowel sounds  Extremities:   No edema, no cyanosis, good peripheral pulses  Neurological:   Awake, alert, oriented. No obvious focal deficits  Musculoskelatal:  No obvious deformities    Assessment:      Diagnosis Orders   1. Coronary artery disease involving native coronary artery of native heart with angina pectoris ([address]  EKG 12 lead   2. Shortness of breath     3. Primary hypertension     4. Familial hypercholesterolemia     as above  Higher risk patient   symptoms as above  ECG in office was done today. I reviewed the ECG. No acute findings      Plan:  No follow-ups on file.   As above  Non invasive cardiac testing will be ordered to further evaluate for any ischemic or structural heart disease as a cause of the patient symptoms. We will proceed with a Stress Cardiolite test and echo soon. Cant walk on a treadmill due to arthritis  Discussed ED  viagra   Discussed side effects at length   No nitro use with viagra   Continue risk factor modification and medical management  Thank you for allowing me to participate in the care of your patient. Please don't hesitate to contact me regarding any further issues related to the patient care    Orders Placed:  Orders Placed This Encounter   Procedures    EKG 12 lead     Order Specific Question:   Reason for Exam?     Answer: Other       Medications Prescribed:  No orders of the defined types were placed in this encounter. Discussed use, benefit, and side effects of prescribed medications. All patient questions answered. Pt voicedunderstanding. Instructed to continue current medications, diet and exercise. Continue risk factor modification and medical management. Patient agreed with treatment plan. Follow up as directed.     Electronically signedby Renea Worley MD on 3/16/2022 at 10:04 AM

## 2022-03-25 ENCOUNTER — HOSPITAL ENCOUNTER (OUTPATIENT)
Dept: NON INVASIVE DIAGNOSTICS | Age: 67
Discharge: HOME | End: 2022-03-25
Payer: COMMERCIAL

## 2022-03-25 VITALS — HEIGHT: 74 IN | BODY MASS INDEX: 27.72 KG/M2 | WEIGHT: 216 LBS

## 2022-03-25 DIAGNOSIS — I25.119 CORONARY ARTERY DISEASE INVOLVING NATIVE CORONARY ARTERY OF NATIVE HEART WITH ANGINA PECTORIS (HCC): ICD-10-CM

## 2022-03-25 DIAGNOSIS — I10 PRIMARY HYPERTENSION: ICD-10-CM

## 2022-03-25 DIAGNOSIS — R06.02 SHORTNESS OF BREATH: ICD-10-CM

## 2022-03-25 DIAGNOSIS — E78.01 FAMILIAL HYPERCHOLESTEROLEMIA: ICD-10-CM

## 2022-03-25 LAB
LV EF: 55 %
LVEF MODALITY: NORMAL

## 2022-03-25 PROCEDURE — 6360000002 HC RX W HCPCS

## 2022-03-25 PROCEDURE — 78452 HT MUSCLE IMAGE SPECT MULT: CPT

## 2022-03-25 PROCEDURE — 93017 CV STRESS TEST TRACING ONLY: CPT | Performed by: NUCLEAR MEDICINE

## 2022-03-25 PROCEDURE — A9500 TC99M SESTAMIBI: HCPCS | Performed by: NUCLEAR MEDICINE

## 2022-03-25 PROCEDURE — 93306 TTE W/DOPPLER COMPLETE: CPT

## 2022-03-25 PROCEDURE — 3430000000 HC RX DIAGNOSTIC RADIOPHARMACEUTICAL: Performed by: NUCLEAR MEDICINE

## 2022-03-25 RX ADMIN — Medication 34.2 MILLICURIE: at 12:44

## 2022-03-25 RX ADMIN — Medication 10 MILLICURIE: at 11:29

## 2022-03-28 ENCOUNTER — TELEPHONE (OUTPATIENT)
Dept: CARDIOLOGY CLINIC | Age: 67
End: 2022-03-28

## 2022-06-09 ENCOUNTER — HOSPITAL ENCOUNTER (EMERGENCY)
Age: 67
Discharge: HOME | End: 2022-06-09
Payer: COMMERCIAL

## 2022-06-09 ENCOUNTER — APPOINTMENT (OUTPATIENT)
Dept: GENERAL RADIOLOGY | Age: 67
End: 2022-06-09
Payer: COMMERCIAL

## 2022-06-09 VITALS
TEMPERATURE: 98.3 F | RESPIRATION RATE: 16 BRPM | HEART RATE: 78 BPM | DIASTOLIC BLOOD PRESSURE: 77 MMHG | OXYGEN SATURATION: 98 % | SYSTOLIC BLOOD PRESSURE: 134 MMHG

## 2022-06-09 DIAGNOSIS — S62.102A AVULSION FRACTURE OF LEFT WRIST: Primary | ICD-10-CM

## 2022-06-09 PROCEDURE — 99213 OFFICE O/P EST LOW 20 MIN: CPT

## 2022-06-09 PROCEDURE — L3809 WHFO W/O JOINTS PRE OTS: HCPCS

## 2022-06-09 PROCEDURE — 73130 X-RAY EXAM OF HAND: CPT

## 2022-06-09 PROCEDURE — 99214 OFFICE O/P EST MOD 30 MIN: CPT | Performed by: NURSE PRACTITIONER

## 2022-06-09 PROCEDURE — 73110 X-RAY EXAM OF WRIST: CPT

## 2022-06-09 ASSESSMENT — ENCOUNTER SYMPTOMS
SHORTNESS OF BREATH: 0
NAUSEA: 0

## 2022-06-09 ASSESSMENT — PAIN DESCRIPTION - LOCATION: LOCATION: HAND;WRIST

## 2022-06-09 ASSESSMENT — PAIN SCALES - GENERAL: PAINLEVEL_OUTOF10: 7

## 2022-06-09 ASSESSMENT — PAIN DESCRIPTION - ORIENTATION: ORIENTATION: LEFT

## 2022-06-09 ASSESSMENT — PAIN - FUNCTIONAL ASSESSMENT: PAIN_FUNCTIONAL_ASSESSMENT: 0-10

## 2022-06-09 NOTE — ED TRIAGE NOTES
Lima Leon arrives to room with complaint of left hand left wrist injury symptoms started today about 3pm.

## 2022-06-09 NOTE — ED PROVIDER NOTES
[id number]       Chief Complaint   Patient presents with    Wrist Injury    Hand Injury       Nurses Notes reviewed and I agree except as noted in the HPI. HISTORY OF PRESENT ILLNESS   Bharath Ghosh is a 69 y.o. male who presents for evaluation of left wrist and hand injury. Injury earlier today. Patient was at home when he fell onto outstretched left hand/wrist.  Patient states pain started her wrist, now radiating up and down hand/forearm. Pain is aching, continuous. 7/10. No numbness/tingling. No improvement with conservative treatment. REVIEW OF SYSTEMS     Review of Systems   Constitutional: Negative for fatigue and fever. Respiratory: Negative for shortness of breath. Cardiovascular: Negative for chest pain. Gastrointestinal: Negative for nausea. Musculoskeletal: Positive for arthralgias and joint swelling. Negative for neck pain and neck stiffness. Skin: Negative for rash. Neurological: Negative for weakness and numbness. Hematological: Bruises/bleeds easily. PAST MEDICAL HISTORY         Diagnosis Date    Bleeding tendency [facility])     Patient states \"From medications\"    CAD (coronary artery disease)     Chest pain     COVID-19 12/2020    Fatigue     HLD (hyperlipidemia)     Hypertension     MI [facility]my[facility] [address]     Snores     SOB (shortness of breath)        SURGICAL HISTORY     Patient  has a past surgical history that includes transthoracic echocardiogram (3-31-11); Cardiac catheterization (4-21-08); Coronary artery bypass graft (148839); Tonsillectomy; hernia repair; knee surgery; cardiovascular stress test (4-01-11); transthoracic echocardiogram (4-21-08); Coronary angioplasty with stent (6-27-13); and Coronary angioplasty with stent (10/30/2016).     CURRENT MEDICATIONS       Previous Medications    ASPIRIN 81 MG EC TABLET    Take 81 mg by mouth every other day     ATORVASTATIN (LIPITOR) 40 MG TABLET    Take 1 tablet by mouth every other day    COENZYME Q10 (CO Q 10 PO)    Take 200 mg by mouth    CPAP MACHINE MISC    by Does not apply route Please change CPAP pressure to 10 cm H20.    FLUTICASONE (FLONASE) 50 MCG/ACT NASAL SPRAY    1 spray by Each Nostril route daily    LISINOPRIL (PRINIVIL;ZESTRIL) 10 MG TABLET    Take 1 tablet by mouth 2 times daily    METOPROLOL TARTRATE (LOPRESSOR) 25 MG TABLET    TAKE ONE TABLET BY MOUTH TWICE A DAY    NITROGLYCERIN (NITROSTAT) 0.4 MG SL TABLET    Place 1 tablet under the tongue every 5 minutes as needed for Chest pain    NONFORMULARY    Biotene mouth spray nightly    PRASUGREL (EFFIENT) 10 MG TABS    TAKE ONE TABLET BY MOUTH ONE TIME A DAY    THERAPEUTIC MULTIVITAMIN-MINERALS (THERAGRAN-M) TABLET    Take 1 tablet by mouth daily. ALLERGIES     Patient is is allergic to codeine and darvon [propoxyphene hcl]. FAMILY HISTORY     Patient'sfamily history includes Heart Disease in his mother; Hypertension in his father; Kidney Disease in his mother; Stroke in his father. SOCIAL HISTORY     Patient  reports that he quit smoking about 14 years ago. His smoking use included cigarettes. He has a 78.00 pack-year smoking history. He has never used smokeless tobacco. He reports that he does not drink alcohol and does not use drugs. PHYSICAL EXAM     ED TRIAGE VITALS  BP: 134/77, Temp: 98.3 °F (36.8 °C), Heart Rate: 78, Resp: 16, SpO2: 98 %  Physical Exam  Vitals and nursing note reviewed. Constitutional:       General: He is not in acute distress. Appearance: Normal appearance. He is well-developed. He is not ill-appearing. HENT:      Head: Normocephalic and atraumatic. Right Ear: External ear normal.      Left Ear: External ear normal.      Nose: No congestion. Eyes:      General: No scleral icterus.      Conjunctiva/sclera: Conjunctivae normal.   Cardiovascular:      Pulses:           Radial pulses are 2+ on the right side and 2+ on the left side. Pulmonary:      Effort: Pulmonary effort is normal. No respiratory distress. Musculoskeletal:      Right wrist: Normal.      Left wrist: Swelling, tenderness and bony tenderness present. No snuff box tenderness. Decreased range of motion. Normal pulse. Right hand: Normal.      Left hand: Tenderness and bony tenderness present. No deformity. Normal range of motion. Normal sensation. Normal capillary refill. Normal pulse. Cervical back: Normal range of motion. Skin:     General: Skin is warm and dry. Capillary Refill: Capillary refill takes less than 2 seconds. Coloration: Skin is not jaundiced. Findings: No bruising, ecchymosis, erythema or rash. Neurological:      Mental Status: He is alert and oriented to person, place, and time. Sensory: Sensation is intact. Psychiatric:         Mood and Affect: Mood normal.         Behavior: Behavior normal. Behavior is cooperative. DIAGNOSTIC RESULTS   Labs: No results found for this visit on 06/09/22. IMAGING:  XR HAND LEFT (MIN 3 VIEWS)   Final Result   1. Small linear ossification along the distal aspect of the ulna may    represent a small avulsion injury. 2. Mild polyarticular degenerative changes of the left hand. This document has been electronically signed by: Levi Boyer MD on    06/09/2022 07:52 PM      XR WRIST LEFT (MIN 3 VIEWS)   Final Result   1. Small linear ossification along the medial aspect of the distal ulna    may represent a small avulsion fracture. Recommend correlation with point    tenderness. 2. Mild degenerative changes of the left wrist.      This document has been electronically signed by: Levi Boyer MD on    06/09/2022 07:51 PM        URGENT CARE COURSE:     Vitals:    06/09/22 1930   BP: 134/77   Pulse: 78   Resp: 16   Temp: 98.3 °F (36.8 °C)   TempSrc: Temporal   SpO2: 98%       Medications - No data to display  PROCEDURES:  None  FINALIMPRESSION      1.  Avulsion fracture of left wrist        DISPOSITION/PLAN   DISPOSITION    X-ray with possible small avulsion fracture to the left distal ulna. Positive clinical correlation. Patient placed in Velcro wrist splint. Patient to likely follow-up with orthopedic walk-in tomorrow. Apply ice, elevate. Over-the-counter medicine for pain. If any distress go to ER. PATIENT REFERRED TO:  Edward Alfaro MD  [address] [facility]  [phone]      Follow-up as needed. Sariah Angulo   [address] [facility] [phone]    Follow-up as needed. Monday through Friday 8-4. Please wear wrist splint. Apply ice 3 times daily minimum. X strength Tylenol for pain.     DISCHARGE MEDICATIONS:  New Prescriptions    No medications on file     Current Discharge Medication List          Nikole Lerma, [address] [facility], APRN - CNP  06/09/22 2001

## 2022-08-04 LAB
CHOLESTEROL, TOTAL: 176 MG/DL (ref 0–199)
FASTING: YES
GLUCOSE BLD-MCNC: 98 MG/DL (ref 74–109)
HDLC SERPL-MCNC: 50 MG/DL (ref 40–90)
LDL CHOLESTEROL CALCULATED: 105 MG/DL
TRIGL SERPL-MCNC: 103 MG/DL (ref 0–199)

## 2022-08-17 ENCOUNTER — TELEPHONE (OUTPATIENT)
Dept: FAMILY MEDICINE CLINIC | Age: 67
End: 2022-08-17

## 2022-08-17 RX ORDER — PREDNISONE 20 MG/1
20 TABLET ORAL DAILY
Qty: 7 TABLET | Refills: 0 | Status: SHIPPED | OUTPATIENT
Start: 2022-08-17 | End: 2022-08-24

## 2022-08-17 NOTE — TELEPHONE ENCOUNTER
Patient called C/O poison ivy wrist and chest.  Req RX to [facility] on [address].    States his wife had it and Dr Nora Martinez called in an RX for Prednisone.     Please call him back [phone]

## 2022-09-13 ENCOUNTER — PROCEDURE VISIT (OUTPATIENT)
Dept: NEUROLOGY | Age: 67
End: 2022-09-13
Payer: COMMERCIAL

## 2022-09-13 DIAGNOSIS — R20.0 NUMBNESS OF LEFT HAND: ICD-10-CM

## 2022-09-13 DIAGNOSIS — G56.02 LEFT CARPAL TUNNEL SYNDROME: Primary | ICD-10-CM

## 2022-09-13 PROCEDURE — 95909 NRV CNDJ TST 5-6 STUDIES: CPT | Performed by: PSYCHIATRY & NEUROLOGY

## 2022-09-13 PROCEDURE — 95886 MUSC TEST DONE W/N TEST COMP: CPT | Performed by: PSYCHIATRY & NEUROLOGY

## 2022-10-07 NOTE — TELEPHONE ENCOUNTER
Pt calling. He states that at the last visit Viagra was discussed but decided to wait. He is now asking for Viagra. States he only wants about 6 pills or so. Please advise.

## 2022-10-10 RX ORDER — SILDENAFIL 50 MG/1
50 TABLET, FILM COATED ORAL DAILY PRN
Qty: 6 TABLET | Refills: 0 | Status: SHIPPED | OUTPATIENT
Start: 2022-10-10

## 2022-10-14 ENCOUNTER — OFFICE VISIT (OUTPATIENT)
Dept: PULMONOLOGY | Age: 67
End: 2022-10-14
Payer: COMMERCIAL

## 2022-10-14 VITALS
BODY MASS INDEX: 27.23 KG/M2 | SYSTOLIC BLOOD PRESSURE: 122 MMHG | HEIGHT: 74 IN | OXYGEN SATURATION: 92 % | HEART RATE: 55 BPM | WEIGHT: 212.2 LBS | DIASTOLIC BLOOD PRESSURE: 80 MMHG | TEMPERATURE: 98 F

## 2022-10-14 DIAGNOSIS — G47.33 OSA ON CPAP: Primary | ICD-10-CM

## 2022-10-14 DIAGNOSIS — Z99.89 OSA ON CPAP: Primary | ICD-10-CM

## 2022-10-14 PROCEDURE — 99213 OFFICE O/P EST LOW 20 MIN: CPT | Performed by: NURSE PRACTITIONER

## 2022-10-14 PROCEDURE — 1123F ACP DISCUSS/DSCN MKR DOCD: CPT | Performed by: NURSE PRACTITIONER

## 2022-10-14 ASSESSMENT — ENCOUNTER SYMPTOMS
NAUSEA: 0
WHEEZING: 0
STRIDOR: 0
EYES NEGATIVE: 1
DIARRHEA: 0
GASTROINTESTINAL NEGATIVE: 1
RESPIRATORY NEGATIVE: 1
ALLERGIC/IMMUNOLOGIC NEGATIVE: 1
CHEST TIGHTNESS: 0
VOMITING: 0

## 2022-10-14 NOTE — PROGRESS NOTES
[place] [facility], Critical Care and Sleep Medicine      David Kumar         [id number]  10/14/2022   Chief Complaint   Patient presents with    Follow-up     1 year REANNA        Pt of Dr. Larry ABAD Download:   Jose Engel or initial AHI: 4.8     Date of initial study: 4/29/2011      Compliant  100%     Noncompliant 0 %     PAP Type CPAP Level  11   Avg Hrs/Day 7  AHI: 1.6   Recorded compliance dates : 9/14/22-10/13/22  Machine/Mfg:   [x] ResMed    [] Respironics/Dreamstation   Interface:   [] Nasal    [] Nasal pillows   [x] FFM      Provider:      [x] [facility]     [][facility]     [] [facility]    [] Perez Zullinger    [] [facility]               [] [facility]      [] [facility]    [] [address]      [] Other    Neck Size: 15  Mallampati 3  ESS:  5  SAQLI: 88    Here is a scan of the most recent download:            Presentation:   Shira Sweeney presents for sleep medicine follow up for obstructive sleep apnea  Since the last visit, Shira Sweeney has been compliant with his CPAP therapy and continues to see benefit from its use   Awake and alert today in the office     Equipment issues: The pressure is  acceptable, the mask is acceptable     Sleep issues:  Do you feel better? Yes  More rested? Yes   Better concentration? NA    Progress History:   Since last visit any new medical issues? No  New ER or hospital visits? No  Any new or changes in medicines? No  Any new sleep medicines? No    Review of Systems -   Review of Systems   Constitutional: Negative. Negative for chills, fever and unexpected weight change. HENT: Negative. Eyes: Negative. Respiratory: Negative. Negative for chest tightness, wheezing and stridor. Cardiovascular:  Negative for chest pain and leg swelling. Gastrointestinal: Negative. Negative for diarrhea, nausea and vomiting. Endocrine: Negative. Genitourinary: Negative. Negative for dysuria. Musculoskeletal: Negative. Skin: Negative. Allergic/Immunologic: Negative. Neurological: Negative. Hematological: Negative. Psychiatric/Behavioral: Negative. Physical Exam:    BMI:  Body mass index is 27.24 kg/m². Wt Readings from Last 3 Encounters:   10/14/22 212 lb 3.2 oz (96.3 kg)   03/25/22 216 lb (98 kg)   03/16/22 216 lb (98 kg)     Weight stable / unchanged  Vitals: /80 (Site: Left Upper Arm, Position: Sitting, Cuff Size: Medium Adult)   Pulse 55   Temp 98 °F (36.7 °C) (Skin)   Ht 6' 2\" (1.88 m)   Wt 212 lb 3.2 oz (96.3 kg)   SpO2 92%   BMI 27.24 kg/m²       Physical Exam  Vitals and nursing note reviewed. Constitutional:       General: He is not in acute distress. Appearance: He is well-developed. HENT:      Head: Normocephalic and atraumatic. Neck:      Trachea: No tracheal deviation. Cardiovascular:      Rate and Rhythm: Normal rate and regular rhythm. Heart sounds: Normal heart sounds. No murmur heard. Pulmonary:      Effort: Pulmonary effort is normal. No respiratory distress. Breath sounds: Normal breath sounds. No stridor. No wheezing or rales. Chest:      Chest wall: No tenderness. Abdominal:      General: Bowel sounds are normal. There is no distension. Palpations: Abdomen is soft. Skin:     General: Skin is warm and dry. Capillary Refill: Capillary refill takes less than 2 seconds. Neurological:      Mental Status: He is alert and oriented to person, place, and time. Psychiatric:         Behavior: Behavior normal.         Thought Content: Thought content normal.         Judgment: Judgment normal.     ASSESSMENT/DIAGNOSIS     Diagnosis Orders   1. REANNA on CPAP  DME Order for CPAP as OP           Plan   Do you need any equipment today? Yes   - Download reviewed and discussed with patient  - He  was advised to continue current positive airway pressure therapy with above described pressure.    - He  advised to keep good compliance with current recommended pressure to get optimal results and clinical improvement  - Recommend 7-9 hours of sleep with PAP  - He was advised to call [employer] regarding supplies if needed.   -He call my office for earlier appointment if needed for worsening of sleep symptoms.   - He was instructed on weight loss  - Kendall Son was educated about my impression and plan. Patient verbalizesunderstanding.   We will see Naa Walker back in: 1 year with download    Information added by my medical assistant/LPN was reviewed today     Sofia Aragon, [address] [place] [address] for pulmonary and Sleep Medicine  10/14/2022

## 2023-01-07 DIAGNOSIS — I25.119 CORONARY ARTERY DISEASE WITH ANGINA PECTORIS, UNSPECIFIED VESSEL OR LESION TYPE, UNSPECIFIED WHETHER NATIVE OR TRANSPLANTED HEART (HCC): ICD-10-CM

## 2023-01-07 DIAGNOSIS — I10 ESSENTIAL HYPERTENSION: ICD-10-CM

## 2023-01-09 DIAGNOSIS — I10 ESSENTIAL HYPERTENSION: Primary | ICD-10-CM

## 2023-01-09 NOTE — TELEPHONE ENCOUNTER
Also, ask him why he takes the atorvastatin every other day. It would be good to get a fasting LDL now as the one from the [facility] back in August could be better.

## 2023-01-09 NOTE — TELEPHONE ENCOUNTER
Date of last visit:  1/28/2022  Date of next visit:  Visit date not found    Requested Prescriptions     Pending Prescriptions Disp Refills    atorvastatin (LIPITOR) 40 MG tablet [Pharmacy Med Name: ATORVASTATIN CALCIUM 40MG TABS] 45 tablet 3     Sig: TAKE ONE TABLET BY MOUTH EVERY OTHER DAY    lisinopril (PRINIVIL;ZESTRIL) 10 MG tablet [Pharmacy Med Name: LISINOPRIL 10MG TABS] 180 tablet 3     Sig: TAKE 1 TABLET BY MOUTH 2 TIMES A DAY    metoprolol tartrate (LOPRESSOR) 25 MG tablet [Pharmacy Med Name: METOPROLOL TARTRATE 25MG TABS] 180 tablet 3     Sig: TAKE 1 TABLET BY MOUTH 2 TIMES A DAY    prasugrel (EFFIENT) 10 MG TABS [Pharmacy Med Name: PRASUGREL HCL 10MG TABS] 90 tablet 3     Sig: TAKE 1 TABLET BY MOUTH ONE TIME A DAY

## 2023-01-10 RX ORDER — PRASUGREL 10 MG/1
TABLET, FILM COATED ORAL
Qty: 90 TABLET | Refills: 3 | OUTPATIENT
Start: 2023-01-10

## 2023-01-10 RX ORDER — ATORVASTATIN CALCIUM 40 MG/1
TABLET, FILM COATED ORAL
Qty: 45 TABLET | Refills: 3 | Status: SHIPPED | OUTPATIENT
Start: 2023-01-10

## 2023-01-10 RX ORDER — LISINOPRIL 10 MG/1
TABLET ORAL
Qty: 180 TABLET | Refills: 3 | Status: SHIPPED | OUTPATIENT
Start: 2023-01-10

## 2023-01-10 NOTE — TELEPHONE ENCOUNTER
Ralph Fernandez informed by Phone  patient said he has been taking atorvastain qod for many years. Dr. Emeterio Foley ordered that for him that way. He will get labs soon at [address] [place] [address],[address] labs. Julia Kimble

## 2023-01-11 ENCOUNTER — TELEPHONE (OUTPATIENT)
Dept: FAMILY MEDICINE CLINIC | Age: 68
End: 2023-01-11

## 2023-01-11 DIAGNOSIS — I25.119 CORONARY ARTERY DISEASE WITH ANGINA PECTORIS, UNSPECIFIED VESSEL OR LESION TYPE, UNSPECIFIED WHETHER NATIVE OR TRANSPLANTED HEART (HCC): ICD-10-CM

## 2023-01-11 RX ORDER — PRASUGREL 10 MG/1
TABLET, FILM COATED ORAL
Qty: 90 TABLET | Refills: 0 | Status: SHIPPED | OUTPATIENT
Start: 2023-01-11

## 2023-01-11 NOTE — TELEPHONE ENCOUNTER
Pt's spouse called stating pt has LDL done August 4,2022 and is asking if doctor could just use those results from the [facility] Messaging Screen    Please call pt once addressed at [phone]    Let them know what doctor is going to order or not order so they can contact

## 2023-01-11 NOTE — TELEPHONE ENCOUNTER
Rogelio Worrell called requesting a refill on the following medications:  Requested Prescriptions     Pending Prescriptions Disp Refills    prasugrel (EFFIENT) 10 MG TABS 90 tablet 3     Sig: TAKE ONE TABLET BY MOUTH ONE TIME A DAY     Pharmacy verified:  .[facility]  [address], [address], [address] [phone] Dylon Amor [phone]    Date of last visit: 03/16/2022  Date of next visit (if applicable): 1/84/6279    Patient's PCP contacted him and is requesting that he get this medication prescribed by his cardiologist.

## 2023-01-12 NOTE — TELEPHONE ENCOUNTER
The LDL in the [facility] labs was 105 and with his past history of MI it should be in the low  80s. I would like to check it now and if it's not in the low 80s the lipid meds would need to be adjusted.

## 2023-01-13 ENCOUNTER — NURSE ONLY (OUTPATIENT)
Dept: LAB | Age: 68
End: 2023-01-13

## 2023-01-13 DIAGNOSIS — I10 ESSENTIAL HYPERTENSION: ICD-10-CM

## 2023-01-13 DIAGNOSIS — R97.20 ELEVATED PSA, BETWEEN 10 AND LESS THAN 20 NG/ML: ICD-10-CM

## 2023-01-13 LAB
LDL CHOLESTEROL DIRECT: 86.76 MG/DL
PROSTATE SPECIFIC ANTIGEN: 9.49 NG/ML (ref 0–1)

## 2023-01-14 NOTE — RESULT ENCOUNTER NOTE
The LDL now is much better than the result from the [facility]. Continue the current way of taking the atorvastatin. The PSA while better than a year ago is still more than double the value of the majority of men. It would be a good idea to see the urologist again to discuss the new result. We can contact their office if he agrees.

## 2023-01-16 ENCOUNTER — TELEPHONE (OUTPATIENT)
Dept: FAMILY MEDICINE CLINIC | Age: 68
End: 2023-01-16

## 2023-01-16 DIAGNOSIS — R97.20 ELEVATED PSA, LESS THAN 10 NG/ML: Primary | ICD-10-CM

## 2023-01-16 NOTE — TELEPHONE ENCOUNTER
----- Message from Vaibhav Scruggs MD sent at 1/13/2023  9:57 PM EST -----  The LDL now is much better than the result from the [facility]. Continue the current way of taking the atorvastatin. The PSA while better than a year ago is still more than double the value of the majority of men. It would be a good idea to see the urologist again to discuss the new result. We can contact their office if he agrees.

## 2023-01-31 ENCOUNTER — OFFICE VISIT (OUTPATIENT)
Dept: UROLOGY | Age: 68
End: 2023-01-31
Payer: COMMERCIAL

## 2023-01-31 ENCOUNTER — TELEPHONE (OUTPATIENT)
Dept: UROLOGY | Age: 68
End: 2023-01-31

## 2023-01-31 VITALS
WEIGHT: 210 LBS | DIASTOLIC BLOOD PRESSURE: 80 MMHG | HEIGHT: 74 IN | BODY MASS INDEX: 26.95 KG/M2 | SYSTOLIC BLOOD PRESSURE: 124 MMHG

## 2023-01-31 DIAGNOSIS — N40.1 BPH WITH OBSTRUCTION/LOWER URINARY TRACT SYMPTOMS: ICD-10-CM

## 2023-01-31 DIAGNOSIS — R97.20 ELEVATED PSA: Primary | ICD-10-CM

## 2023-01-31 DIAGNOSIS — N13.8 BPH WITH OBSTRUCTION/LOWER URINARY TRACT SYMPTOMS: ICD-10-CM

## 2023-01-31 LAB
BILIRUBIN URINE: NEGATIVE
BLOOD URINE, POC: NEGATIVE
CHARACTER, URINE: CLEAR
COLOR, URINE: YELLOW
GLUCOSE URINE: NEGATIVE MG/DL
KETONES, URINE: NEGATIVE
LEUKOCYTE CLUMPS, URINE: NEGATIVE
NITRITE, URINE: NEGATIVE
PH, URINE: 5.5 (ref 5–9)
POST VOID RESIDUAL (PVR): 85 ML
PROTEIN, URINE: NEGATIVE MG/DL
SPECIFIC GRAVITY, URINE: 1.01 (ref 1–1.03)
UROBILINOGEN, URINE: 0.2 EU/DL (ref 0–1)

## 2023-01-31 PROCEDURE — 51798 US URINE CAPACITY MEASURE: CPT | Performed by: UROLOGY

## 2023-01-31 PROCEDURE — 3074F SYST BP LT 130 MM HG: CPT | Performed by: UROLOGY

## 2023-01-31 PROCEDURE — 99214 OFFICE O/P EST MOD 30 MIN: CPT | Performed by: UROLOGY

## 2023-01-31 PROCEDURE — 3079F DIAST BP 80-89 MM HG: CPT | Performed by: UROLOGY

## 2023-01-31 PROCEDURE — 81003 URINALYSIS AUTO W/O SCOPE: CPT | Performed by: UROLOGY

## 2023-01-31 PROCEDURE — 1123F ACP DISCUSS/DSCN MKR DOCD: CPT | Performed by: UROLOGY

## 2023-01-31 RX ORDER — TAMSULOSIN HYDROCHLORIDE 0.4 MG/1
0.4 CAPSULE ORAL DAILY
Qty: 90 CAPSULE | Refills: 3 | Status: SHIPPED | OUTPATIENT
Start: 2023-01-31 | End: 2023-05-01

## 2023-01-31 NOTE — TELEPHONE ENCOUNTER
Patient scheduled for MRI PROSTATE W WO  at [facility] MR on 2/20/2023 ARRIVAL OF 9AM FOR A 930AM SCAN.     Order mailed with instructions to the patient

## 2023-01-31 NOTE — PROGRESS NOTES
Joann Thomas MD.    [address] [facility] [id number]  Dept: [phone]  Dept Fax: [phone]: [address] [facility] Urology Office Note -     Patient:  Cecilia Acosta  YOB: 1955    The patient is a 77 y.o. male who presents today for evaluation of the following problems:   Chief Complaint   Patient presents with    New Patient     Elevated PSA     referred/consultation requested by Thais Delaney MD.    History of Present Illness:    Elevated PSA  Persistently elevated  Negative biopsy in 2016. BPH  Nocturia x 3  Worsening  Does have some abdominal discomfort  May have taken flomax     ED  Worsening  Bothered  Has not taken sildenafil but has it prescribed (by dr Guadelupe Dancer) on nitro    Low libido  Since open heart surgery        Requested/reviewed records from Thais Delaney MD office and/or outside [de-identified]    (Patient's old records have been requested, reviewed and pertinent findings summarized in today's note.)    Procedures Today: N/A      Last several PSA's:  Lab Results   Component Value Date    PSA 9.49 (H) 01/13/2023    PSA 12.36 (H) 03/04/2021    PSA 6.70 (H) 11/20/2015       Last total testosterone:  No results found for: TESTOSTERONE    Urinalysis today:  Results for POC orders placed in visit on 01/31/23   poct post void residual   Result Value Ref Range    post void residual 85 ml       Last BUN and creatinine:  Lab Results   Component Value Date    BUN 15 12/10/2020     Lab Results   Component Value Date    CREATININE 0.7 12/10/2020         Imaging Reviewed during this Office Visit:   Che Cotton MD independently reviewed the images and verified the radiology reports from:    XR WRIST LEFT (MIN 3 VIEWS)    Result Date: 6/9/2022  4 views of the left wrist. COMPARISON: Radiographs of the left hand dated 7/16/2014 FINDINGS: Distal radius appears intact.  Unchanged irregularity along the ulnar aspect of the distal radius. Small linear radiopaque ossification along the medial aspect of the distal ulna may represent a small avulsion fracture. Carpal and metacarpals appear intact and normal in alignment. Small osteophyte present along the triquetral. Mild degenerative changes of the STT joint. Ring overlies the partially visualized 4th proximal phalanx. 1. Small linear ossification along the medial aspect of the distal ulna may represent a small avulsion fracture. Recommend correlation with point tenderness. 2. Mild degenerative changes of the left wrist. This document has been electronically signed by: Elliott Lockwood MD on 06/09/2022 07:51 PM    XR HAND LEFT (MIN 3 VIEWS)    Result Date: 6/9/2022  4 views of the left hand. COMPARISON: Radiograph of the left hand dated 7/16/2014, radiographs of the left wrist performed at the same time. FINDINGS: Ring overlies the 4th proximal phalanx. Small linear ossification along the distal aspect of the ulna may represent a small avulsion injury. Distal radius appears intact. Mild degenerative changes of the STT joint with small osteophytes. Mild degenerative changes of the 1st MTP joint with small osteophytes. Degenerative changes of the interphalangeal joints with joint space narrowing. 1. Small linear ossification along the distal aspect of the ulna may represent a small avulsion injury. 2. Mild polyarticular degenerative changes of the left hand. This document has been electronically signed by:  Elliott Lockwood MD on 06/09/2022 07:52 PM      PAST MEDICAL, FAMILY AND SOCIAL HISTORY:  Past Medical History:   Diagnosis Date    Bleeding tendency [facility])     Patient states \"From medications\"    CAD (coronary artery disease)     Chest pain     COVID-19 12/2020    Fatigue     HLD (hyperlipidemia)     Hypertension     MI (myocardial infarction) ([facility] [address]     Snores     SOB (shortness of breath)      Past Surgical History:   Procedure Laterality Date    CARDIAC CATHETERIZATION  4-21-08    Significant vasculopathy w/ severe disease in the coronary arteries, pretty much aneurysmal and diffuse in nature everywhere, involving pretty much every segment of coronary arteries. Critical stenosis of the left circ. which is likely to be culprit vessel. Significant aortic disease as well. Mild LV dysfunction. CARDIOVASCULAR STRESS TEST  4-01-11    Moderate inferolateral infarct w/o obvious significant stress induced ischemia. EF 58%. Mild wall motion abnormality. CORONARY ANGIOPLASTY WITH STENT PLACEMENT  6-27-13    CORONARY ANGIOPLASTY WITH STENT PLACEMENT  10/30/2016    3 stent by Dr Friedman All ECHOCARDIOGRAM  3-31-11    LV size normal. Systolic function mildly reduced. EF 50-55%. Mild diffuse hypokinesis. Wall thickness mildly increased. Mild concentric LVH. TRANSTHORACIC ECHOCARDIOGRAM  4-21-08    Global LV dysfunction. EF 40%. Calcified AV w/o obvious stenosis. Mild MR and TR. Dilated ascending aorta.      Family History   Problem Relation Age of Onset    Hypertension Father     Stroke Father     Heart Disease Mother     Kidney Disease Mother      Outpatient Medications Marked as Taking for the 1/31/23 encounter (Office Visit) with Sol Valenzuela MD   Medication Sig Dispense Refill    prasugrel (EFFIENT) 10 MG TABS TAKE ONE TABLET BY MOUTH ONE TIME A DAY 90 tablet 0    atorvastatin (LIPITOR) 40 MG tablet TAKE ONE TABLET BY MOUTH EVERY OTHER DAY 45 tablet 3    lisinopril (PRINIVIL;ZESTRIL) 10 MG tablet TAKE 1 TABLET BY MOUTH 2 TIMES A  tablet 3    metoprolol tartrate (LOPRESSOR) 25 MG tablet TAKE 1 TABLET BY MOUTH 2 TIMES A  tablet 3    Coenzyme Q10 (CO Q 10 PO) Take 200 mg by mouth      CPAP Machine MISC by Does not apply route Please change CPAP pressure to 10 cm H20. 1 each 0    therapeutic multivitamin-minerals (THERAGRAN-M) tablet Take 1 tablet by mouth daily. aspirin 81 MG EC tablet Take 81 mg by mouth every other day          Codeine and Darvon [propoxyphene hcl]  Social History     Tobacco Use   Smoking Status Former    Packs/day: 2.00    Years: 39.00    Pack years: 78.00    Types: Cigarettes    Quit date: 2008    Years since quittin.7   Smokeless Tobacco Never      (If patient a smoker, smoking cessation counseling offered)   Social History     Substance and Sexual Activity   Alcohol Use No    Alcohol/week: 0.0 standard drinks       REVIEW OF SYSTEMS:  Constitutional: negative  Eyes: negative  Respiratory: negative  Cardiovascular: negative  Gastrointestinal: negative  Genitourinary: see HPI  Musculoskeletal: negative  Skin: negative   Neurological: negative  Hematological/Lymphatic: negative  Psychological: negative        Physical Exam:    This a 77 y.o. male  Vitals:    23 1131   BP: 124/80     Body mass index is 26.96 kg/m². Constitutional: Patient in no acute distress;       Assessment and Plan        1. Elevated PSA    2. BPH with obstruction/lower urinary tract symptoms               Plan:      Elevated PSA- prostate mri. Hx of biopsy in past in . BPH- worsening. Start flomax. ED/low libido- start sildenafil 50 mg. Will address low libido. Prescriptions Ordered:  No orders of the defined types were placed in this encounter.      Orders Placed:  Orders Placed This Encounter   Procedures    poct post void residual     Bladder scan    POCT Urinalysis No Micro (Auto)            JACI Nguyen MD

## 2023-02-20 ENCOUNTER — HOSPITAL ENCOUNTER (OUTPATIENT)
Dept: MRI IMAGING | Age: 68
Discharge: HOME | End: 2023-02-20
Payer: COMMERCIAL

## 2023-02-20 DIAGNOSIS — R97.20 ELEVATED PSA: ICD-10-CM

## 2023-02-20 LAB — POC CREATININE WHOLE BLOOD: 1 MG/DL (ref 0.5–1.2)

## 2023-02-20 PROCEDURE — 6360000004 HC RX CONTRAST MEDICATION: Performed by: UROLOGY

## 2023-02-20 PROCEDURE — A9579 GAD-BASE MR CONTRAST NOS,1ML: HCPCS | Performed by: UROLOGY

## 2023-02-20 PROCEDURE — 72197 MRI PELVIS W/O & W/DYE: CPT

## 2023-02-20 PROCEDURE — 82565 ASSAY OF CREATININE: CPT

## 2023-02-20 RX ADMIN — GADOTERIDOL 20 ML: 279.3 INJECTION, SOLUTION INTRAVENOUS at 10:32

## 2023-02-28 ENCOUNTER — OFFICE VISIT (OUTPATIENT)
Dept: UROLOGY | Age: 68
End: 2023-02-28
Payer: COMMERCIAL

## 2023-02-28 ENCOUNTER — TELEPHONE (OUTPATIENT)
Dept: CARDIOLOGY CLINIC | Age: 68
End: 2023-02-28

## 2023-02-28 VITALS — WEIGHT: 206 LBS | BODY MASS INDEX: 25.61 KG/M2 | HEIGHT: 75 IN | RESPIRATION RATE: 18 BRPM

## 2023-02-28 DIAGNOSIS — R35.1 NOCTURIA: ICD-10-CM

## 2023-02-28 DIAGNOSIS — R06.02 SHORTNESS OF BREATH: ICD-10-CM

## 2023-02-28 DIAGNOSIS — R97.20 ELEVATED PSA: Primary | ICD-10-CM

## 2023-02-28 DIAGNOSIS — N40.1 BPH WITH OBSTRUCTION/LOWER URINARY TRACT SYMPTOMS: ICD-10-CM

## 2023-02-28 DIAGNOSIS — N13.8 BPH WITH OBSTRUCTION/LOWER URINARY TRACT SYMPTOMS: ICD-10-CM

## 2023-02-28 DIAGNOSIS — I71.40 ABDOMINAL AORTIC ANEURYSM (AAA) WITHOUT RUPTURE, UNSPECIFIED PART: Primary | ICD-10-CM

## 2023-02-28 DIAGNOSIS — I10 PRIMARY HYPERTENSION: ICD-10-CM

## 2023-02-28 PROCEDURE — 1123F ACP DISCUSS/DSCN MKR DOCD: CPT | Performed by: UROLOGY

## 2023-02-28 PROCEDURE — 99214 OFFICE O/P EST MOD 30 MIN: CPT | Performed by: UROLOGY

## 2023-02-28 NOTE — PROGRESS NOTES
Hema Horn MD.    [address] [facility] [phone]  Dept: [phone]  Dept Fax: [phone]: [address] [facility] Urology Office Note -     Patient:  Donis Agneles  YOB: 1955    The patient is a 77 y.o. male who presents today for evaluation of the following problems:   Chief Complaint   Patient presents with    Results     Mri Results      referred/consultation requested by Marimar Villarreal MD.    History of Present Illness:    Elevated PSA  Persistently elevated/oscillating  Here with MRI  Negative biopsy in 2016. BPH  80 g  Nocturia x 3  Not at goal  Does have some abdominal discomfort  Flomax with some improvement    ED  Worsening  Bothered  Has not taken sildenafil but has it prescribed (by dr Melia Coto) on nitro    Low libido  Since open heart surgery        Requested/reviewed records from Marimar Villarreal MD office and/or outside physician/EMR    (Patient's old records have been requested, reviewed and pertinent findings summarized in today's note.)    Procedures Today: N/A      Last several PSA's:  Lab Results   Component Value Date    PSA 9.49 (H) 01/13/2023    PSA 12.36 (H) 03/04/2021    PSA 6.70 (H) 11/20/2015       Last total testosterone:  No results found for: TESTOSTERONE    Urinalysis today:  No results found for this visit on 02/28/23. Last BUN and creatinine:  Lab Results   Component Value Date    BUN 15 12/10/2020     Lab Results   Component Value Date    CREATININE 0.7 12/10/2020         Imaging Reviewed during this Office Visit:   imaging independently reviewed by Rashmi Barrera MD and radiology report verified demonstrating     MRI PROSTATE W WO CONTRAST    Result Date: 2/20/2023  PROCEDURE: MRI PROSTATE W WO CONTRAST CLINICAL INFORMATION: Elevated PSA COMPARISON: CT abdomen and pelvis 5/13/2016 TECHNIQUE: Axial T2, coronal T2 and sagittal T2 imaging of the prostate gland. Large field of view axial T2 imaging of the prostate gland. Axial T1, axial T1 VIBE dynamic post dunia and axial T1 VIBE dynamic subtracted post dunia images through the prostate gland. Diffusion 50, 800, 1400 and ADC maps through the prostate gland. Axial T1 STAR VIBE post dunia through the pelvis. 3-D images reconstructed on a separate [employer] workstation with MRI TRUS fusion of prostate mass lesions. CONTRAST: 20  mL of ProHance  intravenously. LABORATORY: 1. PSA on 1/13/2023 was 9.49 ng/ml 2. PSA on 4/26/2021 was 9.6 ng/ml 3. PSA on 3/4/2021 was 12.36 ng/ml 4. PSA on 11/20/2015 was 6.7 ng/ml FINDINGS: PROSTATE SIZE: (As measured on Most[employer] cad workstation) 1. The prostate gland is markedly enlarged measuring 5.5 x 4.9 x 6.2 cm 2. The prostate volume is 81.94 ml. TRANSITIONAL ZONE: 1. Enlarged transitional zone with multiple nodules that can reflect benign prostatic hypertrophy (PI-RADS 2) CENTRAL ZONE: 1. Unremarkable. PERIPHERAL ZONE: 1. Predominantly T2 hyperintense. 2. There is an area of intermediate T2 signal within the mid peripheral zone that reflects an area of old inflammation or scarring. SEMINAL VESICLES: 1. Predominantly T2 hyperintense without restricted diffusion. This can reflect old inflammatory changes. NEUROVASCULAR BUNDLES: Intact URINARY BLADDER/RECTUM/PELVIC DIAPHRAGM: Unremarkable. PATHOLOGICALLY ENLARGED LYMPH NODES: 1. None. OSSEOUS STRUCTURES: 1. No suspicious osseous lesion OTHER: 1. A 3 x 2.6 cm aneurysm of the distal abdominal aorta. 2. 1.7 cm ectasia of the right common iliac artery. 3. Atherosclerotic disease of the aortoiliac axis. 1. Enlarged transverse zone with multiple nodules as can be seen with benign prostatic hypertrophy (PI-RADS 2) 2. The prostate is markedly enlarged. 3. No significant lymphadenopathy is seen in the visualized pelvis. 4. A 3 x 2.6 cm aneurysm of the distal abdominal aorta. Aortic ultrasound is recommended for further evaluation.        PAST MEDICAL, FAMILY AND SOCIAL HISTORY:  Past Medical History:   Diagnosis Date    Bleeding tendency [facility])     Patient states \"From medications\"    CAD (coronary artery disease)     Chest pain     COVID-19 12/2020    Fatigue     HLD (hyperlipidemia)     Hypertension     MI (myocardial infarction) ([address]     Snores     SOB (shortness of breath)      Past Surgical History:   Procedure Laterality Date    CARDIAC CATHETERIZATION  4-21-08    Significant vasculopathy w/ severe disease in the coronary arteries, pretty much aneurysmal and diffuse in nature everywhere, involving pretty much every segment of coronary arteries. Critical stenosis of the left circ. which is likely to be culprit vessel. Significant aortic disease as well. Mild LV dysfunction. CARDIOVASCULAR STRESS TEST  4-01-11    Moderate inferolateral infarct w/o obvious significant stress induced ischemia. EF 58%. Mild wall motion abnormality. CORONARY ANGIOPLASTY WITH STENT PLACEMENT  6-27-13    CORONARY ANGIOPLASTY WITH STENT PLACEMENT  10/30/2016    3 stent by Dr Emiliano Kuhn ECHOCARDIOGRAM  3-31-11    LV size normal. Systolic function mildly reduced. EF 50-55%. Mild diffuse hypokinesis. Wall thickness mildly increased. Mild concentric LVH. TRANSTHORACIC ECHOCARDIOGRAM  4-21-08    Global LV dysfunction. EF 40%. Calcified AV w/o obvious stenosis. Mild MR and TR. Dilated ascending aorta.      Family History   Problem Relation Age of Onset    Hypertension Father     Stroke Father     Heart Disease Mother     Kidney Disease Mother      Outpatient Medications Marked as Taking for the 2/28/23 encounter (Office Visit) with Estiven Will MD   Medication Sig Dispense Refill    tamsulosin (FLOMAX) 0.4 MG capsule Take 1 capsule by mouth daily 90 capsule 3    atorvastatin (LIPITOR) 40 MG tablet TAKE ONE TABLET BY MOUTH EVERY OTHER DAY 45 tablet 3    lisinopril (PRINIVIL;ZESTRIL) 10 MG tablet TAKE 1 TABLET BY MOUTH 2 TIMES A  tablet 3    metoprolol tartrate (LOPRESSOR) 25 MG tablet TAKE 1 TABLET BY MOUTH 2 TIMES A  tablet 3    Coenzyme Q10 (CO Q 10 PO) Take 200 mg by mouth      CPAP Machine MISC by Does not apply route Please change CPAP pressure to 10 cm H20. 1 each 0    therapeutic multivitamin-minerals (THERAGRAN-M) tablet Take 1 tablet by mouth daily. aspirin 81 MG EC tablet Take 81 mg by mouth every other day          Codeine and Darvon [propoxyphene hcl]  Social History     Tobacco Use   Smoking Status Former    Packs/day: 2.00    Years: 39.00    Pack years: 78.00    Types: Cigarettes    Quit date: 2008    Years since quittin.8   Smokeless Tobacco Never      (If patient a smoker, smoking cessation counseling offered)   Social History     Substance and Sexual Activity   Alcohol Use No    Alcohol/week: 0.0 standard drinks       REVIEW OF SYSTEMS:  Constitutional: negative  Eyes: negative  Respiratory: negative  Cardiovascular: negative  Gastrointestinal: negative  Genitourinary: see HPI  Musculoskeletal: negative  Skin: negative   Neurological: negative  Hematological/Lymphatic: negative  Psychological: negative        Physical Exam:    This a 77 y.o. male  Vitals:    23 1055   Resp: 18     Body mass index is 25.75 kg/m². Constitutional: Patient in no acute distress;       Assessment and Plan        1. Elevated PSA    2. BPH with obstruction/lower urinary tract symptoms    3. Nocturia               Plan:      Elevated PSA- prostate mri PIRADS-2. Hx of biopsy in past in . Repeat random biopsy prior to any outlet sx  BPH- minimal improvement with flomax. 80 g prostate. Urinary retention on mri with some abdominal discomfort. Interested in further therapies for this. ED/low libido- start sildenafil 50 mg. Will address low libido at a diff date. Cysto and random 12 core prostate biopsy.  If no cancer, likely outlet surgery    Prescriptions Ordered:  No orders of the defined types were placed in this encounter. Orders Placed:  No orders of the defined types were placed in this encounter.            Ciaran oMore MD

## 2023-02-28 NOTE — TELEPHONE ENCOUNTER
Pt calling and wanted to make sure Dr Isael Lindsey was aware of the AAA they found from an MRI of the prostate. Any new orders? Pt has an appt 3/15/23      Impression   1. Enlarged transverse zone with multiple nodules as can be seen with benign prostatic hypertrophy (PI-RADS 2)   2. The prostate is markedly enlarged. 3. No significant lymphadenopathy is seen in the visualized pelvis. 4. A 3 x 2.6 cm aneurysm of the distal abdominal aorta. Aortic ultrasound is recommended for further evaluation.

## 2023-03-01 ENCOUNTER — TELEPHONE (OUTPATIENT)
Dept: UROLOGY | Age: 68
End: 2023-03-01

## 2023-03-01 RX ORDER — CIPROFLOXACIN 500 MG/1
500 TABLET, FILM COATED ORAL 2 TIMES DAILY
Qty: 6 TABLET | Refills: 0 | Status: SHIPPED | OUTPATIENT
Start: 2023-03-01 | End: 2023-03-04

## 2023-03-01 NOTE — TELEPHONE ENCOUNTER
Patient  cipro was sent to the pharmacy to start the day before the procedure. He voiced understanding.

## 2023-03-01 NOTE — TELEPHONE ENCOUNTER
PROSTATE ULTRASOUND/BIOPSY WITH  [address]      1955    You are scheduled for the above procedure on:    3/14/2023   at:    8:30AM  Your follow up appointment for your biopsy results is on:    3/21/2023     At:   3:30PM    Please note that you will be responsible for any charges that are not paid by your insurance. The prostate biopsy specimens are sent to a Lab and their charges are billed to you separate from the office charges. DESCRIPTION OF PROSTATIC ULTRASOUND AND BIOPSY  Ultrasound uses harmless sound waves to give us pictures of the prostate, and allows us to accurately guide a biopsy needle to areas of concern. The procedure is done in the office. Initially, a complete prostate exam is done. Next the ultrasound probe, finger-like in size and shape, is placed into the rectum. With slight movement of the probe, many different views are obtained. A prostate block may or may not be given at this time. A spring loaded fine needle is place through the probe and directed into the prostate. Six or more biopsies are usually taken. These biopsies are not usually painful. The entire exam takes 20-30 minutes. POSSIBLE RISKS OF THE PROCEDURE  Blood may be noted in the stool and urine for a few days. Blood may be seen in the semen for up to a month. Infection of the prostate or in the urine can occur even with antibiotic preparation. You should call if you develop fever, chills, severe pain, or have continuous or significant bleeding. If you have known hemorrhoids, you may have more bleeding and discomfort in the rectal area following the biopsy. This may last for 3-5 days afterwards. If any concerns arise, please call the office.     PREPARATION** PLEASE EAT A REGULAR LUNCH OR BREAKFAST**    1.  DO NOT TAKE: ASPIRIN, MOTRIN,  IBUPROFEN, MOBIC/ MELOXICAM, COUMADIN( WARFARIN) FISH OIL, AND VITAMIN E FOR 5 DAYS BEFORE THE BIOPSY AND 3 DAYS AFTER THE BIOPSY. YOU MAY TAKE TYLENOL IF NEEDED  2. Take your antibiotics as directed:  Cipro 500 mg twice a day, start on:   3/13/2023    take until finished. 3.  Office to provide the Tobramycin an injectable antibiotic the day of the procedure . You will be given the injection once you are brought back to the room  4. Do a Fleets Enema 2 hours before the visit. Purchase it at any drug store and follow the instructions on the package. 5. Please ensure to bring a  with you the day of the surgery to transport you home. HOME GOING AND FOLLOW UP INSTRUCTIONS  Call the doctor if: 1. There is a large amount of blood or clots in the urine or stool. 2.  You are unable to urinate. 3.  If your temperature is 101 degrees F or greater. 4.  You could see blood in your semen for up to 2-months            5.   You may resume your regular medication 3-days after procedure    DATE: 3/1/2023       SIGNATURE:________________________________

## 2023-03-01 NOTE — TELEPHONE ENCOUNTER
Pt last seen 3/16/22 has upcoming appt 3/15/23. No openings. Pt taking Effient. Dr. Dede Marin is patient cleared?

## 2023-03-10 ENCOUNTER — HOSPITAL ENCOUNTER (OUTPATIENT)
Dept: ULTRASOUND IMAGING | Age: 68
Discharge: HOME | End: 2023-03-10
Payer: COMMERCIAL

## 2023-03-10 DIAGNOSIS — I10 PRIMARY HYPERTENSION: ICD-10-CM

## 2023-03-10 DIAGNOSIS — I71.40 ABDOMINAL AORTIC ANEURYSM (AAA) WITHOUT RUPTURE, UNSPECIFIED PART: ICD-10-CM

## 2023-03-10 DIAGNOSIS — R06.02 SHORTNESS OF BREATH: ICD-10-CM

## 2023-03-10 PROCEDURE — 76775 US EXAM ABDO BACK WALL LIM: CPT

## 2023-03-14 ENCOUNTER — PROCEDURE VISIT (OUTPATIENT)
Dept: UROLOGY | Age: 68
End: 2023-03-14
Payer: COMMERCIAL

## 2023-03-14 VITALS — WEIGHT: 206 LBS | HEIGHT: 75 IN | BODY MASS INDEX: 25.61 KG/M2 | RESPIRATION RATE: 17 BRPM

## 2023-03-14 DIAGNOSIS — N13.8 BPH WITH OBSTRUCTION/LOWER URINARY TRACT SYMPTOMS: ICD-10-CM

## 2023-03-14 DIAGNOSIS — N40.1 BPH WITH OBSTRUCTION/LOWER URINARY TRACT SYMPTOMS: ICD-10-CM

## 2023-03-14 DIAGNOSIS — R97.20 ELEVATED PSA: Primary | ICD-10-CM

## 2023-03-14 PROCEDURE — 76872 US TRANSRECTAL: CPT | Performed by: UROLOGY

## 2023-03-14 PROCEDURE — 96372 THER/PROPH/DIAG INJ SC/IM: CPT | Performed by: UROLOGY

## 2023-03-14 PROCEDURE — 55700 PR PROSTATE NEEDLE BIOPSY ANY APPROACH: CPT | Performed by: UROLOGY

## 2023-03-14 PROCEDURE — 52000 CYSTOURETHROSCOPY: CPT | Performed by: UROLOGY

## 2023-03-14 RX ORDER — GENTAMICIN SULFATE 40 MG/ML
80 INJECTION, SOLUTION INTRAMUSCULAR; INTRAVENOUS ONCE
Status: COMPLETED | OUTPATIENT
Start: 2023-03-14 | End: 2023-03-14

## 2023-03-14 RX ADMIN — GENTAMICIN SULFATE 80 MG: 40 INJECTION, SOLUTION INTRAMUSCULAR; INTRAVENOUS at 08:46

## 2023-03-14 NOTE — PROGRESS NOTES
Procedure: Trus/Biopsy  Pt name and birth date verified Yes  Patient agrees Dr. Jose Vargas is taking biopsies of the prostate Yes  Patient took Enema 2 hours prior to procedure Yes  Patient took 2 pill(s) of cipro day before procedure, day of, and will the day after Yes  Has patient eaten today? Yes  Patient stopped all blood thinners prior to surgery Yes     Patient has given me verbal consent to perform Gentamicin Injection  Yes    Following Dr. Kelley Brooks of care. Gentamicin 80MG/2ML GIVEN I.M right UOQ HIP  Lot Number: [id number]  Expiration Date: 02/29/2024  [facility] #: [id number]    Gentamicin supplied by office.

## 2023-03-14 NOTE — PROGRESS NOTES
Cystoscopy    Operative Note    Patient:  Sherryle Handler  MRN: [id number]  YOB: 1955    Date: 03/14/23  Surgeon: Buffy Jose MD  Anesthesia: Urojet Local  Indications:     Elevated PSA- prostate mri PIRADS-2. Hx of biopsy in past in 2015. Repeat random biopsy prior to any outlet sx  BPH- minimal improvement with flomax. 80 g prostate. Urinary retention on mri with some abdominal discomfort. Interested in further therapies for this. ED/low libido- start sildenafil 50 mg. Will address low libido at a diff date. Cysto and random 12 core prostate biopsy. If no cancer, likely outlet surgery      Position: Supine  EBL: 0 ml    Findings:   The patient was prepped and draped in the usual sterile fashion. The flexible cystoscope was advanced through the urethra and into the bladder. The bladder was thoroughly inspected and the following was noted:    Residual Urine: significant\" \" . Urine clear, with no obvious infection  Urethra: No abnormalities of the urethra are noted. Urethral dilation was not performed. Prostate: lateral lobe hypertrophy ++ present, prostate obstructing, intravesical extension of prostate was present. (50) There was no previous TURP defect. Bladder: no tumor noted . Moderate trabeculation noted. small bladder diverticulum. Ureters: Orifices with normal configuration and location. The cystoscope was removed. The patient tolerated the procedure well. Mr. Faiza Rodriguez is being seen in follow up for his prostate biopsy. The biopsy was done without difficulty or complication. TRUS prostate biopsy note:  After obtaining informed consent, the rectal ultrasound probe was passed per rectum and the prostate visualized. A local block was performed by instilling 2% lidocaine at the base measurements were taken and the prostate measured for a total volume of 98 cc. At this point, a prostate biopsy was performed.   Two cores were taken at the base, the mid-portion, and the apex of the gland on either side for a total of 12 cores. The rectal probe was removed and there was minimal bleeding. Mr. Juliano Michaud tolerated the procedure well and there were no complications. Prostate size: 98 cc  Cores [id number]  Complications: none      Assessment:      Prostate biopsy performed without difficulty. Plan:        I will see Lisseth Omer back to discuss the pathology in 1-2 weeks. Further recommendations will be based on these results.

## 2023-03-15 ENCOUNTER — OFFICE VISIT (OUTPATIENT)
Dept: CARDIOLOGY CLINIC | Age: 68
End: 2023-03-15
Payer: COMMERCIAL

## 2023-03-15 VITALS
BODY MASS INDEX: 25.61 KG/M2 | HEART RATE: 61 BPM | HEIGHT: 75 IN | WEIGHT: 206 LBS | DIASTOLIC BLOOD PRESSURE: 80 MMHG | SYSTOLIC BLOOD PRESSURE: 138 MMHG

## 2023-03-15 DIAGNOSIS — E78.01 FAMILIAL HYPERCHOLESTEROLEMIA: ICD-10-CM

## 2023-03-15 DIAGNOSIS — I10 PRIMARY HYPERTENSION: Primary | ICD-10-CM

## 2023-03-15 DIAGNOSIS — I25.119 CORONARY ARTERY DISEASE WITH ANGINA PECTORIS, UNSPECIFIED VESSEL OR LESION TYPE, UNSPECIFIED WHETHER NATIVE OR TRANSPLANTED HEART (HCC): ICD-10-CM

## 2023-03-15 PROCEDURE — 93000 ELECTROCARDIOGRAM COMPLETE: CPT | Performed by: NUCLEAR MEDICINE

## 2023-03-15 PROCEDURE — 3075F SYST BP GE 130 - 139MM HG: CPT | Performed by: NUCLEAR MEDICINE

## 2023-03-15 PROCEDURE — 3079F DIAST BP 80-89 MM HG: CPT | Performed by: NUCLEAR MEDICINE

## 2023-03-15 PROCEDURE — 99214 OFFICE O/P EST MOD 30 MIN: CPT | Performed by: NUCLEAR MEDICINE

## 2023-03-15 PROCEDURE — 1123F ACP DISCUSS/DSCN MKR DOCD: CPT | Performed by: NUCLEAR MEDICINE

## 2023-03-15 RX ORDER — PRASUGREL 10 MG/1
TABLET, FILM COATED ORAL
Qty: 90 TABLET | Refills: 3 | Status: SHIPPED | OUTPATIENT
Start: 2023-03-15

## 2023-03-15 RX ORDER — SILDENAFIL 50 MG/1
50 TABLET, FILM COATED ORAL DAILY PRN
Qty: 12 TABLET | Refills: 0 | Status: SHIPPED | OUTPATIENT
Start: 2023-03-15

## 2023-03-15 NOTE — PROGRESS NOTES
Jacindaien [address] [place] [address] ST.  [address]  [facility] [address]  Dept: [phone]  Dept Fax: [phone]  Loc: [phone]    Visit Date: 3/15/2023    Vernell Calle is a 77 y.o. male who presents todayfor:  Chief Complaint   Patient presents with    1 Year Follow Up    Coronary Artery Disease    Hypertension    Hyperlipidemia   As above  Know AAA  Last was 4.3 cm   Some chest pain at times  Some dyspnea with exertion   Higher risk   Known CABG and stents  BP is stable  No dizziness  No syncope  On statins for hyperlipidemia        HPI:  HPI  Past Medical History:   Diagnosis Date    Bleeding tendency [facility])     Patient states \"From medications\"    CAD (coronary artery disease)     Chest pain     COVID-19 12/2020    Fatigue     HLD (hyperlipidemia)     Hypertension     MI (myocardial infarction) ([address]     Snores     SOB (shortness of breath)       Past Surgical History:   Procedure Laterality Date    CARDIAC CATHETERIZATION  4-21-08    Significant vasculopathy w/ severe disease in the coronary arteries, pretty much aneurysmal and diffuse in nature everywhere, involving pretty much every segment of coronary arteries. Critical stenosis of the left circ. which is likely to be culprit vessel. Significant aortic disease as well. Mild LV dysfunction. CARDIOVASCULAR STRESS TEST  4-01-11    Moderate inferolateral infarct w/o obvious significant stress induced ischemia. EF 58%. Mild wall motion abnormality. CORONARY ANGIOPLASTY WITH STENT PLACEMENT  6-27-13    CORONARY ANGIOPLASTY WITH STENT PLACEMENT  10/30/2016    3 stent by Dr Raven Monahan ECHOCARDIOGRAM  3-31-11    LV size normal. Systolic function mildly reduced. EF 50-55%. Mild diffuse hypokinesis. Wall thickness mildly increased. Mild concentric LVH.     TRANSTHORACIC ECHOCARDIOGRAM  4-21-08 Global LV dysfunction. EF 40%. Calcified AV w/o obvious stenosis. Mild MR and TR. Dilated ascending aorta.     Family History   Problem Relation Age of Onset    Hypertension Father     Stroke Father     Heart Disease Mother     Kidney Disease Mother      Social History     Tobacco Use    Smoking status: Former     Packs/day: 2.00     Years: 39.00     Pack years: 78.00     Types: Cigarettes     Quit date: 2008     Years since quittin.9    Smokeless tobacco: Never   Substance Use Topics    Alcohol use: No     Alcohol/week: 0.0 standard drinks      Current Outpatient Medications   Medication Sig Dispense Refill    tamsulosin (FLOMAX) 0.4 MG capsule Take 1 capsule by mouth daily 90 capsule 3    prasugrel (EFFIENT) 10 MG TABS TAKE ONE TABLET BY MOUTH ONE TIME A DAY 90 tablet 0    atorvastatin (LIPITOR) 40 MG tablet TAKE ONE TABLET BY MOUTH EVERY OTHER DAY 45 tablet 3    lisinopril (PRINIVIL;ZESTRIL) 10 MG tablet TAKE 1 TABLET BY MOUTH 2 TIMES A  tablet 3    metoprolol tartrate (LOPRESSOR) 25 MG tablet TAKE 1 TABLET BY MOUTH 2 TIMES A  tablet 3    sildenafil (VIAGRA) 50 MG tablet Take 1 tablet by mouth daily as needed for Erectile Dysfunction 6 tablet 0    nitroGLYCERIN (NITROSTAT) 0.4 MG SL tablet Place 1 tablet under the tongue every 5 minutes as needed for Chest pain 25 tablet 3    fluticasone (FLONASE) 50 MCG/ACT nasal spray 1 spray by Each Nostril route daily 3 each 5    Coenzyme Q10 (CO Q 10 PO) Take 200 mg by mouth      CPAP Machine MISC by Does not apply route Please change CPAP pressure to 10 cm H20. 1 each 0    therapeutic multivitamin-minerals (THERAGRAN-M) tablet Take 1 tablet by mouth daily.        aspirin 81 MG EC tablet Take 81 mg by mouth every other day       No current facility-administered medications for this visit.     Allergies   Allergen Reactions    Codeine     Darvon [Propoxyphene Hcl]      Not sure of reaction     Health Maintenance   Topic Date Due    COVID-19 Vaccine (1)  Never done    Pneumococcal 65+ years Vaccine (1 - PCV) Never done    Depression Screen  Never done    Hepatitis C screen  Never done    DTaP/Tdap/Td vaccine (1 - Tdap) Never done    Shingles vaccine (1 of 2) Never done    Flu vaccine (1) 08/01/2022    Lipids  01/13/2024    Prostate Specific Antigen (PSA) Screening or Monitoring  01/13/2024    Colorectal Cancer Screen  05/27/2026    AAA screen  Completed    Hepatitis A vaccine  Aged Out    Hib vaccine  Aged Out    Meningococcal (ACWY) vaccine  Aged Out    Low dose CT lung screening  Discontinued       Subjective:  General:   No fever, no chills, some fatigue or weight loss  Pulmonary:    some dyspnea, no wheezing  Cardiac:    Denies recent chest pain,   GI:     No nausea or vomiting, no abdominal pain  Neuro:    some dizziness or light headedness,   Musculoskeletal:  No recent active issues  Extremities:   No edema, no obvious claudication       Objective:  General:   Well developed, well nourished  Lungs:   Clear to auscultation  Heart:    Normal S1 S2, Slight murmur. no rubs, no gallops  Abdomen:   Soft, non tender, no organomegalies, positive bowel sounds  Extremities:   No edema, no cyanosis, good peripheral pulses  Neurological:   Awake, alert, oriented. No obvious focal deficits  Musculoskelatal:  No obvious deformities   /80   Pulse 61   Ht 6' 3\" (1.905 m)   Wt 206 lb (93.4 kg)   BMI 25.75 kg/m²     Assessment:      Diagnosis Orders   1. Primary hypertension        2. Coronary artery disease with angina pectoris, unspecified vessel or lesion type, unspecified whether native or transplanted heart (HCC)  EKG 12 Lead      3. Familial hypercholesterolemia        As above  Higher risk patient  Higher risk for CAD  Symptoms as above  ECG in office was done today. I reviewed the ECG. No acute findings      Plan:  No follow-ups on file. Discussed  ? ?  Cath   Vs close monitoring   Continue risk factor modification and medical management  'Thank you for allowing me to participate in the care of your patient. Please don't hesitate to contact me regarding any further issues related to the patient care    Orders Placed:  Orders Placed This Encounter   Procedures    EKG 12 Lead     Order Specific Question:   Reason for Exam?     Answer: Other       Prescribed:  No orders of the defined types were placed in this encounter. Discussed use, benefit, and side effects of prescribed medications. All patient questions answered. Pt voicedunderstanding. Instructed to continue current medications, diet and exercise. Continue risk factor modification and medical management. Patient agreed with treatment plan. Follow up as directed.     Electronically signedby Shashank Gore MD on 3/15/2023 at 10:33 AM

## 2023-03-21 ENCOUNTER — OFFICE VISIT (OUTPATIENT)
Dept: UROLOGY | Age: 68
End: 2023-03-21
Payer: COMMERCIAL

## 2023-03-21 VITALS — HEIGHT: 75 IN | WEIGHT: 206 LBS | BODY MASS INDEX: 25.61 KG/M2

## 2023-03-21 DIAGNOSIS — N40.1 BPH WITH OBSTRUCTION/LOWER URINARY TRACT SYMPTOMS: ICD-10-CM

## 2023-03-21 DIAGNOSIS — R97.20 ELEVATED PSA: Primary | ICD-10-CM

## 2023-03-21 DIAGNOSIS — N13.8 BPH WITH OBSTRUCTION/LOWER URINARY TRACT SYMPTOMS: ICD-10-CM

## 2023-03-21 PROCEDURE — 1123F ACP DISCUSS/DSCN MKR DOCD: CPT | Performed by: UROLOGY

## 2023-03-21 PROCEDURE — 99214 OFFICE O/P EST MOD 30 MIN: CPT | Performed by: UROLOGY

## 2023-03-21 NOTE — PROGRESS NOTES
gland. Large field of view axial T2 imaging of the prostate gland. Axial T1, axial T1 VIBE dynamic post dunia and axial T1 VIBE dynamic subtracted post dunia images through the prostate gland. Diffusion 50, 800, 1400 and ADC maps through the prostate gland. Axial T1 STAR VIBE post dunia through the pelvis. 3-D images reconstructed on a separate [employer] workstation with MRI TRUS fusion of prostate mass lesions. CONTRAST: 20  mL of ProHance  intravenously. LABORATORY: 1. PSA on 1/13/2023 was 9.49 ng/ml 2. PSA on 4/26/2021 was 9.6 ng/ml 3. PSA on 3/4/2021 was 12.36 ng/ml 4. PSA on 11/20/2015 was 6.7 ng/ml FINDINGS: PROSTATE SIZE: (As measured on [employer] workstation) 1. The prostate gland is markedly enlarged measuring 5.5 x 4.9 x 6.2 cm 2. The prostate volume is 81.94 ml. TRANSITIONAL ZONE: 1. Enlarged transitional zone with multiple nodules that can reflect benign prostatic hypertrophy (PI-RADS 2) CENTRAL ZONE: 1. Unremarkable. PERIPHERAL ZONE: 1. Predominantly T2 hyperintense. 2. There is an area of intermediate T2 signal within the mid peripheral zone that reflects an area of old inflammation or scarring. SEMINAL VESICLES: 1. Predominantly T2 hyperintense without restricted diffusion. This can reflect old inflammatory changes. NEUROVASCULAR BUNDLES: Intact URINARY BLADDER/RECTUM/PELVIC DIAPHRAGM: Unremarkable. PATHOLOGICALLY ENLARGED LYMPH NODES: 1. None. OSSEOUS STRUCTURES: 1. No suspicious osseous lesion OTHER: 1. A 3 x 2.6 cm aneurysm of the distal abdominal aorta. 2. 1.7 cm ectasia of the right common iliac artery. 3. Atherosclerotic disease of the aortoiliac axis. 1. Enlarged transverse zone with multiple nodules as can be seen with benign prostatic hypertrophy (PI-RADS 2) 2. The prostate is markedly enlarged. 3. No significant lymphadenopathy is seen in the visualized pelvis. 4. A 3 x 2.6 cm aneurysm of the distal abdominal aorta. Aortic ultrasound is recommended for further evaluation.        PAST MEDICAL,

## 2023-03-22 ENCOUNTER — TELEPHONE (OUTPATIENT)
Dept: UROLOGY | Age: 68
End: 2023-03-22

## 2023-03-22 DIAGNOSIS — N40.1 BPH WITH OBSTRUCTION/LOWER URINARY TRACT SYMPTOMS: Primary | ICD-10-CM

## 2023-03-22 DIAGNOSIS — Z01.818 PRE-OP TESTING: ICD-10-CM

## 2023-03-22 DIAGNOSIS — N13.8 BPH WITH OBSTRUCTION/LOWER URINARY TRACT SYMPTOMS: Primary | ICD-10-CM

## 2023-03-22 NOTE — TELEPHONE ENCOUNTER
Patient is scheduled with Dr. Jimmie Ovalles on 4/28/2023. Surgery consent to be done upon arrival.  Dr. Hannah Simmons to clear. Patient to do urine culture and fasting labs on 4/14/2023; orders mailed to patient. Surgery instructions mailed to patient. Patient will have an adult over the age of 25 with them at discharge and 24 hours after procedure.       [facility]#[id number]

## 2023-03-22 NOTE — TELEPHONE ENCOUNTER
SURGERY 8[address]  [address] [place] [address] [address] PENNY TAFOYA AM OFFENEGG II.LISA, Lashell Phelps [address]      Phone *[phone] *[phone]   Surgical Scheduling Direct Line Phone *[phone] Fax *[phone]      Flor Thompson 1955 male    [address] [place] [address]   Marital Status:          Home Phone: [phone]      Cell Phone:    Telephone Information:   Mobile [phone]          Surgeon: Dr. Scout Moss Surgery Date: 4/28/2023   Time: 1:30pm    Procedure: Cystoscopy, Greenlight Photo Vaporization of Prostate    Diagnosis: BPH with obstruction     Important Medical History:  In Epic    Special Inst/Equip:     CPT Codes:    72802  Latex Allergy: No     Cardiac Device:  No    Anesthesia:  General          Admission Type:  Same Day                        Admit Prior to Day of Surgery: No    Case Location:  Main OR            Preadmission Testing:  Phone Call          PAT Date and Time:______________________________________________________    PAT Confirmation #: ______________________________________________________    Post Op Visit: ___________________________________________________________    Need Preop Cardiac Clearance: Yes    Does Patient have Cardiologist/physician?      Dr. Teodoro Stephen    Surgery Confirmation #: __________________________________________________    Peter Poke: ________________________   Date: __________________________     [employer] Name: Bree Fall

## 2023-03-22 NOTE — TELEPHONE ENCOUNTER
Patient is scheduled for a Cystoscopy, Greenlight Photo Vaporization of Prostate with Dr. Cecille Goldsmith on 4/28/2023. We are requesting clearance from Dr. Isael Lindsey. Thank you.

## 2023-03-22 NOTE — TELEPHONE ENCOUNTER
DO NOT TAKE FISH OIL, IBUPROFEN, MOTRIN-LIKE DRUGS AND ANY MULTIVITAMINS OR OVER THE COUNTER SUPPLEMENTS 14 DAYS PRIOR TO SURGERY. HOLD EFFIENT (PRASUGREL) FOR 3 DAYS PRIOR TO SURGERY. HOLD ASPIRIN FOR 5 DAYS PRIOR TO SURGERY. MUST HAVE AN ADULT OVER THE AGE OF 18 WITH YOU AT THE TIME OF THE PROCEDURE AND WITH YOU AT HOME AFTER THE PROCEDURE FOR 24 HOURS       Rika Bernstein 1955 Diagnosis:     Surgical Physician: Dr. Jacobo Juarez have been scheduled for the procedure marked below:      Surgery: Cystoscopy, Greenlight Photo Vaporization of Prostate         Date: 4/28/2023     Anesthesia: Anesthesiologist (General/Spinal)     Place of Service: [facility] Second Floor Same Day Surgery         Arrive to same day surgery by:  11:30am  (Surgery time is subject to change)      INSTRUCTIONS AS MARKED BELOW:    1.  DO NOT eat or drink anything after midnight before surgery. 2.  We prefer you shower or bathe with an antibacterial soap (Dial) the morning of surgery. 3  Please bring a current medication list, photo ID and insurance card(s) with you  4. Okay to take Tylenol  5. If you take Glucophage, Metformin or Janumet, hold 48-hours prior to surgery  6. Take blood pressure or heart medication as directed, if taken in the morning take with a small sip of water  7. The office will call you in 1-2 days after your procedure to schedule a follow up. DATE SENSITIVE TESTING-DO ON THE DATE LISTED*WALK IN * NO APPOINTMENT NEEDED    DO URINE CULTURE AND FASTING LABS ON 4/14/2023; ORDERS INCLUDED.         Date: 3/22/2023

## 2023-03-31 ENCOUNTER — PREP FOR PROCEDURE (OUTPATIENT)
Dept: UROLOGY | Age: 68
End: 2023-03-31

## 2023-03-31 ENCOUNTER — TELEPHONE (OUTPATIENT)
Dept: UROLOGY | Age: 68
End: 2023-03-31

## 2023-04-03 ENCOUNTER — NURSE ONLY (OUTPATIENT)
Dept: LAB | Age: 68
End: 2023-04-03

## 2023-04-03 DIAGNOSIS — Z01.818 PRE-OP TESTING: ICD-10-CM

## 2023-04-03 DIAGNOSIS — N40.1 BPH WITH OBSTRUCTION/LOWER URINARY TRACT SYMPTOMS: ICD-10-CM

## 2023-04-03 DIAGNOSIS — N13.8 BPH WITH OBSTRUCTION/LOWER URINARY TRACT SYMPTOMS: ICD-10-CM

## 2023-04-03 LAB
ANION GAP SERPL CALC-SCNC: 8 MEQ/L (ref 8–16)
BASOPHILS ABSOLUTE: 0 THOU/MM3 (ref 0–0.1)
BASOPHILS NFR BLD AUTO: 0.5 %
BUN SERPL-MCNC: 20 MG/DL (ref 7–22)
CALCIUM SERPL-MCNC: 9.6 MG/DL (ref 8.5–10.5)
CHLORIDE SERPL-SCNC: 106 MEQ/L (ref 98–111)
CO2 SERPL-SCNC: 28 MEQ/L (ref 23–33)
CREAT SERPL-MCNC: 0.8 MG/DL (ref 0.4–1.2)
DEPRECATED RDW RBC AUTO: 43.9 FL (ref 35–45)
EOSINOPHIL NFR BLD AUTO: 1.2 %
EOSINOPHILS ABSOLUTE: 0.1 THOU/MM3 (ref 0–0.4)
ERYTHROCYTE [DISTWIDTH] IN BLOOD BY AUTOMATED COUNT: 12.9 % (ref 11.5–14.5)
GFR SERPL CREATININE-BSD FRML MDRD: > 60 ML/MIN/1.73M2
GLUCOSE SERPL-MCNC: 106 MG/DL (ref 70–108)
HCT VFR BLD AUTO: 46.6 % (ref 42–52)
HGB BLD-MCNC: 15.9 GM/DL (ref 14–18)
IMM GRANULOCYTES # BLD AUTO: 0.02 THOU/MM3 (ref 0–0.07)
IMM GRANULOCYTES NFR BLD AUTO: 0.3 %
LYMPHOCYTES ABSOLUTE: 1.5 THOU/MM3 (ref 1–4.8)
LYMPHOCYTES NFR BLD AUTO: 26.9 %
MCH RBC QN AUTO: 31.7 PG (ref 26–33)
MCHC RBC AUTO-ENTMCNC: 34.1 GM/DL (ref 32.2–35.5)
MCV RBC AUTO: 92.8 FL (ref 80–94)
MONOCYTES ABSOLUTE: 0.7 THOU/MM3 (ref 0.4–1.3)
MONOCYTES NFR BLD AUTO: 12.9 %
NEUTROPHILS NFR BLD AUTO: 58.2 %
NRBC BLD AUTO-RTO: 0 /100 WBC
PLATELET # BLD AUTO: 171 THOU/MM3 (ref 130–400)
PMV BLD AUTO: 10.1 FL (ref 9.4–12.4)
POTASSIUM SERPL-SCNC: 4.6 MEQ/L (ref 3.5–5.2)
RBC # BLD AUTO: 5.02 MILL/MM3 (ref 4.7–6.1)
SEGMENTED NEUTROPHILS ABSOLUTE COUNT: 3.3 THOU/MM3 (ref 1.8–7.7)
SODIUM SERPL-SCNC: 142 MEQ/L (ref 135–145)
WBC # BLD AUTO: 5.7 THOU/MM3 (ref 4.8–10.8)

## 2023-04-03 RX ORDER — SODIUM CHLORIDE 0.9 % (FLUSH) 0.9 %
5-40 SYRINGE (ML) INJECTION EVERY 12 HOURS SCHEDULED
Status: CANCELLED | OUTPATIENT
Start: 2023-04-03

## 2023-04-03 RX ORDER — SODIUM CHLORIDE 9 MG/ML
INJECTION, SOLUTION INTRAVENOUS PRN
Status: CANCELLED | OUTPATIENT
Start: 2023-04-03

## 2023-04-03 RX ORDER — SODIUM CHLORIDE 0.9 % (FLUSH) 0.9 %
5-40 SYRINGE (ML) INJECTION PRN
Status: CANCELLED | OUTPATIENT
Start: 2023-04-03

## 2023-04-04 LAB
BACTERIA UR CULT: ABNORMAL
ORGANISM: ABNORMAL

## 2023-04-05 ENCOUNTER — TELEPHONE (OUTPATIENT)
Dept: UROLOGY | Age: 68
End: 2023-04-05

## 2023-04-05 RX ORDER — CEPHALEXIN 500 MG/1
500 CAPSULE ORAL 2 TIMES DAILY
Qty: 14 CAPSULE | Refills: 0 | Status: SHIPPED | OUTPATIENT
Start: 2023-04-05 | End: 2023-04-12

## 2023-04-05 NOTE — TELEPHONE ENCOUNTER
Patient's urine culture with growth of contaminants. Will treat with Keflex BID for 7 days. Advise to take with food and drink plenty of water. Prescription sent.

## 2023-04-05 NOTE — TELEPHONE ENCOUNTER
Patient wife advised of the urine results and keflex was sent to the pharmacy. She voiced understanding.

## 2023-04-05 NOTE — TELEPHONE ENCOUNTER
Please review urine culture on 4/3/2023. Surgery with DR Janell Denton on 4/15/23 for a GREENLIGHT Thanks.

## 2023-04-05 NOTE — TELEPHONE ENCOUNTER
Attempted to call the patient. Voicemail left stating prescription was sent to the pharmacy and to return the call to the office to confirm message was received.

## 2023-04-15 ENCOUNTER — HOSPITAL ENCOUNTER (OUTPATIENT)
Age: 68
Setting detail: OUTPATIENT SURGERY
Discharge: HOME | End: 2023-04-15
Attending: UROLOGY | Admitting: UROLOGY
Payer: COMMERCIAL

## 2023-04-15 VITALS
HEART RATE: 55 BPM | DIASTOLIC BLOOD PRESSURE: 73 MMHG | RESPIRATION RATE: 20 BRPM | BODY MASS INDEX: 25.64 KG/M2 | WEIGHT: 206.2 LBS | SYSTOLIC BLOOD PRESSURE: 125 MMHG | HEIGHT: 75 IN | OXYGEN SATURATION: 95 % | TEMPERATURE: 97 F

## 2023-04-15 DIAGNOSIS — G89.18 POSTOPERATIVE PAIN: Primary | ICD-10-CM

## 2023-04-15 PROCEDURE — 3700000000 HC ANESTHESIA ATTENDED CARE: Performed by: UROLOGY

## 2023-04-15 PROCEDURE — 3700000001 HC ADD 15 MINUTES (ANESTHESIA): Performed by: UROLOGY

## 2023-04-15 PROCEDURE — 2709999900 HC NON-CHARGEABLE SUPPLY: Performed by: UROLOGY

## 2023-04-15 PROCEDURE — 2720000010 HC SURG SUPPLY STERILE: Performed by: UROLOGY

## 2023-04-15 PROCEDURE — 7100000011 HC PHASE II RECOVERY - ADDTL 15 MIN: Performed by: UROLOGY

## 2023-04-15 PROCEDURE — 3600000013 HC SURGERY LEVEL 3 ADDTL 15MIN: Performed by: UROLOGY

## 2023-04-15 PROCEDURE — 7100000010 HC PHASE II RECOVERY - FIRST 15 MIN: Performed by: UROLOGY

## 2023-04-15 PROCEDURE — 2580000003 HC RX 258: Performed by: UROLOGY

## 2023-04-15 PROCEDURE — 6360000002 HC RX W HCPCS: Performed by: ANESTHESIOLOGY

## 2023-04-15 PROCEDURE — 7100000001 HC PACU RECOVERY - ADDTL 15 MIN: Performed by: UROLOGY

## 2023-04-15 PROCEDURE — 3600000003 HC SURGERY LEVEL 3 BASE: Performed by: UROLOGY

## 2023-04-15 PROCEDURE — 7100000000 HC PACU RECOVERY - FIRST 15 MIN: Performed by: UROLOGY

## 2023-04-15 RX ORDER — SODIUM CHLORIDE 9 MG/ML
INJECTION, SOLUTION INTRAVENOUS PRN
Status: DISCONTINUED | OUTPATIENT
Start: 2023-04-15 | End: 2023-04-15 | Stop reason: HOSPADM

## 2023-04-15 RX ORDER — METOCLOPRAMIDE HYDROCHLORIDE 5 MG/ML
10 INJECTION INTRAMUSCULAR; INTRAVENOUS
Status: DISCONTINUED | OUTPATIENT
Start: 2023-04-15 | End: 2023-04-15 | Stop reason: HOSPADM

## 2023-04-15 RX ORDER — MEPERIDINE HYDROCHLORIDE 25 MG/ML
12.5 INJECTION INTRAMUSCULAR; INTRAVENOUS; SUBCUTANEOUS EVERY 4 HOURS PRN
Status: DISCONTINUED | OUTPATIENT
Start: 2023-04-15 | End: 2023-04-15 | Stop reason: HOSPADM

## 2023-04-15 RX ORDER — CEFDINIR 300 MG/1
300 CAPSULE ORAL 2 TIMES DAILY
Qty: 10 CAPSULE | Refills: 0 | Status: SHIPPED | OUTPATIENT
Start: 2023-04-15 | End: 2023-04-20

## 2023-04-15 RX ORDER — HYDROCODONE BITARTRATE AND ACETAMINOPHEN 5; 325 MG/1; MG/1
1 TABLET ORAL EVERY 6 HOURS PRN
Qty: 18 TABLET | Refills: 0 | Status: SHIPPED | OUTPATIENT
Start: 2023-04-15 | End: 2023-04-20

## 2023-04-15 RX ORDER — FENTANYL CITRATE 50 UG/ML
25 INJECTION, SOLUTION INTRAMUSCULAR; INTRAVENOUS EVERY 5 MIN PRN
Status: DISCONTINUED | OUTPATIENT
Start: 2023-04-15 | End: 2023-04-15 | Stop reason: HOSPADM

## 2023-04-15 RX ORDER — FENTANYL CITRATE 50 UG/ML
50 INJECTION, SOLUTION INTRAMUSCULAR; INTRAVENOUS EVERY 5 MIN PRN
Status: DISCONTINUED | OUTPATIENT
Start: 2023-04-15 | End: 2023-04-15 | Stop reason: HOSPADM

## 2023-04-15 RX ORDER — SODIUM CHLORIDE 0.9 % (FLUSH) 0.9 %
5-40 SYRINGE (ML) INJECTION EVERY 12 HOURS SCHEDULED
Status: DISCONTINUED | OUTPATIENT
Start: 2023-04-15 | End: 2023-04-15 | Stop reason: HOSPADM

## 2023-04-15 RX ORDER — DIPHENHYDRAMINE HYDROCHLORIDE 50 MG/ML
12.5 INJECTION INTRAMUSCULAR; INTRAVENOUS
Status: DISCONTINUED | OUTPATIENT
Start: 2023-04-15 | End: 2023-04-15 | Stop reason: HOSPADM

## 2023-04-15 RX ORDER — SODIUM CHLORIDE 0.9 % (FLUSH) 0.9 %
5-40 SYRINGE (ML) INJECTION PRN
Status: DISCONTINUED | OUTPATIENT
Start: 2023-04-15 | End: 2023-04-15 | Stop reason: HOSPADM

## 2023-04-15 RX ADMIN — MEPERIDINE HYDROCHLORIDE 12.5 MG: 25 INJECTION, SOLUTION INTRAMUSCULAR; INTRAVENOUS; SUBCUTANEOUS at 13:22

## 2023-04-15 RX ADMIN — SODIUM CHLORIDE: 9 INJECTION, SOLUTION INTRAVENOUS at 10:03

## 2023-04-15 ASSESSMENT — PAIN DESCRIPTION - ORIENTATION: ORIENTATION: INNER

## 2023-04-15 ASSESSMENT — PAIN DESCRIPTION - LOCATION: LOCATION: PENIS

## 2023-04-15 ASSESSMENT — PAIN SCALES - GENERAL
PAINLEVEL_OUTOF10: 0
PAINLEVEL_OUTOF10: 4
PAINLEVEL_OUTOF10: 0
PAINLEVEL_OUTOF10: 0

## 2023-04-15 ASSESSMENT — PAIN - FUNCTIONAL ASSESSMENT: PAIN_FUNCTIONAL_ASSESSMENT: NONE - DENIES PAIN

## 2023-04-15 ASSESSMENT — PAIN DESCRIPTION - PAIN TYPE: TYPE: SURGICAL PAIN

## 2023-04-15 ASSESSMENT — PAIN DESCRIPTION - DESCRIPTORS: DESCRIPTORS: BURNING

## 2023-04-15 NOTE — PROGRESS NOTES
1352  pt. Awake and oriented to phase II. Pt. Denies pain. CBI intact and draining clear. Wife at bedside. Call light in reach. 1400  pt. Eating and drinking without difficulty. 1430  shaikh education done. 1500  discharge instructions given. Pt. Willis Sargent understanding. 1515  phase II criteria met. Pt. Discharged per wheelchair with nurse. Pt. Stable and tolerated well.

## 2023-04-15 NOTE — PROGRESS NOTES
1318: Pt arrives to pacu, awakens to verbal stimuli. Pt on room air, respirations easy and unlabored. CBI in place, emptied 550mls from catheter bag. 2700 remaining in bag 1, 3000 in bag 2. VSS  1322: 12.5mg of demerol administered for shivering  1340: Emptied 750mls from catheter bag. 2200 remaining in bag 1 and 2800 in bag 2.  1350: Pt transported to hospitals in stable condition.  VSS

## 2023-04-15 NOTE — BRIEF OP NOTE
Brief Postoperative Note      Patient: Casey Harley  YOB: 1955  MRN: [id number]    Date of Procedure: 4/15/2023    Pre-Op Diagnosis Codes:     * BPH with urinary obstruction [N40.1, N13.8]    Post-Op Diagnosis: Same       Procedure(s):  Cystoscopy Greenlight Photo Vaporization    Surgeon(s):  Neo Armstrong MD    Assistant:  * No surgical staff found *    Anesthesia: General    Estimated Blood Loss (mL): Minimal    Complications: None    Specimens:   * No specimens in log *    Implants:  * No implants in log *      Drains:   Urinary Catheter 04/15/23 3 Way (Active)   $ Urethral catheter insertion Inserted for procedure 04/15/23 1352   Catheter Indications Urinary retention (acute or chronic), continuous bladder irrigation or bladder outlet obstruction 04/15/23 1352   Site Assessment No urethral drainage 04/15/23 1345   Urine Color Colorless 04/15/23 1352   Urine Appearance Clear 04/15/23 1352   Collection Container Standard 04/15/23 1352   Catheter Best Practices  Bag below bladder 04/15/23 1352   Status Draining 04/15/23 1352       Findings: shaikh out Monday. Mae 3 weeks.  Harper Prince four months      Electronically signed by Jhonathan Durham MD on 4/15/2023 at 2:16 PM

## 2023-04-15 NOTE — H&P
Margy Vargas MD  History and Physical    Patient:  Dakota Capellan  MRN: [id number]  YOB: 1955    HISTORY OF PRESENT ILLNESS:     The patient is a 77 y.o. male who presents with bph. Here for procedure. Patient's old records, notes and chart reviewed and summarized above. Margy Vargas MD independently reviewed the images and verified the radiology reports from:    [address] [place] [address]    Result Date: 3/10/2023  PROCEDURE: US ABDOMINAL AORTA LIMITED CLINICAL INFORMATION: Abdominal aortic aneurysm (AAA) without rupture, unspecified part, Primary hypertension, Shortness of breath COMPARISON: No prior study. TECHNIQUE: Multiple grayscale and color flow sonographic images of the abdominal aorta and common iliac arteries were obtained. Spectral Doppler waveforms were also generated for each of these vessels. FINDINGS: Fusiform aneurysm of the distal abdominal aorta, 4.3 cm in diameter. No para-aortic mass lesion or fluid collection is seen. There is no dissection. . Proximal Abdominal Aorta Trans - 2.42 cm AP - 2.22 cm Mid Abdominal Aorta Trans - 1.59 cm AP - 2.35 cm Distal Abdominal Aorta Trans - 3.22 cm AP - 4.31 cm Right Common Iliac Artery Trans = 1.40 cm AP = 1.52 cm Left Common Iliac Artery Trans - 1.27 cm AP - 0.82 cm     Fusiform aneurysm distal abdominal aorta. **This report has been created using voice recognition software. It may contain minor errors which are inherent in voice recognition technology. ** Final report electronically signed by Dr. Bhumi Leahy on 3/10/2023 12:57 PM        Past Medical History:    Past Medical History:   Diagnosis Date    Bleeding tendency [facility])     Patient states \"From medications\"    CAD (coronary artery disease)     Chest pain     COVID-19 12/2020    Fatigue     HLD (hyperlipidemia)     Hypertension     MI (myocardial infarction) ([facility]     Snores     SOB (shortness of breath)        Past Surgical History:    Past Surgical History:   Procedure

## 2023-04-15 NOTE — DISCHARGE INSTRUCTIONS
-OK to dc home.  -Home with shaikh; you may see some blood in urine in the tubing and this is normal as long as the catheter is draining well  -If catheter does not draining, call the office or report to the ER  -Stay well hydrated  -No heavy lifting >20lbs for 6 weeks  -You may see blood with urination on and off for 4 weeks, this is normal and will improve. The most important thing is if the catheter is draining and you are able to empty your bladder.  -You may have burning with urination on and off for 2-3 weeks, this is normal and should improve  - Report to the ER if chest pain, shortness of breath, fever >101.5  - You may take tylenol or motrin OTC as needed for pain  - Take antibiotics as prescribed the day before catheter removal  - Make sure you take stool softeners so that you are not straining during bowel movements    Pt will Follow up with Oscar Alarcon MD for a void trial. Call office for follow up. You may resume your blood thinners in 72 hours.

## 2023-04-17 ENCOUNTER — NURSE ONLY (OUTPATIENT)
Dept: UROLOGY | Age: 68
End: 2023-04-17

## 2023-04-17 DIAGNOSIS — N13.8 BPH WITH OBSTRUCTION/LOWER URINARY TRACT SYMPTOMS: Primary | ICD-10-CM

## 2023-04-17 DIAGNOSIS — N40.1 BPH WITH OBSTRUCTION/LOWER URINARY TRACT SYMPTOMS: Primary | ICD-10-CM

## 2023-04-17 PROCEDURE — 99999 PR OFFICE/OUTPT VISIT,PROCEDURE ONLY: CPT

## 2023-04-17 NOTE — PROGRESS NOTES
Patient has given me verbal consent to perform shaikh removal  Yes    30 cc of water deflated from shaikh balloon. 22 Fr shaikh removed without difficulty. Foreskin reduced back down? Yes      Pt will drink fluids and report to ER in 6-8 hours if patient unable to urinate. F/u with provider as scheduled.

## 2023-04-17 NOTE — OP NOTE
[address] [place] [address]. [place]    DATE: 4/15/2023  Patient:  Juan David Loredo  MRN: [id number]  YOB: 1955    SURGEON: Phi Rivera MD.    ASSISTANT: none    PREOPERATIVE DIAGNOSIS: BPH with outlet obstruction    POSTOPERATIVE DIAGNOSIS: BPH with outlet obstruction    PROCEDURE PERFORMED:  Cystoscopy, Greenlight Photovaporization of Prostate,        ANESTHESIA: General    COMPLICATIONS: none    OR BLOOD LOSS:  Minimal    FLUIDS: Cystalloids per Anesthesia    SPECIMENS:  * No specimens in log *  none    DRAINS: 22 f 3 way shaikh    INDICATIONS FOR PROCEDURE:  The patient is a 77 y.o. male who presents today with [unfilled] here for Cystoscopy Greenlight Photo Vaporization. After risks, benefits and alternatives of the procedure were discussed with the patient, the patient elected to proceed. DETAILS OF PROCEDURE:  After informed consent was obtained in the preoperative area, the patient was taken back to the operating room and transferred to the operating table in supine position. EPC cuffs were placed. The machine was turned on. Anesthesia was induced and antibiotics were given. The patient was placed in modified dorsal lithotomy position and sterilely prepped and draped in a standard fashion. A timeout occurred. Two patient identifiers were used. The 25F rigid scope with the visual obturator was carefully advanced through the urethra. .  Once within the bladder the visual obturator was exchanged for the resection bridge. the ureteral orifices were very close to the bladder neck    The prostate was surveyed. the lateral lobes were noted to be significantly obstructing. . There was a median lobe present. Enucleation of the median lobe was not performed. The vaporization was started proximal with a power setting of 80W. Both the left and right lateral lobes were vaporized in their entirety down to the level of the surgical capsule up to a power of 180 mercado.

## 2023-05-02 ENCOUNTER — OFFICE VISIT (OUTPATIENT)
Dept: UROLOGY | Age: 68
End: 2023-05-02
Payer: COMMERCIAL

## 2023-05-02 VITALS
SYSTOLIC BLOOD PRESSURE: 118 MMHG | BODY MASS INDEX: 25.61 KG/M2 | HEIGHT: 75 IN | DIASTOLIC BLOOD PRESSURE: 84 MMHG | WEIGHT: 206 LBS

## 2023-05-02 DIAGNOSIS — N13.8 BPH WITH OBSTRUCTION/LOWER URINARY TRACT SYMPTOMS: Primary | ICD-10-CM

## 2023-05-02 DIAGNOSIS — N40.1 BPH WITH OBSTRUCTION/LOWER URINARY TRACT SYMPTOMS: Primary | ICD-10-CM

## 2023-05-02 LAB
BILIRUBIN URINE: NEGATIVE
BLOOD URINE, POC: ABNORMAL
CHARACTER, URINE: CLEAR
COLOR, URINE: YELLOW
GLUCOSE URINE: NEGATIVE MG/DL
KETONES, URINE: NEGATIVE
LEUKOCYTE CLUMPS, URINE: ABNORMAL
NITRITE, URINE: NEGATIVE
PH, URINE: 7 (ref 5–9)
POST VOID RESIDUAL (PVR): 42 ML
PROTEIN, URINE: 30 MG/DL
SPECIFIC GRAVITY, URINE: 1.02 (ref 1–1.03)
UROBILINOGEN, URINE: 0.2 EU/DL (ref 0–1)

## 2023-05-02 PROCEDURE — 99024 POSTOP FOLLOW-UP VISIT: CPT | Performed by: NURSE PRACTITIONER

## 2023-05-02 PROCEDURE — 81003 URINALYSIS AUTO W/O SCOPE: CPT | Performed by: NURSE PRACTITIONER

## 2023-05-02 PROCEDURE — 51798 US URINE CAPACITY MEASURE: CPT | Performed by: NURSE PRACTITIONER

## 2023-05-02 ASSESSMENT — ENCOUNTER SYMPTOMS
NAUSEA: 0
VOMITING: 0
ABDOMINAL PAIN: 0
BACK PAIN: 0

## 2023-05-02 NOTE — PROGRESS NOTES
[facility]) tablet Take 1 tablet by mouth daily      aspirin 81 MG EC tablet Take 1 tablet by mouth every other day       No current facility-administered medications for this visit. Past Medical History  Isabella Coreas  has a past medical history of Bleeding tendency ([address] CAD (coronary artery disease), Chest pain, COVID-19, Fatigue, HLD (hyperlipidemia), Hypertension, MI (myocardial infarction) ([address] Snores, and SOB (shortness of breath). Past Surgical History  The patient  has a past surgical history that includes transthoracic echocardiogram (03/31/2011); Cardiac catheterization (04/21/2008); Coronary artery bypass graft (04/21/2008); Tonsillectomy; hernia repair; knee surgery (1989); cardiovascular stress test (04/01/2011); transthoracic echocardiogram (04/21/2008); Coronary angioplasty with stent (06/27/2013); Coronary angioplasty with stent (10/30/2016); and TURP (N/A, 4/15/2023). Family History  This patient's family history includes Heart Disease in his mother; Hypertension in his father; Kidney Disease in his mother; Stroke in his father. Social History  Isabella Coreas  reports that he quit smoking about 15 years ago. His smoking use included cigarettes. He has never used smokeless tobacco. He reports current alcohol use of about 1.0 standard drink per week. He reports that he does not use drugs. Subjective:      Review of Systems   Constitutional:  Negative for activity change, appetite change, chills, diaphoresis, fatigue, fever and unexpected weight change. Gastrointestinal:  Negative for abdominal pain, nausea and vomiting. Genitourinary:  Positive for frequency, hematuria and urgency. Negative for decreased urine volume, difficulty urinating, dysuria and flank pain. Musculoskeletal:  Negative for back pain. Objective:   /84   Ht 6' 3\" (1.905 m)   Wt 206 lb (93.4 kg)   BMI 25.75 kg/m²     Physical Exam  Vitals reviewed.    Constitutional:       General: He is not in acute

## 2023-05-04 ENCOUNTER — TELEPHONE (OUTPATIENT)
Dept: UROLOGY | Age: 68
End: 2023-05-04

## 2023-05-04 LAB — BACTERIA UR CULT: NORMAL

## 2023-05-04 NOTE — TELEPHONE ENCOUNTER
----- Message from [facility] - CNP sent at 5/4/2023  3:31 PM EDT -----  Please let pt know urine culture negative for growth.

## 2023-06-07 LAB — FASTING: YES

## 2023-06-29 ENCOUNTER — HOSPITAL ENCOUNTER (EMERGENCY)
Age: 68
Discharge: HOME | End: 2023-06-29
Attending: EMERGENCY MEDICINE
Payer: COMMERCIAL

## 2023-06-29 VITALS
OXYGEN SATURATION: 95 % | DIASTOLIC BLOOD PRESSURE: 92 MMHG | RESPIRATION RATE: 16 BRPM | TEMPERATURE: 98.4 F | HEART RATE: 76 BPM | SYSTOLIC BLOOD PRESSURE: 135 MMHG

## 2023-06-29 DIAGNOSIS — R33.9 URINARY RETENTION: Primary | ICD-10-CM

## 2023-06-29 DIAGNOSIS — R31.0 GROSS HEMATURIA: ICD-10-CM

## 2023-06-29 DIAGNOSIS — N39.0 URINARY TRACT INFECTION WITH HEMATURIA, SITE UNSPECIFIED: ICD-10-CM

## 2023-06-29 DIAGNOSIS — R31.9 URINARY TRACT INFECTION WITH HEMATURIA, SITE UNSPECIFIED: ICD-10-CM

## 2023-06-29 LAB
BACTERIA URNS QL MICRO: ABNORMAL /HPF
BILIRUB UR QL STRIP.AUTO: ABNORMAL
CASTS #/AREA URNS LPF: ABNORMAL /LPF
CASTS 2: ABNORMAL /LPF
CHARACTER UR: ABNORMAL
COLOR: ABNORMAL
CRYSTALS URNS MICRO: ABNORMAL
EPITHELIAL CELLS, UA: ABNORMAL /HPF
GLUCOSE UR QL STRIP.AUTO: NEGATIVE MG/DL
HGB UR QL STRIP.AUTO: ABNORMAL
ICTOTEST: NEGATIVE
KETONES UR QL STRIP.AUTO: NEGATIVE
MISCELLANEOUS 2: ABNORMAL
NITRITE UR QL STRIP: NEGATIVE
PH UR STRIP.AUTO: 5.5 [PH] (ref 5–9)
PROT UR STRIP.AUTO-MCNC: 100 MG/DL
RBC URINE: > 200 /HPF
RENAL EPI CELLS #/AREA URNS HPF: ABNORMAL /[HPF]
SP GR UR REFRACT.AUTO: 1.02 (ref 1–1.03)
UROBILINOGEN, URINE: 0.2 EU/DL (ref 0–1)
WBC #/AREA URNS HPF: ABNORMAL /HPF
WBC #/AREA URNS HPF: ABNORMAL /[HPF]
YEAST LIKE FUNGI URNS QL MICRO: ABNORMAL

## 2023-06-29 PROCEDURE — 6370000000 HC RX 637 (ALT 250 FOR IP): Performed by: EMERGENCY MEDICINE

## 2023-06-29 PROCEDURE — 81001 URINALYSIS AUTO W/SCOPE: CPT

## 2023-06-29 PROCEDURE — 87086 URINE CULTURE/COLONY COUNT: CPT

## 2023-06-29 PROCEDURE — 99283 EMERGENCY DEPT VISIT LOW MDM: CPT

## 2023-06-29 PROCEDURE — 51798 US URINE CAPACITY MEASURE: CPT

## 2023-06-29 RX ORDER — CEPHALEXIN 250 MG/1
500 CAPSULE ORAL ONCE
Status: COMPLETED | OUTPATIENT
Start: 2023-06-29 | End: 2023-06-29

## 2023-06-29 RX ORDER — CEPHALEXIN 500 MG/1
500 CAPSULE ORAL 2 TIMES DAILY
Qty: 14 CAPSULE | Refills: 0 | Status: SHIPPED | OUTPATIENT
Start: 2023-06-29 | End: 2023-07-06

## 2023-06-29 RX ADMIN — CEPHALEXIN 500 MG: 250 CAPSULE ORAL at 21:49

## 2023-06-29 ASSESSMENT — ENCOUNTER SYMPTOMS
DIARRHEA: 0
NAUSEA: 0
ANAL BLEEDING: 0
VOMITING: 0
BLOOD IN STOOL: 0
BACK PAIN: 0
ABDOMINAL PAIN: 0

## 2023-06-30 ENCOUNTER — CARE COORDINATION (OUTPATIENT)
Dept: OTHER | Facility: CLINIC | Age: 68
End: 2023-06-30

## 2023-06-30 ENCOUNTER — OFFICE VISIT (OUTPATIENT)
Dept: UROLOGY | Age: 68
End: 2023-06-30

## 2023-06-30 ENCOUNTER — TELEPHONE (OUTPATIENT)
Dept: UROLOGY | Age: 68
End: 2023-06-30

## 2023-06-30 VITALS — RESPIRATION RATE: 17 BRPM | WEIGHT: 206 LBS | HEIGHT: 75 IN | BODY MASS INDEX: 25.61 KG/M2

## 2023-06-30 DIAGNOSIS — R31.9 HEMATURIA, UNSPECIFIED TYPE: ICD-10-CM

## 2023-06-30 DIAGNOSIS — R97.20 ELEVATED PSA: ICD-10-CM

## 2023-06-30 DIAGNOSIS — N40.1 BPH WITH OBSTRUCTION/LOWER URINARY TRACT SYMPTOMS: Primary | ICD-10-CM

## 2023-06-30 DIAGNOSIS — N13.8 BPH WITH OBSTRUCTION/LOWER URINARY TRACT SYMPTOMS: Primary | ICD-10-CM

## 2023-06-30 DIAGNOSIS — N52.9 ERECTILE DYSFUNCTION, UNSPECIFIED ERECTILE DYSFUNCTION TYPE: ICD-10-CM

## 2023-06-30 DIAGNOSIS — R33.9 URINARY RETENTION: ICD-10-CM

## 2023-07-01 LAB
BACTERIA UR CULT: ABNORMAL
ORGANISM: ABNORMAL

## 2023-07-05 ENCOUNTER — CARE COORDINATION (OUTPATIENT)
Dept: OTHER | Facility: CLINIC | Age: 68
End: 2023-07-05

## 2023-07-05 NOTE — CARE COORDINATION
Ambulatory Care Coordination Note    ACM attempted 2nd outreach to patient for introduction to Associate Care Management related to ED visit for hematuria. HIPAA compliant message left requesting a return phone call at patient convenience. Unable to Reach Letter sent to patient via [employer]. Will continue to outreach.       Future Appointments   Date Time Provider [address]   8/1/2023 10:00 AM STR ULTRASOUND RM 2 STRZ US STR Radiolog   8/8/2023  8:45 AM MD Raquel Hoover [facility]   10/10/2023  [facility]   3/13/2024 10:30 AM Dann Morrison  [facility]

## 2023-07-07 LAB
CHOLEST SERPL-MCNC: 155 MG/DL (ref 0–199)
FASTING: YES
GLUCOSE SERPL-MCNC: 106 MG/DL (ref 74–109)
HDLC SERPL-MCNC: 49 MG/DL (ref 40–90)
LDLC SERPL CALC-MCNC: 90 MG/DL
TRIGL SERPL-MCNC: 82 MG/DL (ref 0–199)

## 2023-07-11 ENCOUNTER — CARE COORDINATION (OUTPATIENT)
Dept: OTHER | Facility: CLINIC | Age: 68
End: 2023-07-11

## 2023-07-11 NOTE — CARE COORDINATION
Ambulatory Care Coordination Note    ACM attempted third and final call to patient for introduction to Associate Care Management. HIPAA compliant message left requesting a return phone call at patient convenience. Final Unable to Reach Letter sent to patient via [employer]. Information sent to patient via [employer]:    Nurse Soo  Right Care, Right Place, Right Time  HR apps     No further outreach scheduled with this ACM, patient has been provided with this ACM's contact information. ACM will sign off care team at this time.      Future Appointments   Date Time Provider [address] [place] [address] Ct   8/1/2023 10:00 AM STR ULTRASOUND RM 2 STRZ US STR Radiolog   8/8/2023  8:45 AM MD Mercedes Kelly Uro P - [place]   10/10/2023  9:15 AM GAGAN Monahan Med [facility] - [place]   3/13/2024 10:30 AM Dann Fontana  [address]

## 2023-08-03 ENCOUNTER — HOSPITAL ENCOUNTER (OUTPATIENT)
Dept: ULTRASOUND IMAGING | Age: 68
Discharge: HOME | End: 2023-08-03
Payer: COMMERCIAL

## 2023-08-03 DIAGNOSIS — R31.9 HEMATURIA, UNSPECIFIED TYPE: ICD-10-CM

## 2023-08-03 DIAGNOSIS — R33.9 URINARY RETENTION: ICD-10-CM

## 2023-08-03 DIAGNOSIS — N13.8 BPH WITH OBSTRUCTION/LOWER URINARY TRACT SYMPTOMS: ICD-10-CM

## 2023-08-03 DIAGNOSIS — N40.1 BPH WITH OBSTRUCTION/LOWER URINARY TRACT SYMPTOMS: ICD-10-CM

## 2023-08-03 PROCEDURE — 76770 US EXAM ABDO BACK WALL COMP: CPT

## 2023-08-08 ENCOUNTER — OFFICE VISIT (OUTPATIENT)
Dept: UROLOGY | Age: 68
End: 2023-08-08
Payer: COMMERCIAL

## 2023-08-08 VITALS — WEIGHT: 206 LBS | HEIGHT: 75 IN | RESPIRATION RATE: 16 BRPM | BODY MASS INDEX: 25.61 KG/M2

## 2023-08-08 DIAGNOSIS — N52.9 ERECTILE DYSFUNCTION, UNSPECIFIED ERECTILE DYSFUNCTION TYPE: ICD-10-CM

## 2023-08-08 DIAGNOSIS — N40.1 BPH WITH OBSTRUCTION/LOWER URINARY TRACT SYMPTOMS: Primary | ICD-10-CM

## 2023-08-08 DIAGNOSIS — N13.8 BPH WITH OBSTRUCTION/LOWER URINARY TRACT SYMPTOMS: Primary | ICD-10-CM

## 2023-08-08 DIAGNOSIS — R97.20 ELEVATED PSA: ICD-10-CM

## 2023-08-08 PROCEDURE — 1123F ACP DISCUSS/DSCN MKR DOCD: CPT | Performed by: UROLOGY

## 2023-08-08 PROCEDURE — 99214 OFFICE O/P EST MOD 30 MIN: CPT | Performed by: UROLOGY

## 2023-08-08 NOTE — PROGRESS NOTES
Olympia Carrel MD.    [address]  [address] [address]  Dept: [phone]  Dept Fax: 15 : [facility] -     Patient:  Surinder Whittaker  YOB: 1955    The patient is a 77 y.o. male who presents today for evaluation of the following problems:   Chief Complaint   Patient presents with    Follow-up     4 month post green light     referred/consultation requested by Jason Ramírez MD.    History of Present Illness:    Elevated PSA  Persistently elevated/oscillating  Mri pirads 2  Negative biopsy in 2016 and 2023    BPH  80 g  Had pvp  Auass 9  Did have difficult postoperative course and hematuria postop    ED  stable  Bothered  Has not taken sildenafil but has it prescribed (by dr Ellen Lockhart) on nitro    Low libido  Since open heart surgery        Requested/reviewed records from Jason Ramírez MD office and/or outside physician/EMR    (Patient's old records have been requested, reviewed and pertinent findings summarized in today's note.)    Procedures Today: N/A      Last several PSA's:  Lab Results   Component Value Date    PSA 9.49 (H) 01/13/2023    PSA 12.36 (H) 03/04/2021    PSA 6.70 (H) 11/20/2015       Last total testosterone:  No results found for: TESTOSTERONE    Urinalysis today:  No results found for this visit on 08/08/23.       Last BUN and creatinine:  Lab Results   Component Value Date    BUN 20 04/03/2023     Lab Results   Component Value Date    CREATININE 0.8 04/03/2023         Imaging Reviewed during this Office Visit:   imaging independently reviewed by Kaylen Vargas MD and radiology report verified demonstrating   imaging independently reviewed by Kaylen Vargas MD and radiology report verified demonstrating     US RENAL COMPLETE    Result Date: 8/3/2023  PROCEDURE: US RENAL COMPLETE CLINICAL INFORMATION: BPH with obstruction/lower urinary tract symptoms,

## 2023-09-21 ENCOUNTER — NURSE ONLY (OUTPATIENT)
Dept: LAB | Age: 68
End: 2023-09-21

## 2023-09-21 DIAGNOSIS — R97.20 ELEVATED PSA: ICD-10-CM

## 2023-09-21 LAB — PSA SERPL-MCNC: 6.9 NG/ML (ref 0–1)

## 2023-09-25 ENCOUNTER — OFFICE VISIT (OUTPATIENT)
Dept: FAMILY MEDICINE CLINIC | Age: 68
End: 2023-09-25

## 2023-09-25 ENCOUNTER — TELEPHONE (OUTPATIENT)
Dept: FAMILY MEDICINE CLINIC | Age: 68
End: 2023-09-25

## 2023-09-25 VITALS
HEART RATE: 70 BPM | SYSTOLIC BLOOD PRESSURE: 142 MMHG | WEIGHT: 211.25 LBS | RESPIRATION RATE: 18 BRPM | HEIGHT: 74 IN | BODY MASS INDEX: 27.11 KG/M2 | DIASTOLIC BLOOD PRESSURE: 70 MMHG

## 2023-09-25 DIAGNOSIS — I25.119 CORONARY ARTERY DISEASE WITH ANGINA PECTORIS, UNSPECIFIED VESSEL OR LESION TYPE, UNSPECIFIED WHETHER NATIVE OR TRANSPLANTED HEART (HCC): ICD-10-CM

## 2023-09-25 DIAGNOSIS — Z11.59 NEED FOR HEPATITIS C SCREENING TEST: ICD-10-CM

## 2023-09-25 DIAGNOSIS — I10 ESSENTIAL HYPERTENSION: ICD-10-CM

## 2023-09-25 DIAGNOSIS — Z00.00 INITIAL MEDICARE ANNUAL WELLNESS VISIT: ICD-10-CM

## 2023-09-25 DIAGNOSIS — E78.00 PURE HYPERCHOLESTEROLEMIA: Primary | ICD-10-CM

## 2023-09-25 DIAGNOSIS — J31.0 CHRONIC RHINITIS: ICD-10-CM

## 2023-09-25 DIAGNOSIS — J43.9 PULMONARY EMPHYSEMA, UNSPECIFIED EMPHYSEMA TYPE (HCC): ICD-10-CM

## 2023-09-25 PROCEDURE — 1123F ACP DISCUSS/DSCN MKR DOCD: CPT | Performed by: FAMILY MEDICINE

## 2023-09-25 PROCEDURE — 99397 PER PM REEVAL EST PAT 65+ YR: CPT | Performed by: FAMILY MEDICINE

## 2023-09-25 RX ORDER — LISINOPRIL 10 MG/1
10 TABLET ORAL 2 TIMES DAILY
Qty: 180 TABLET | Refills: 3 | Status: SHIPPED | OUTPATIENT
Start: 2023-09-25

## 2023-09-25 RX ORDER — ATORVASTATIN CALCIUM 40 MG/1
TABLET, FILM COATED ORAL
Qty: 45 TABLET | Refills: 3 | Status: CANCELLED | OUTPATIENT
Start: 2023-09-25

## 2023-09-25 RX ORDER — ROSUVASTATIN CALCIUM 40 MG/1
40 TABLET, COATED ORAL DAILY
Qty: 90 TABLET | Refills: 3 | Status: SHIPPED | OUTPATIENT
Start: 2023-09-25

## 2023-09-25 RX ORDER — PRASUGREL 10 MG/1
TABLET, FILM COATED ORAL
Qty: 90 TABLET | Refills: 3 | Status: SHIPPED | OUTPATIENT
Start: 2023-09-25

## 2023-09-25 RX ORDER — FLUTICASONE PROPIONATE 50 MCG
1 SPRAY, SUSPENSION (ML) NASAL DAILY
Qty: 3 EACH | Refills: 5 | Status: SHIPPED | OUTPATIENT
Start: 2023-09-25

## 2023-09-25 SDOH — ECONOMIC STABILITY: FOOD INSECURITY: WITHIN THE PAST 12 MONTHS, YOU WORRIED THAT YOUR FOOD WOULD RUN OUT BEFORE YOU GOT MONEY TO BUY MORE.: NEVER TRUE

## 2023-09-25 SDOH — ECONOMIC STABILITY: FOOD INSECURITY: WITHIN THE PAST 12 MONTHS, THE FOOD YOU BOUGHT JUST DIDN'T LAST AND YOU DIDN'T HAVE MONEY TO GET MORE.: NEVER TRUE

## 2023-09-25 SDOH — ECONOMIC STABILITY: INCOME INSECURITY: HOW HARD IS IT FOR YOU TO PAY FOR THE VERY BASICS LIKE FOOD, HOUSING, MEDICAL CARE, AND HEATING?: NOT HARD AT ALL

## 2023-09-25 SDOH — ECONOMIC STABILITY: HOUSING INSECURITY
IN THE LAST 12 MONTHS, WAS THERE A TIME WHEN YOU DID NOT HAVE A STEADY PLACE TO SLEEP OR SLEPT IN A SHELTER (INCLUDING NOW)?: NO

## 2023-09-25 ASSESSMENT — PATIENT HEALTH QUESTIONNAIRE - PHQ9
2. FEELING DOWN, DEPRESSED OR HOPELESS: 0
SUM OF ALL RESPONSES TO PHQ QUESTIONS 1-9: 0
SUM OF ALL RESPONSES TO PHQ9 QUESTIONS 1 & 2: 0
SUM OF ALL RESPONSES TO PHQ QUESTIONS 1-9: 0
1. LITTLE INTEREST OR PLEASURE IN DOING THINGS: 0

## 2023-09-25 ASSESSMENT — LIFESTYLE VARIABLES
HOW OFTEN DO YOU HAVE A DRINK CONTAINING ALCOHOL: 2-3 TIMES A WEEK
HOW MANY STANDARD DRINKS CONTAINING ALCOHOL DO YOU HAVE ON A TYPICAL DAY: 1 OR 2

## 2023-09-25 NOTE — TELEPHONE ENCOUNTER
Scheduled PFT at [facility] for Wednesday Sept. 27th at 7:30am.     Pt can not make this appt because he needs to put eneida on the bus. Pt is going to call [facility] central scheduling to make appt for his convenience. I gave him the number [phone]    Also informed pt of the prep  He needs to go to the Heart and Vascular unit on the 2nd floor. Bring ID and Insurance. No caffeine, no smoking, no alcohol, no exercise, no form fitting clothes.      Pt is allowed to have a light meal.

## 2023-09-25 NOTE — PROGRESS NOTES
Medicare Annual Wellness Visit    Surinder Whittaker is here for Medicare AWV    Assessment & Plan   Pure hypercholesterolemia  -     rosuvastatin (CRESTOR) 40 MG tablet; Take 1 tablet by mouth daily, Disp-90 tablet, R-3Normal  -     LDL Cholesterol, Direct; Future  -     Hepatic Function Panel; Future  Coronary artery disease with angina pectoris, unspecified vessel or lesion type, unspecified whether native or transplanted heart (HCC)  -     prasugrel (EFFIENT) 10 MG TABS; TAKE ONE TABLET BY MOUTH ONE TIME A DAY, Disp-90 tablet, R-3Normal  -     lisinopril (PRINIVIL;ZESTRIL) 10 MG tablet; Take 1 tablet by mouth 2 times daily, Disp-180 tablet, R-3Normal  Essential hypertension  -     lisinopril (PRINIVIL;ZESTRIL) 10 MG tablet; Take 1 tablet by mouth 2 times daily, Disp-180 tablet, R-3Normal  Chronic rhinitis  -     fluticasone (FLONASE) 50 MCG/ACT nasal spray; 1 spray by Each Nostril route daily, Disp-3 each, R-5Normal  Pulmonary emphysema, unspecified emphysema type ([address])  -     Full PFT Study With Bronchodilator; Future  -     XR CHEST STANDARD (2 VW); Future  Need for hepatitis C screening test  -     Hepatitis C Antibody; Future    Recommendations for Preventive Services Due: see orders and patient instructions/AVS.  Recommended screening schedule for the next 5-10 years is provided to the patient in written form: see Patient Instructions/AVS.     No follow-ups on file. Subjective   The following acute and/or chronic problems were also addressed today:  He's noticed more trouble with SOB especially with bending forward. There is some taste in the mouth in the morning and the sputum is not purulent. It seems to be coming from the chest and not the head. We reviewed the meds and how the metoprolol could be increased for the BP    Patient's complete Health Risk Assessment and screening values have been reviewed and are found in Flowsheets.  The following problems were reviewed today and where indicated follow up

## 2023-09-25 NOTE — PATIENT INSTRUCTIONS
Advance Directives: Care Instructions  Overview  An advance directive is a legal way to state your wishes at the end of your life. It tells your family and your doctor what to do if you can't say what you want. There are two main types of advance directives. You can change them any time your wishes change. Living will. This form tells your family and your doctor your wishes about life support and other treatment. The form is also called a declaration. Medical power of . This form lets you name a person to make treatment decisions for you when you can't speak for yourself. This person is called a health care agent (health care proxy, health care surrogate). The form is also called a durable power of  for health care. If you do not have an advance directive, decisions about your medical care may be made by a family member, or by a doctor or a  who doesn't know you. It may help to think of an advance directive as a gift to the people who care for you. If you have one, they won't have to make tough decisions by themselves. For more information, including forms for your state, see the [address] [place] [address] website (www.caringinfo.org/planning/advance-directives/). Follow-up care is a key part of your treatment and safety. Be sure to make and go to all appointments, and call your doctor if you are having problems. It's also a good idea to know your test results and keep a list of the medicines you take. What should you include in an advance directive? Many states have a unique advance directive form. (It may ask you to address specific issues.) Or you might use a universal form that's approved by many states. If your form doesn't tell you what to address, it may be hard to know what to include in your advance directive. Use the questions below to help you get started. Who do you want to make decisions about your medical care if you are not able to?   What life-support measures do you want if you

## 2023-10-05 ENCOUNTER — TELEPHONE (OUTPATIENT)
Dept: FAMILY MEDICINE CLINIC | Age: 68
End: 2023-10-05

## 2023-10-05 ENCOUNTER — HOSPITAL ENCOUNTER (OUTPATIENT)
Dept: GENERAL RADIOLOGY | Age: 68
Discharge: HOME | End: 2023-10-05
Attending: FAMILY MEDICINE
Payer: COMMERCIAL

## 2023-10-05 ENCOUNTER — HOSPITAL ENCOUNTER (OUTPATIENT)
Age: 68
Discharge: HOME | End: 2023-10-05
Attending: FAMILY MEDICINE
Payer: COMMERCIAL

## 2023-10-05 ENCOUNTER — HOSPITAL ENCOUNTER (OUTPATIENT)
Dept: PULMONOLOGY | Age: 68
Discharge: HOME | End: 2023-10-05
Attending: FAMILY MEDICINE
Payer: COMMERCIAL

## 2023-10-05 DIAGNOSIS — Z11.59 NEED FOR HEPATITIS C SCREENING TEST: ICD-10-CM

## 2023-10-05 DIAGNOSIS — J43.9 PULMONARY EMPHYSEMA, UNSPECIFIED EMPHYSEMA TYPE (HCC): ICD-10-CM

## 2023-10-05 LAB — HCV IGG SERPL QL IA: NEGATIVE

## 2023-10-05 PROCEDURE — 94060 EVALUATION OF WHEEZING: CPT

## 2023-10-05 PROCEDURE — 94726 PLETHYSMOGRAPHY LUNG VOLUMES: CPT

## 2023-10-05 PROCEDURE — 86803 HEPATITIS C AB TEST: CPT

## 2023-10-05 PROCEDURE — 71046 X-RAY EXAM CHEST 2 VIEWS: CPT

## 2023-10-05 PROCEDURE — 36415 COLL VENOUS BLD VENIPUNCTURE: CPT

## 2023-10-05 PROCEDURE — 94729 DIFFUSING CAPACITY: CPT

## 2023-10-05 NOTE — TELEPHONE ENCOUNTER
----- Message from Cristiano Calderón MD sent at 10/5/2023  2:54 PM EDT -----  The hepatitis C lab was fine

## 2023-10-05 NOTE — TELEPHONE ENCOUNTER
----- Message from Verna Chatterjee MD sent at 10/5/2023  2:53 PM EDT -----  Let him know the CXR was fine.

## 2023-10-05 NOTE — TELEPHONE ENCOUNTER
I have personally seen and examined the patient.  I reviewed and agree with physician assistant / nurse practitioner’s assessment and plan of care, with the following exceptions: None  My examination, assessment, and plan of care of Wilder Wilder is as follows:     Exam: Well-appearing, no acute distress, wake alert oriented x3, tenderness to right medial scapular area tracking to right lateral neck and right posterior shoulder in the distribution of the trapezius with associated muscle spasm, mildly limited range of motion with rotation to the head to the right, good range of motion with rotation to the left    Assessment and plan: Patient with trapezius muscle spasm, symptomatic treatment and outpatient follow-up     Jamey Gonzales,   02/15/22 0909     Sole Fields informed by Phone.

## 2023-10-10 ENCOUNTER — OFFICE VISIT (OUTPATIENT)
Dept: PULMONOLOGY | Age: 68
End: 2023-10-10
Payer: COMMERCIAL

## 2023-10-10 VITALS
WEIGHT: 209.2 LBS | OXYGEN SATURATION: 94 % | DIASTOLIC BLOOD PRESSURE: 82 MMHG | BODY MASS INDEX: 26.85 KG/M2 | HEIGHT: 74 IN | HEART RATE: 74 BPM | SYSTOLIC BLOOD PRESSURE: 138 MMHG

## 2023-10-10 DIAGNOSIS — R06.02 SOB (SHORTNESS OF BREATH): ICD-10-CM

## 2023-10-10 DIAGNOSIS — G47.33 OSA ON CPAP: Primary | ICD-10-CM

## 2023-10-10 PROCEDURE — 3079F DIAST BP 80-89 MM HG: CPT | Performed by: PHYSICIAN ASSISTANT

## 2023-10-10 PROCEDURE — 1123F ACP DISCUSS/DSCN MKR DOCD: CPT | Performed by: PHYSICIAN ASSISTANT

## 2023-10-10 PROCEDURE — 3075F SYST BP GE 130 - 139MM HG: CPT | Performed by: PHYSICIAN ASSISTANT

## 2023-10-10 PROCEDURE — 99214 OFFICE O/P EST MOD 30 MIN: CPT | Performed by: PHYSICIAN ASSISTANT

## 2023-10-10 ASSESSMENT — ENCOUNTER SYMPTOMS
ALLERGIC/IMMUNOLOGIC NEGATIVE: 1
COUGH: 0
BACK PAIN: 0
EYES NEGATIVE: 1
WHEEZING: 0
NAUSEA: 0
SHORTNESS OF BREATH: 1
CHEST TIGHTNESS: 0
DIARRHEA: 0
STRIDOR: 0

## 2023-10-10 NOTE — PROGRESS NOTES
[facility]      Eddy Banuelos         [id number]  10/10/2023   Chief Complaint   Patient presents with    Follow-up     1 year REANNA follow up with download. Pt of Dr. Melani Lauren    PAP Download:   Original or initial AHI: 4.8     Date of initial study: 4/29/11      Compliant  100%     Noncompliant 0%     PAP Type CPAP Level  [id number]   Avg Hrs/Day 6 hours 43 minutes  AHI: 1.6   Recorded compliance dates 9/9/23-10/8/23  Machine/Mfg:   [x] ResMed    [] Respironics/Dreamstation   Interface:   [] Nasal    [] Nasal pillows   [x] FFM      Provider:      [x] [facility]     [][facility]     [] [employer]    [] [facility]    [] [facility]               [] [facility]      [] Adaptive    [] [address] [facility] [address]:      [] Other    Neck Size: 15.25 inches  Mallampati 3  ESS:  6  SAQLI: 83    Here is a scan of the most recent download:            Presentation:   Savage Rich presents for sleep medicine follow up for obstructive sleep apnea  Since the last visit, Savage Rich is doing well with PAP. He is sleeping well and feels rested. Mask fits well. He had PFT's for SOB. He has been having increased JAMISON but also some chest discomfort. He is having some bloating with PAP. Equipment issues: The pressure is  acceptable, the mask is acceptable     Sleep issues:  Do you feel better? Yes  More rested? Yes   Better concentration? yes    Progress History:   Since last visit any new medical issues? Yes prostate surgery  New ER or hospital visits? yes  Any new or changes in medicines? No  Any new sleep medicines? No    Review of Systems -   Review of Systems   Constitutional:  Negative for activity change, appetite change, chills and fever. HENT:  Negative for congestion and postnasal drip. Eyes: Negative. Respiratory:  Positive for shortness of breath. Negative for cough, chest tightness, wheezing and stridor. Cardiovascular:  Positive for chest pain. Negative for leg swelling.    Gastrointestinal:  Negative

## 2023-10-24 ENCOUNTER — OFFICE VISIT (OUTPATIENT)
Dept: FAMILY MEDICINE CLINIC | Age: 68
End: 2023-10-24

## 2023-10-24 VITALS
BODY MASS INDEX: 26.98 KG/M2 | SYSTOLIC BLOOD PRESSURE: 130 MMHG | RESPIRATION RATE: 16 BRPM | DIASTOLIC BLOOD PRESSURE: 70 MMHG | HEART RATE: 74 BPM | HEIGHT: 74 IN | WEIGHT: 210.25 LBS

## 2023-10-24 DIAGNOSIS — E78.00 PURE HYPERCHOLESTEROLEMIA: ICD-10-CM

## 2023-10-24 DIAGNOSIS — I25.119 CORONARY ARTERY DISEASE WITH ANGINA PECTORIS, UNSPECIFIED VESSEL OR LESION TYPE, UNSPECIFIED WHETHER NATIVE OR TRANSPLANTED HEART (HCC): ICD-10-CM

## 2023-10-24 DIAGNOSIS — J44.9 CHRONIC OBSTRUCTIVE PULMONARY DISEASE, UNSPECIFIED COPD TYPE (HCC): Primary | ICD-10-CM

## 2023-10-24 DIAGNOSIS — I10 ESSENTIAL HYPERTENSION: ICD-10-CM

## 2023-10-24 PROCEDURE — 1123F ACP DISCUSS/DSCN MKR DOCD: CPT | Performed by: FAMILY MEDICINE

## 2023-10-24 PROCEDURE — 99214 OFFICE O/P EST MOD 30 MIN: CPT | Performed by: FAMILY MEDICINE

## 2023-10-24 RX ORDER — METOPROLOL TARTRATE 50 MG/1
50 TABLET, FILM COATED ORAL 2 TIMES DAILY
Qty: 180 TABLET | Refills: 3 | Status: SHIPPED | OUTPATIENT
Start: 2023-10-24

## 2023-10-24 ASSESSMENT — ENCOUNTER SYMPTOMS
SINUS PRESSURE: 0
SHORTNESS OF BREATH: 1
CONSTIPATION: 0

## 2023-10-24 NOTE — PATIENT INSTRUCTIONS
Stop the rosuvastatin and let me know in 3 weeks how the muscles are feeling. Check the oxygen level when you feel winded.   Let me know if you feel you would want to try any inhaled meds for the mild COPD  Let me know if the swelling of the right elbow increases  See me in September

## 2023-10-31 ENCOUNTER — OFFICE VISIT (OUTPATIENT)
Dept: CARDIOLOGY CLINIC | Age: 68
End: 2023-10-31

## 2023-10-31 VITALS
HEIGHT: 74 IN | BODY MASS INDEX: 27.23 KG/M2 | OXYGEN SATURATION: 95 % | SYSTOLIC BLOOD PRESSURE: 137 MMHG | DIASTOLIC BLOOD PRESSURE: 81 MMHG | WEIGHT: 212.2 LBS | HEART RATE: 53 BPM

## 2023-10-31 DIAGNOSIS — I10 PRIMARY HYPERTENSION: ICD-10-CM

## 2023-10-31 DIAGNOSIS — M79.10 MYALGIA DUE TO STATIN: ICD-10-CM

## 2023-10-31 DIAGNOSIS — R06.02 SHORTNESS OF BREATH: Primary | ICD-10-CM

## 2023-10-31 DIAGNOSIS — E78.01 FAMILIAL HYPERCHOLESTEROLEMIA: ICD-10-CM

## 2023-10-31 DIAGNOSIS — T46.6X5A MYALGIA DUE TO STATIN: ICD-10-CM

## 2023-10-31 DIAGNOSIS — I25.10 CORONARY ARTERY DISEASE INVOLVING NATIVE CORONARY ARTERY OF NATIVE HEART WITHOUT ANGINA PECTORIS: ICD-10-CM

## 2023-10-31 DIAGNOSIS — I71.40 ABDOMINAL AORTIC ANEURYSM (AAA) WITHOUT RUPTURE, UNSPECIFIED PART (HCC): ICD-10-CM

## 2023-10-31 NOTE — PATIENT INSTRUCTIONS
Stop the Crestor. Continue other current medications as prescribed. Have the repeat lipid panel done as per Dr. Gisele Vyas. If LDL still above 70 then would recommend starting an alternative agent such as Repatha. Or check other alternatives. Begin to walk regularly as discussed and see how breathing is. Monitor breathing now that we have had hard freeze. Consider starting the medication for COPD as discussed with Dr. Gisele Vyas if the breathing does not improve. Stay as active as you can. Eat heart healthy diet. Follow-up with your PCP as scheduled. Follow-up with Dr. Barbi Burton 3/13/2023  as scheduled or sooner if need.

## 2023-10-31 NOTE — PROGRESS NOTES
The patient presents for a follow-up due to shortness of breath. He states he has some dizziness when bending over. The patient denies chest pain, heart palpitations, and swelling of the lower extremities.
questions answered. Pt voicedunderstanding. Instructed to continue current medications, diet and exercise. Continue risk factor modification and medical management. Patient agreed with treatment plan. Follow up as directed.     Electronically signedby VAL Benavides CNP on 11/11/2023 at 10:39 AM

## 2023-11-30 ENCOUNTER — TELEPHONE (OUTPATIENT)
Dept: FAMILY MEDICINE CLINIC | Age: 68
End: 2023-11-30

## 2023-11-30 DIAGNOSIS — J44.9 CHRONIC OBSTRUCTIVE PULMONARY DISEASE, UNSPECIFIED COPD TYPE (HCC): Primary | ICD-10-CM

## 2023-11-30 DIAGNOSIS — I25.119 CORONARY ARTERY DISEASE WITH ANGINA PECTORIS, UNSPECIFIED VESSEL OR LESION TYPE, UNSPECIFIED WHETHER NATIVE OR TRANSPLANTED HEART (HCC): Primary | ICD-10-CM

## 2023-11-30 DIAGNOSIS — E78.00 PURE HYPERCHOLESTEROLEMIA: ICD-10-CM

## 2023-11-30 RX ORDER — EVOLOCUMAB 140 MG/ML
140 INJECTION, SOLUTION SUBCUTANEOUS
Qty: 12 ADJUSTABLE DOSE PRE-FILLED PEN SYRINGE | Refills: 3 | Status: SHIPPED | OUTPATIENT
Start: 2023-11-30

## 2023-11-30 RX ORDER — BUDESONIDE, GLYCOPYRROLATE, AND FORMOTEROL FUMARATE 160; 9; 4.8 UG/1; UG/1; UG/1
2 AEROSOL, METERED RESPIRATORY (INHALATION) 2 TIMES DAILY
Qty: 1 EACH | Refills: 5 | Status: SHIPPED | OUTPATIENT
Start: 2023-11-30 | End: 2024-01-19 | Stop reason: SDUPTHER

## 2023-11-30 NOTE — TELEPHONE ENCOUNTER
Patient called into office and wanted to update you that is muscle pain has improved days after stopping statin. He wanted to start taking the injection every two weeks that you guys had discussed. He also heard that there was a injection once a year for cholesterol also and he wondered if you were familiar with that one? Also wondering if blood work is needed before new injectable cholesterol medication? He needs a refill of the inhaler you discussed at his appt also. [facility]

## 2023-11-30 NOTE — TELEPHONE ENCOUNTER
The once a year shot for cholesterol is not available yet. Continue off the statin  We can begin the repatha process. ( Do I need to order it now?)  I will send a Rx to the pharm for him to use twice daily.

## 2023-12-01 NOTE — TELEPHONE ENCOUNTER
Repatha paperwork on your desk for filling out.   Needs filled out and then need to send to specialty pharmacy

## 2023-12-21 DIAGNOSIS — I25.119 CORONARY ARTERY DISEASE WITH ANGINA PECTORIS, UNSPECIFIED VESSEL OR LESION TYPE, UNSPECIFIED WHETHER NATIVE OR TRANSPLANTED HEART (HCC): ICD-10-CM

## 2023-12-21 DIAGNOSIS — E78.00 PURE HYPERCHOLESTEROLEMIA: ICD-10-CM

## 2023-12-21 RX ORDER — EVOLOCUMAB 140 MG/ML
140 INJECTION, SOLUTION SUBCUTANEOUS
Qty: 12 ADJUSTABLE DOSE PRE-FILLED PEN SYRINGE | Refills: 3 | Status: SHIPPED | OUTPATIENT
Start: 2023-12-21

## 2023-12-27 ENCOUNTER — TELEPHONE (OUTPATIENT)
Dept: INTERNAL MEDICINE | Age: 68
End: 2023-12-27

## 2023-12-27 ENCOUNTER — ENROLLMENT (OUTPATIENT)
Dept: INTERNAL MEDICINE | Age: 68
End: 2023-12-27

## 2023-12-27 NOTE — TELEPHONE ENCOUNTER
Specialty Medication Service    Date: 12/27/2023  Patient's Name: Diamond Calix YOB: 1955            _____________________________________________________________________________________________    Email received from [address] in regard to current SMS formulary medication, Sally Barron. Initial SMS override placed. SMS team to outreach to schedule initial assessment.      Russ Garcia CPhT  Pharmacy   Specialty Medication Services   Phone: [phone] option 4    For Pharmacy Admin Tracking Only    Program: SMS  CPA in place:  No  Recommendation Provided To: Pharmacy: 1  Intervention Detail: Benefit Assistance  Intervention Accepted By: Pharmacy: 1  Gap Closed?:    Time Spent (min): 15

## 2023-12-27 NOTE — TELEPHONE ENCOUNTER
Specialty Medication Service    Date: 12/27/2023  Patient's Name: Kaleb Mederos YOB: 1955            _____________________________________________________________________________________________    Unable to leave message and Sent text message to schedule PharmD initial appointment for Specialty Medication Services. Please call: [phone] option 4. Will continue to outreach as appropriate. Most recent office notes from Goyo Desouza in patient chart.    Otilia Paul CPhT  Pharmacy   Specialty Medication Services   Phone: [phone] option 4

## 2023-12-29 NOTE — TELEPHONE ENCOUNTER
Specialty Medication Service    Date: 12/29/2023  Patient's Name: Kaleb Mederos YOB: 1955            _____________________________________________________________________________________________    Unable to leave message and Sent text message to schedule PharmD initial appointment for Specialty Medication Services. Please call: [phone] option 4. Will continue to outreach as appropriate. Most recent office notes from Goyo Desouza in patient chart.     Otilia Paul CPhT  Pharmacy   Specialty Medication Services   Phone: [phone] option 4

## 2024-01-02 NOTE — TELEPHONE ENCOUNTER
Specialty Medication Service    Date: 1/2/2024  Patient's Name: Kaleb Mederos YOB: 1955            _____________________________________________________________________________________________    Unable to leave message and Sent text message to schedule PharmD initial appointment for Specialty Medication Services. Please call: [phone] option 4. Will continue to outreach as appropriate. Most recent office notes from Goyo Desouza in patient chart.     Otilia Paul CPhT  Pharmacy   Specialty Medication Services   Phone: [phone] option 4    For Pharmacy Admin Tracking Only    Program: SMS  CPA in place:  No  Recommendation Provided To: Patient/Caregiver: 1 via Telephone  Intervention Detail: Scheduled Appointment  Intervention Accepted By: Patient/Caregiver: 0  Gap Closed?:    Time Spent (min): 15

## 2024-01-19 ENCOUNTER — TELEPHONE (OUTPATIENT)
Dept: FAMILY MEDICINE CLINIC | Age: 69
End: 2024-01-19

## 2024-01-19 ENCOUNTER — OFFICE VISIT (OUTPATIENT)
Dept: FAMILY MEDICINE CLINIC | Age: 69
End: 2024-01-19

## 2024-01-19 VITALS
WEIGHT: 213.38 LBS | HEIGHT: 74 IN | DIASTOLIC BLOOD PRESSURE: 80 MMHG | RESPIRATION RATE: 18 BRPM | BODY MASS INDEX: 27.39 KG/M2 | HEART RATE: 70 BPM | SYSTOLIC BLOOD PRESSURE: 136 MMHG

## 2024-01-19 DIAGNOSIS — I71.40 ABDOMINAL AORTIC ANEURYSM (AAA) WITHOUT RUPTURE, UNSPECIFIED PART (HCC): Primary | ICD-10-CM

## 2024-01-19 DIAGNOSIS — J44.9 CHRONIC OBSTRUCTIVE PULMONARY DISEASE, UNSPECIFIED COPD TYPE (HCC): ICD-10-CM

## 2024-01-19 DIAGNOSIS — R22.31 MASS OF RIGHT ELBOW: ICD-10-CM

## 2024-01-19 DIAGNOSIS — I25.119 CORONARY ARTERY DISEASE WITH ANGINA PECTORIS, UNSPECIFIED VESSEL OR LESION TYPE, UNSPECIFIED WHETHER NATIVE OR TRANSPLANTED HEART (HCC): ICD-10-CM

## 2024-01-19 RX ORDER — BUDESONIDE, GLYCOPYRROLATE, AND FORMOTEROL FUMARATE 160; 9; 4.8 UG/1; UG/1; UG/1
2 AEROSOL, METERED RESPIRATORY (INHALATION) 2 TIMES DAILY
Qty: 1 EACH | Refills: 5 | Status: SHIPPED | OUTPATIENT
Start: 2024-01-19

## 2024-01-19 ASSESSMENT — PATIENT HEALTH QUESTIONNAIRE - PHQ9
SUM OF ALL RESPONSES TO PHQ QUESTIONS 1-9: 0
1. LITTLE INTEREST OR PLEASURE IN DOING THINGS: 0
SUM OF ALL RESPONSES TO PHQ QUESTIONS 1-9: 0
2. FEELING DOWN, DEPRESSED OR HOPELESS: 0
SUM OF ALL RESPONSES TO PHQ QUESTIONS 1-9: 0
SUM OF ALL RESPONSES TO PHQ9 QUESTIONS 1 & 2: 0
SUM OF ALL RESPONSES TO PHQ QUESTIONS 1-9: 0

## 2024-01-19 ASSESSMENT — ENCOUNTER SYMPTOMS
SINUS PRESSURE: 0
SHORTNESS OF BREATH: 0
CONSTIPATION: 0

## 2024-01-19 NOTE — TELEPHONE ENCOUNTER
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    Please call Shu once ready to P/U

## 2024-01-19 NOTE — PROGRESS NOTES
Kaleb Mederos (:  1955) is a 69 y.o. male,Established patient, here for evaluation of the following chief complaint(s):  Other (Lump on arm.)         ASSESSMENT/PLAN:   Diagnosis Orders   1. Abdominal aortic aneurysm (AAA) without rupture, unspecified part ([facility])        2. Chronic obstructive pulmonary disease, unspecified COPD type ([facility])  Budeson-Glycopyrrol-Formoterol (BREZTRI AEROSPHERE) 160-9-4.8 MCG/ACT AERO      3. Coronary artery disease with angina pectoris, unspecified vessel or lesion type, unspecified whether native or transplanted heart ([facility])        4. Mass of right elbow          Let me know the name of the general surgeon you would like to see for the elbow             Subjective   SUBJECTIVE/OBJECTIVE:  HPI  He states that he started walking on the treadmill before beginning the breztri and the walking helped  He uses the inhaler at times. There is no mouth irritation.  The lump in the lateral right elbow seems larger and the hand feels cramped at times and is somewhat weaker.   We discussed how there was a photo of it taken in late   He sees the cardiologist for the CAD and the AAA  Review of Systems   Constitutional:  Negative for fatigue.   HENT:  Negative for sinus pressure.    Eyes:  Negative for visual disturbance.   Respiratory:  Negative for shortness of breath.    Cardiovascular:  Negative for chest pain.   Gastrointestinal:  Negative for constipation.   Genitourinary: Negative.    Musculoskeletal:  Negative for arthralgias.   Skin:  Negative for rash.   Neurological:  Negative for headaches.    The patient's medications, allergies, past medical problems, surgical, social, and family histories were reviewed and updated as needed.      Objective   Physical Exam  Constitutional:       Appearance: Normal appearance. He is well-developed.   HENT:      Head: Normocephalic and atraumatic.   Eyes:      General: No scleral icterus.     Conjunctiva/sclera: Conjunctivae normal.

## 2024-02-01 ENCOUNTER — TELEPHONE (OUTPATIENT)
Dept: FAMILY MEDICINE CLINIC | Age: 69
End: 2024-02-01

## 2024-02-01 RX ORDER — AZITHROMYCIN 250 MG/1
TABLET, FILM COATED ORAL
Qty: 1 PACKET | Refills: 0 | Status: SHIPPED | OUTPATIENT
Start: 2024-02-01

## 2024-02-01 NOTE — TELEPHONE ENCOUNTER
Pt called to [facility] he is having cough sinus congestion sore throat sinus pressure mucus is yellow    Please send to [facility] [address]     Please call pt once addressed [phone]

## 2024-02-01 NOTE — TELEPHONE ENCOUNTER
Date of last visit:  1/19/2024  Date of next visit:  Visit date not found    Requested Prescriptions     Signed Prescriptions Disp Refills    azithromycin (ZITHROMAX) 250 MG tablet 1 packet 0     Sig: Take 2 tabs (500 mg) on Day 1, and take 1 tab (250 mg) on days 2 through 5.     Authorizing Provider: PETRA DESOUZA     Ordering User: KRISTEN MAHAN       Sent over script to Pharmacy per verbal order from Dr Desouza. Kaleb informed by Phone.

## 2024-02-02 ENCOUNTER — TELEPHONE (OUTPATIENT)
Dept: INTERNAL MEDICINE | Age: 69
End: 2024-02-02

## 2024-02-02 NOTE — TELEPHONE ENCOUNTER
Specialty Medication Service    Date: 2/2/2024  Patient's Name: Kaleb Mederos YOB: 1955            _____________________________________________________________________________________________    Patient returned call to schedule PharmD initial appointment for Specialty Medication Services. Patient scheduled 02/05/2024 at 11 am.  Most recent office notes from HENRIQUE Desouza in patient chart.    Otliia Paul CPhT  Pharmacy   Specialty Medication Services   Phone: [phone] option 4    For Pharmacy Admin Tracking Only    Program: SMS  CPA in place:  No  Recommendation Provided To: Patient/Caregiver: 1 via Telephone  Intervention Detail: Scheduled Appointment  Intervention Accepted By: Patient/Caregiver: 1  Gap Closed?:    Time Spent (min): 15

## 2024-02-05 ENCOUNTER — TELEPHONE (OUTPATIENT)
Dept: FAMILY MEDICINE CLINIC | Age: 69
End: 2024-02-05

## 2024-02-05 ENCOUNTER — PHARMACY VISIT (OUTPATIENT)
Dept: INTERNAL MEDICINE | Age: 69
End: 2024-02-05

## 2024-02-05 ENCOUNTER — OFFICE VISIT (OUTPATIENT)
Dept: UROLOGY | Age: 69
End: 2024-02-05
Payer: COMMERCIAL

## 2024-02-05 VITALS — RESPIRATION RATE: 16 BRPM | WEIGHT: 213 LBS | HEIGHT: 74 IN | BODY MASS INDEX: 27.34 KG/M2

## 2024-02-05 DIAGNOSIS — E78.01 FAMILIAL HYPERCHOLESTEROLEMIA: Primary | ICD-10-CM

## 2024-02-05 DIAGNOSIS — N13.8 BPH WITH OBSTRUCTION/LOWER URINARY TRACT SYMPTOMS: Primary | ICD-10-CM

## 2024-02-05 DIAGNOSIS — N40.1 BPH WITH OBSTRUCTION/LOWER URINARY TRACT SYMPTOMS: Primary | ICD-10-CM

## 2024-02-05 PROBLEM — G56.02 LEFT CARPAL TUNNEL SYNDROME: Status: ACTIVE | Noted: 2024-02-05

## 2024-02-05 LAB
BILIRUBIN URINE: NEGATIVE
BLOOD URINE, POC: NEGATIVE
CHARACTER, URINE: CLEAR
COLOR, URINE: YELLOW
GLUCOSE URINE: NEGATIVE MG/DL
KETONES, URINE: NEGATIVE
LEUKOCYTE CLUMPS, URINE: ABNORMAL
NITRITE, URINE: NEGATIVE
PH, URINE: 6.5 (ref 5–9)
POST VOID RESIDUAL (PVR): 65 ML
PROTEIN, URINE: NEGATIVE MG/DL
SPECIFIC GRAVITY, URINE: 1.02 (ref 1–1.03)
UROBILINOGEN, URINE: 0.2 EU/DL (ref 0–1)

## 2024-02-05 PROCEDURE — 51798 US URINE CAPACITY MEASURE: CPT

## 2024-02-05 PROCEDURE — 81003 URINALYSIS AUTO W/O SCOPE: CPT

## 2024-02-05 PROCEDURE — 99999 PR OFFICE/OUTPT VISIT,PROCEDURE ONLY: CPT | Performed by: PHARMACIST

## 2024-02-05 PROCEDURE — 99214 OFFICE O/P EST MOD 30 MIN: CPT

## 2024-02-05 PROCEDURE — 1123F ACP DISCUSS/DSCN MKR DOCD: CPT

## 2024-02-05 ASSESSMENT — PROMIS GLOBAL HEALTH SCALE
IN GENERAL, HOW WOULD YOU RATE YOUR MENTAL HEALTH, INCLUDING YOUR MOOD AND YOUR ABILITY TO THINK [ON A SCALE OF 1 (POOR) TO 5 (EXCELLENT)]?: 4
IN GENERAL, HOW WOULD YOU RATE YOUR PHYSICAL HEALTH [ON A SCALE OF 1 (POOR) TO 5 (EXCELLENT)]?: 3
IN THE PAST 7 DAYS, HOW OFTEN HAVE YOU BEEN BOTHERED BY EMOTIONAL PROBLEMS, SUCH AS FEELING ANXIOUS, DEPRESSED, OR IRRITABLE [ON A SCALE FROM 1 (NEVER) TO 5 (ALWAYS)]?: 5
SUM OF RESPONSES TO QUESTIONS 2, 4, 5, & 10: 17
IN THE PAST 7 DAYS, HOW WOULD YOU RATE YOUR PAIN ON AVERAGE [ON A SCALE FROM 0 (NO PAIN) TO 10 (WORST IMAGINABLE PAIN)]?: 2
IN GENERAL, WOULD YOU SAY YOUR HEALTH IS...[ON A SCALE OF 1 (POOR) TO 5 (EXCELLENT)]: 3
SUM OF RESPONSES TO QUESTIONS 3, 6, 7, & 8: 14
TO WHAT EXTENT ARE YOU ABLE TO CARRY OUT YOUR EVERYDAY PHYSICAL ACTIVITIES SUCH AS WALKING, CLIMBING STAIRS, CARRYING GROCERIES, OR MOVING A CHAIR [ON A SCALE OF 1 (NOT AT ALL) TO 5 (COMPLETELY)]?: 5
IN GENERAL, WOULD YOU SAY YOUR QUALITY OF LIFE IS...[ON A SCALE OF 1 (POOR) TO 5 (EXCELLENT)]: 4
IN GENERAL, PLEASE RATE HOW WELL YOU CARRY OUT YOUR USUAL SOCIAL ACTIVITIES (INCLUDES ACTIVITIES AT HOME, AT WORK, AND IN YOUR COMMUNITY, AND RESPONSIBILITIES AS A PARENT, CHILD, SPOUSE, EMPLOYEE, FRIEND, ETC) [ON A SCALE OF 1 (POOR) TO 5 (EXCELLENT)]?: 4
IN GENERAL, HOW WOULD YOU RATE YOUR SATISFACTION WITH YOUR SOCIAL ACTIVITIES AND RELATIONSHIPS [ON A SCALE OF 1 (POOR) TO 5 (EXCELLENT)]?: 4
IN THE PAST 7 DAYS, HOW WOULD YOU RATE YOUR FATIGUE ON AVERAGE [ON A SCALE FROM 1 (NONE) TO 5 (VERY SEVERE)]?: 4

## 2024-02-05 NOTE — PROGRESS NOTES
[facility] [place] [facility]  [facility] UROLOGY  [address]  [address]  [facility] [address]  Dept: [phone]  Loc: [phone]    Visit Date: 2/5/2024        HPI:     HPI  Mr. Mederos is a 69-year-old male that presents in follow-up.     Patient follows for elevated PSA.  Has persistently been elevated and oscillating.  Underwent an MRI of the prostate in February 2023.  Overall PI-RADS 2 findings.  He did also have a negative prostate biopsy in 2016 and 2023.    History of BPH.  Complains of nocturia x 3, as well as some abdominal discomfort. Flomax has offered some modest improvement in his symptomatology. He has underwent a greenlight PVP in 4/2023.  However his postoperative course was difficult and he did develop hematuria.    Finally history of erectile dysfunction.  Patient with a prescription for sildenafil from cardiologist.  However he does also have prescription for nitroglycerin.    Hx of ED evaluation in 6/2023 for painless hematuria. Patient does currently take effient and aspirin for coronary disease. Bedside ultrasound was notable for a large amount of urine in the bladder post void, as well as blood clots. Neely catheter was placed within the emergency department and notable for 200 mL output and a large blood clot. UA suggestive of infection, so Mr. Holland was started on Keflex. Follow-up renal ultrasound obtained in August 2023.  Remarkable for enlargement of the prostate gland causing mass effect on the urinary bladder.  There were otherwise no suspicious findings involving the kidneys or urinary bladder.    He presents today in follow-up to assess his PSA as well as obtain a PVR. Notes that he is doing well today.    Current Outpatient Medications   Medication Sig Dispense Refill    azithromycin (ZITHROMAX) 250 MG tablet Take 2 tabs (500 mg) on Day 1, and take 1 tab (250 mg) on days 2 through 5. 1 packet 0    Budeson-Glycopyrrol-Formoterol (BREZTRI AEROSPHERE)

## 2024-02-05 NOTE — PATIENT INSTRUCTIONS
Follow-up with PSA in 6 months  Call for blood in the urine  Call with questions, comments, or concerns. I recommend going to the ED for further evaluation if you develop fever, chills, nausea, vomiting, chest pain, SOB, or calf pain.

## 2024-02-05 NOTE — TELEPHONE ENCOUNTER
Sent pa for repatha through covermymeds.  Patient needs lab before we can send in progress notes and labs.  Patient needs to obtain these labs first.

## 2024-02-10 ENCOUNTER — NURSE ONLY (OUTPATIENT)
Dept: LAB | Age: 69
End: 2024-02-10

## 2024-02-10 DIAGNOSIS — N40.1 BPH WITH OBSTRUCTION/LOWER URINARY TRACT SYMPTOMS: ICD-10-CM

## 2024-02-10 DIAGNOSIS — E78.00 PURE HYPERCHOLESTEROLEMIA: ICD-10-CM

## 2024-02-10 DIAGNOSIS — N13.8 BPH WITH OBSTRUCTION/LOWER URINARY TRACT SYMPTOMS: ICD-10-CM

## 2024-02-10 LAB
ALBUMIN SERPL BCG-MCNC: 4.5 G/DL (ref 3.5–5.1)
ALP SERPL-CCNC: 97 U/L (ref 38–126)
ALT SERPL W/O P-5'-P-CCNC: 22 U/L (ref 11–66)
AST SERPL-CCNC: 26 U/L (ref 5–40)
BILIRUB CONJ SERPL-MCNC: < 0.2 MG/DL (ref 0–0.3)
BILIRUB SERPL-MCNC: 0.7 MG/DL (ref 0.3–1.2)
LDLC SERPL DIRECT ASSAY-MCNC: 150.45 MG/DL
PROT SERPL-MCNC: 6.9 G/DL (ref 6.1–8)
PSA SERPL-MCNC: 11.58 NG/ML (ref 0–1)

## 2024-02-12 ENCOUNTER — TELEPHONE (OUTPATIENT)
Dept: UROLOGY | Age: 69
End: 2024-02-12

## 2024-02-12 DIAGNOSIS — R97.20 ELEVATED PSA: Primary | ICD-10-CM

## 2024-02-12 RX ORDER — DOXYCYCLINE HYCLATE 100 MG
100 TABLET ORAL 2 TIMES DAILY
Qty: 20 TABLET | Refills: 0 | Status: SHIPPED | OUTPATIENT
Start: 2024-02-12 | End: 2024-02-22

## 2024-02-12 NOTE — TELEPHONE ENCOUNTER
Patient advised of the PSA results. He will start the doxycycline and repeat the PSA after completing the antibiotics.

## 2024-02-12 NOTE — TELEPHONE ENCOUNTER
Created  3 days ago  Sent to Plan  3 days ago  Plan Response  3 days ago  Submit Clinical Questions  3 days ago  Determination  Favorable  3 days ago  Message from Plan  The request has been approved. The authorization is effective from 02/09/2024 to 02/08/2025, as long as the member is enrolled in their current health plan. The request was approved as submitted. A written notification letter will follow with additional details.      PRESCRIBER INSTRUCTIONS

## 2024-02-12 NOTE — TELEPHONE ENCOUNTER
Voicemail left for patient to  the prescription and to call the office back.    Spoke to Abigail at the [facility] to send hepatic function and LDL

## 2024-02-12 NOTE — TELEPHONE ENCOUNTER
Would like patient to take 2 weeks of doxycycline and repeat PSA.  PSA is increased to 11      I will call with abnormal results    The patient should go to the ED should they develop fever, chills, nausea, vomiting, chest pain, SOB, calf pain, feelings of incomplete emptying, or should they otherwise feel they need evaluated      Also, can we call path labs? I am not the ordering provider for the hepatic function panel and LDLs.

## 2024-02-19 ENCOUNTER — TELEPHONE (OUTPATIENT)
Dept: FAMILY MEDICINE CLINIC | Age: 69
End: 2024-02-19

## 2024-02-19 NOTE — TELEPHONE ENCOUNTER
Pt called back. He stated that he was to start Repatha but it just got approved so he has not started it yet.    Pt also asked about the lump on his arm. He stated that it was mentioned about him to Dr. Matthews for the lump but he has not heard anything else. He asked if the referral to [facility] could be done for this.

## 2024-02-19 NOTE — TELEPHONE ENCOUNTER
----- Message from Goyo Desouza MD sent at 2/18/2024  7:29 PM EST -----  His LDL is too high. Is he doing anything medication wise to lower it?

## 2024-03-06 ENCOUNTER — NURSE ONLY (OUTPATIENT)
Dept: LAB | Age: 69
End: 2024-03-06

## 2024-03-06 DIAGNOSIS — R97.20 ELEVATED PSA: ICD-10-CM

## 2024-03-06 LAB — PSA SERPL-MCNC: 11.66 NG/ML (ref 0–1)

## 2024-03-07 ENCOUNTER — TELEPHONE (OUTPATIENT)
Dept: FAMILY MEDICINE CLINIC | Age: 69
End: 2024-03-07

## 2024-03-07 ENCOUNTER — OFFICE VISIT (OUTPATIENT)
Dept: FAMILY MEDICINE CLINIC | Age: 69
End: 2024-03-07

## 2024-03-07 ENCOUNTER — TELEPHONE (OUTPATIENT)
Dept: UROLOGY | Age: 69
End: 2024-03-07

## 2024-03-07 VITALS
SYSTOLIC BLOOD PRESSURE: 130 MMHG | WEIGHT: 212 LBS | HEIGHT: 74 IN | RESPIRATION RATE: 16 BRPM | DIASTOLIC BLOOD PRESSURE: 74 MMHG | HEART RATE: 68 BPM | BODY MASS INDEX: 27.21 KG/M2

## 2024-03-07 DIAGNOSIS — R22.31 MASS OF RIGHT ELBOW: Primary | ICD-10-CM

## 2024-03-07 DIAGNOSIS — E78.00 PURE HYPERCHOLESTEROLEMIA: ICD-10-CM

## 2024-03-07 ASSESSMENT — ENCOUNTER SYMPTOMS
SINUS PRESSURE: 0
CONSTIPATION: 0
SHORTNESS OF BREATH: 0

## 2024-03-07 NOTE — PATIENT INSTRUCTIONS
Use some over the counter hydrocortisone cream to the right thigh rash four times a day.  See Dr Matthews for the right forearm  Check a fasting LDL 6 weeks after starting the repatha  See me for the wellness after September 25th  
29-Aug-2018 19:19

## 2024-03-07 NOTE — TELEPHONE ENCOUNTER
Pt referral, office note, demographics and insurance card faxed to Dr. Matthews office. Pt is scheduled there on 3/21/24 @11:00am

## 2024-03-07 NOTE — PROGRESS NOTES
Conjunctivae normal.   Neck:      Trachea: No tracheal deviation.   Cardiovascular:      Rate and Rhythm: Normal rate and regular rhythm.      Heart sounds: Normal heart sounds.   Pulmonary:      Effort: Pulmonary effort is normal.      Breath sounds: Normal breath sounds.   Musculoskeletal:        Arms:    Skin:     General: Skin is warm and dry.      Findings: Rash (as mentioned above) present.   Neurological:      General: No focal deficit present.      Mental Status: He is alert.   Psychiatric:         Behavior: Behavior normal.      Blood pressure 130/74, pulse 68, resp. rate 16, height 1.867 m (6' 1.5\"), weight 96.2 kg (212 lb).          An electronic signature was used to authenticate this note.    --Goyo Desouza MD

## 2024-03-08 ENCOUNTER — TELEPHONE (OUTPATIENT)
Dept: INTERNAL MEDICINE | Age: 69
End: 2024-03-08

## 2024-03-08 ENCOUNTER — OFFICE VISIT (OUTPATIENT)
Dept: UROLOGY | Age: 69
End: 2024-03-08
Payer: COMMERCIAL

## 2024-03-08 VITALS — BODY MASS INDEX: 27.08 KG/M2 | HEIGHT: 74 IN | RESPIRATION RATE: 16 BRPM | WEIGHT: 211 LBS

## 2024-03-08 DIAGNOSIS — R97.20 ELEVATED PSA: Primary | ICD-10-CM

## 2024-03-08 LAB
BILIRUBIN URINE: NEGATIVE
BLOOD URINE, POC: NEGATIVE
CHARACTER, URINE: CLEAR
COLOR, URINE: YELLOW
GLUCOSE URINE: NEGATIVE MG/DL
KETONES, URINE: NEGATIVE
LEUKOCYTE CLUMPS, URINE: NEGATIVE
NITRITE, URINE: NEGATIVE
PH, URINE: 7 (ref 5–9)
PROTEIN, URINE: NEGATIVE MG/DL
SPECIFIC GRAVITY, URINE: 1.01 (ref 1–1.03)
UROBILINOGEN, URINE: 0.2 EU/DL (ref 0–1)

## 2024-03-08 PROCEDURE — 99213 OFFICE O/P EST LOW 20 MIN: CPT

## 2024-03-08 PROCEDURE — 81003 URINALYSIS AUTO W/O SCOPE: CPT

## 2024-03-08 PROCEDURE — 1123F ACP DISCUSS/DSCN MKR DOCD: CPT

## 2024-03-08 NOTE — TELEPHONE ENCOUNTER
Specialty Medication Service    Date: 3/8/2024  Patient's Name: Kaleb Mederos YOB: 1955            _____________________________________________________________________________________________    Left message to reschedule Medical Director  appointment for Specialty Medication Services. Please call: [phone] option 4. Will continue to outreach as appropriate. Rescheduling appointment that was on 04/01 due to scheduling error    Indy Garcia CPhT  Clinical   Specialty Medication Services  Phone: [phone] option #4  Fax: [phone]    For Pharmacy Admin Tracking Only    Program: [facility]  CPA in place:  Yes  Recommendation Provided To: Patient/Caregiver: 1 via Telephone  Intervention Detail: Scheduled Appointment  Intervention Accepted By: Patient/Caregiver: 0  Gap Closed?:    Time Spent (min): 10

## 2024-03-08 NOTE — PROGRESS NOTES
Character, Urine Clear [id number]         Patients recent PSA values are as follows  Lab Results   Component Value Date    PSA 11.66 (H) 03/06/2024    PSA 11.58 (H) 02/10/2024    PSA 6.90 (H) 09/21/2023    PSA 9.49 (H) 01/13/2023    PSA 12.36 (H) 03/04/2021    PSA 6.70 (H) 11/20/2015    PSA 5.90 (H) 05/22/2015    PSA 6.80 (H) 02/24/2015    PSA 5.32 (H) 04/01/2014    PSA 5.92 (H) 03/29/2013    PSA 3.78 (H) 12/15/2011        Recent BUN/Creatinine:  Lab Results   Component Value Date/Time    BUN 20 04/03/2023 08:33 AM    CREATININE 0.8 04/03/2023 08:33 AM       Radiology  No new urologic imaging    Assessment:   Elevated PSA  BPH  Erectile dysfunction  Hx of Painless Gross Hematuria     Plan:     Elevated PSA: Prostate MRI in February 2023 with overall PI-RADS 2 findings.  History of 2 negative biopsies, 1 in 2016 and the other in 2023. Patient's baseline PSA appears to be within 5-6.     Discussed recent elevation of PSA. Offered continued PSA monitoring, repeat prostate MRI, ExoDx testing, and biopsy. Patient would like to send his urine for ExoDx testing. Will call with abnormal results     BPH: S/p Greenlight PVP in 4/2023.      Erectile dysfunction: Currently with prescription for sildenafil from cardiology.  Also with prescription for nitroglycerin. Reports he does not use this medication     Hx of Gross Hematuria: Denies gross hematuria. Urine dip negative today        Miryam Diop PA-C  Urology

## 2024-03-08 NOTE — PATIENT INSTRUCTIONS
-I will send your urine today for additional testing and call with abnormal results  -Follow-up as scheduled  -Call with questions, comments, or concerns. I recommend going to the ED for further evaluation if you develop fever, chills, nausea, vomiting, chest pain, SOB, or calf pain.

## 2024-03-11 NOTE — TELEPHONE ENCOUNTER
Specialty Medication Service    Date: 3/11/2024  Patient's Name: Kaleb Mederos YOB: 1955            _____________________________________________________________________________________________    Patient returned call to [facility] Medical Director  appointment for Specialty Medication Services. Patient scheduled 04/01/24 at 920 am.      Otilia Paul CPhT  Pharmacy   Specialty Medication Services   Phone: [phone] option 4    For Pharmacy Admin Tracking Only    Program: [facility]  CPA in place:  Yes  Recommendation Provided To: Patient/Caregiver: 1 via Telephone  Intervention Detail: Scheduled Appointment  Intervention Accepted By: Patient/Caregiver: 1  Gap Closed?:    Time Spent (min): 15

## 2024-03-11 NOTE — TELEPHONE ENCOUNTER
Specialty Medication Service    Date: 3/11/2024  Patient's Name: Kaleb Mederos YOB: 1955            _____________________________________________________________________________________________    Left message to [facility] Medical Director  appointment for Specialty Medication Services. Please call: [phone] option 4. Will continue to outreach as appropriate. Leatha now open 04/01/24.    Otilia Paul CPhT  Pharmacy   Specialty Medication Services   Phone: [phone] option 4

## 2024-03-13 ENCOUNTER — TELEPHONE (OUTPATIENT)
Dept: FAMILY MEDICINE CLINIC | Age: 69
End: 2024-03-13

## 2024-03-13 ENCOUNTER — OFFICE VISIT (OUTPATIENT)
Dept: CARDIOLOGY CLINIC | Age: 69
End: 2024-03-13
Payer: COMMERCIAL

## 2024-03-13 ENCOUNTER — TELEPHONE (OUTPATIENT)
Dept: CARDIOLOGY CLINIC | Age: 69
End: 2024-03-13

## 2024-03-13 VITALS
SYSTOLIC BLOOD PRESSURE: 142 MMHG | HEART RATE: 61 BPM | HEIGHT: 75 IN | WEIGHT: 213 LBS | BODY MASS INDEX: 26.49 KG/M2 | DIASTOLIC BLOOD PRESSURE: 74 MMHG

## 2024-03-13 DIAGNOSIS — I10 PRIMARY HYPERTENSION: ICD-10-CM

## 2024-03-13 DIAGNOSIS — E78.01 FAMILIAL HYPERCHOLESTEROLEMIA: ICD-10-CM

## 2024-03-13 DIAGNOSIS — I25.10 CORONARY ARTERY DISEASE INVOLVING NATIVE CORONARY ARTERY OF NATIVE HEART WITHOUT ANGINA PECTORIS: Primary | ICD-10-CM

## 2024-03-13 PROCEDURE — 1123F ACP DISCUSS/DSCN MKR DOCD: CPT | Performed by: NUCLEAR MEDICINE

## 2024-03-13 PROCEDURE — 99214 OFFICE O/P EST MOD 30 MIN: CPT | Performed by: NUCLEAR MEDICINE

## 2024-03-13 PROCEDURE — 93000 ELECTROCARDIOGRAM COMPLETE: CPT | Performed by: NUCLEAR MEDICINE

## 2024-03-13 PROCEDURE — 3077F SYST BP >= 140 MM HG: CPT | Performed by: NUCLEAR MEDICINE

## 2024-03-13 PROCEDURE — 3078F DIAST BP <80 MM HG: CPT | Performed by: NUCLEAR MEDICINE

## 2024-03-13 RX ORDER — AZITHROMYCIN 250 MG/1
TABLET, FILM COATED ORAL
Qty: 6 TABLET | Refills: 0 | Status: SHIPPED | OUTPATIENT
Start: 2024-03-13 | End: 2024-03-23

## 2024-03-13 RX ORDER — NITROGLYCERIN 0.4 MG/1
0.4 TABLET SUBLINGUAL EVERY 5 MIN PRN
Qty: 25 TABLET | Refills: 3 | Status: SHIPPED | OUTPATIENT
Start: 2024-03-13

## 2024-03-13 NOTE — TELEPHONE ENCOUNTER
Pt states he forgot to ask Dr Huffman a question   He is having a tooth extraction on Monday and is asking if/how long he should hold his asa and effient?

## 2024-03-13 NOTE — PROGRESS NOTES
Patient here for check up.  EKG done today.   Patient hasn't started Repatha yet. He does has it. Waiting until he feels better.   
thickness mildly increased. Mild concentric LVH.    TRANSTHORACIC ECHOCARDIOGRAM  04/21/2008    Global LV dysfunction. EF 40%. Calcified AV w/o obvious stenosis. Mild MR and TR. Dilated ascending aorta.    TURP N/A 4/15/2023    Cystoscopy Greenlight Photo Vaporization performed by [facility]     Family History   Problem Relation Age of Onset    Hypertension Father     Stroke Father     Heart Disease Mother     Kidney Disease Mother      Social History     Tobacco Use    Smoking status: Former     Current packs/day: 0.00     Types: Cigarettes     Quit date: 4/21/2008     Years since quitting: 15.9    Smokeless tobacco: Never   Substance Use Topics    Alcohol use: Yes     Alcohol/week: 1.0 standard drink of alcohol     Types: 1 Shots of liquor per week     Comment: very little      Current Outpatient Medications   Medication Sig Dispense Refill    Budeson-Glycopyrrol-Formoterol (BREZTRI AEROSPHERE) 160-9-4.8 MCG/ACT AERO Inhale 2 puffs into the lungs in the morning and at bedtime 1 each 5    Evolocumab (REPATHA SURECLICK) 140 MG/ML SOAJ Inject 140 mg into the skin every 14 days 12 Adjustable Dose Pre-filled Pen Syringe 3    metoprolol tartrate (LOPRESSOR) 50 MG tablet Take 1 tablet by mouth 2 times daily 180 tablet 3    Misc. Devices (CPAP MACHINE) MISC by Does not apply route Please add EPR 2-3 for comfort 1 each 0    prasugrel (EFFIENT) 10 MG TABS TAKE ONE TABLET BY MOUTH ONE TIME A DAY 90 tablet 3    lisinopril (PRINIVIL;ZESTRIL) 10 MG tablet Take 1 tablet by mouth 2 times daily 180 tablet 3    fluticasone (FLONASE) 50 MCG/ACT nasal spray 1 spray by Each Nostril route daily 3 each 5    sildenafil (VIAGRA) 50 MG tablet Take 1 tablet by mouth daily as needed for Erectile Dysfunction 12 tablet 0    nitroGLYCERIN (NITROSTAT) 0.4 MG SL tablet Place 1 tablet under the tongue every 5 minutes as needed for Chest pain 25 tablet 3    Coenzyme Q10 (CO Q 10 PO) Take 200 mg by mouth      therapeutic

## 2024-03-13 NOTE — TELEPHONE ENCOUNTER
Pt called req ATB for  Sinus congestion,yellow mucus,sore throat from drainage,sinus pressure.    Pt had a negative covid    [facility]

## 2024-03-15 ENCOUNTER — TELEPHONE (OUTPATIENT)
Dept: UROLOGY | Age: 69
End: 2024-03-15

## 2024-03-15 DIAGNOSIS — R97.20 ELEVATED PSA: Primary | ICD-10-CM

## 2024-03-15 NOTE — TELEPHONE ENCOUNTER
Patient ExoDx study looks unremarkable.     However, would like him to repeat his PSA in 3 months rather than the previously discussed 6 months since his PSA did raise so significantly.     Can be VV    The patient should go to the ED should they develop fever, chills, nausea, vomiting, chest pain, SOB, calf pain, feelings of incomplete emptying, or should they otherwise feel they need evaluated

## 2024-03-15 NOTE — TELEPHONE ENCOUNTER
Patient advised of the Exo DX results, repeat PSA in 3 months. He voiced understanding and appointment moved up.

## 2024-04-01 ENCOUNTER — PHARMACY VISIT (OUTPATIENT)
Dept: INTERNAL MEDICINE | Age: 69
End: 2024-04-01

## 2024-04-01 DIAGNOSIS — E78.01 FAMILIAL HYPERCHOLESTEROLEMIA: Primary | ICD-10-CM

## 2024-04-01 PROCEDURE — 1111F DSCHRG MED/CURRENT MED MERGE: CPT | Performed by: INTERNAL MEDICINE

## 2024-04-01 PROCEDURE — 99999 PR OFFICE/OUTPT VISIT,PROCEDURE ONLY: CPT | Performed by: INTERNAL MEDICINE

## 2024-04-01 NOTE — PROGRESS NOTES
Initial Specialty Medication Virtual Visit  [facility]  [facility]  [address] [place] [address]  Dept: [phone]  Dept Fax: [phone]  Date of patient's visit: 4/1/2024  Patient's Name:  Kaleb Mederos YOB: 1955            Patient Care Team:  Goyo Desouza MD as PCP - General (Family Medicine)  Goyo Desouza MD as PCP - Empaneled Provider  Dann Prakash MD as Cardiologist (Cardiology)  ================================================================    REASON FOR VISIT/CHIEF COMPLAINT:  Medication Management    HISTORY OF PRESENTING ILLNESS:  Kaleb Mederos is 69 y.o. is here for initial virtual visit for specialty medication.    Specialty Medication: Repatha 140mg/ml  Frequency: every 14 days  Indication: Familial Hyperlipidemia  Initially Diagnosed:   Specialist: Cardiology  Last Specialist Visit: 2024  Side effects includes: none reported   Current symptoms include: none  Kaleb Mederos has no new complain today.   Recent blood work done on 02/2024 are reviewed.        DIAGNOSTIC FINDINGS:  CBC:  Lab Results   Component Value Date/Time    WBC 5.7 04/03/2023 08:33 AM    HGB 15.9 04/03/2023 08:33 AM     04/03/2023 08:33 AM       BMP:    Lab Results   Component Value Date/Time     04/03/2023 08:33 AM    K 4.6 04/03/2023 08:33 AM    K 4.0 12/10/2020 04:45 PM     04/03/2023 08:33 AM    CO2 28 04/03/2023 08:33 AM    BUN 20 04/03/2023 08:33 AM    CREATININE 0.8 04/03/2023 08:33 AM    GLUCOSE 106 07/07/2023 10:00 AM    GLUCOSE 93 12/15/2011 09:40 AM       HEMOGLOBIN A1C: No results found for: \"LABA1C\"    FASTING LIPID PANEL:  Lab Results   Component Value Date    CHOL 155 07/07/2023    HDL 49 07/07/2023    TRIG 82 07/07/2023       No results found for: \"JUAN\"    Lab Results   Component Value Date    HEPCAB Negative 10/05/2023           Patient Active Problem List   Diagnosis    Coronary artery disease involving native

## 2024-06-14 ENCOUNTER — NURSE ONLY (OUTPATIENT)
Dept: LAB | Age: 69
End: 2024-06-14

## 2024-06-14 DIAGNOSIS — R97.20 ELEVATED PSA: ICD-10-CM

## 2024-06-14 LAB — PSA SERPL-MCNC: 8.63 NG/ML (ref 0–1)

## 2024-06-17 ENCOUNTER — OFFICE VISIT (OUTPATIENT)
Dept: UROLOGY | Age: 69
End: 2024-06-17
Payer: COMMERCIAL

## 2024-06-17 VITALS — WEIGHT: 209 LBS | RESPIRATION RATE: 20 BRPM | HEIGHT: 75 IN | BODY MASS INDEX: 25.99 KG/M2

## 2024-06-17 DIAGNOSIS — R97.20 ELEVATED PSA: Primary | ICD-10-CM

## 2024-06-17 PROCEDURE — 1123F ACP DISCUSS/DSCN MKR DOCD: CPT

## 2024-06-17 PROCEDURE — 99213 OFFICE O/P EST LOW 20 MIN: CPT

## 2024-06-17 NOTE — PATIENT INSTRUCTIONS
Call if you need anything   Call with questions, comments, or concerns. I recommend going to the ED for further evaluation if you develop fever, chills, nausea, vomiting, chest pain, SOB, or calf pain.

## 2024-06-17 NOTE — PROGRESS NOTES
MCG/ACT AERO Inhale 2 puffs into the lungs in the morning and at bedtime 1 each 5    Evolocumab (REPATHA SURECLICK) 140 MG/ML SOAJ Inject 140 mg into the skin every 14 days 12 Adjustable Dose Pre-filled Pen Syringe 3    metoprolol tartrate (LOPRESSOR) 50 MG tablet Take 1 tablet by mouth 2 times daily 180 tablet 3    Misc. Devices (CPAP MACHINE) MISC by Does not apply route Please add EPR 2-3 for comfort 1 each 0    prasugrel (EFFIENT) 10 MG TABS TAKE ONE TABLET BY MOUTH ONE TIME A DAY 90 tablet 3    lisinopril (PRINIVIL;ZESTRIL) 10 MG tablet Take 1 tablet by mouth 2 times daily 180 tablet 3    fluticasone (FLONASE) 50 MCG/ACT nasal spray 1 spray by Each Nostril route daily 3 each 5    sildenafil (VIAGRA) 50 MG tablet Take 1 tablet by mouth daily as needed for Erectile Dysfunction 12 tablet 0    Coenzyme Q10 (CO Q 10 PO) Take 200 mg by mouth      therapeutic multivitamin-minerals (THERAGRAN-M) tablet Take 1 tablet by mouth daily      aspirin 81 MG EC tablet Take 1 tablet by mouth every other day       No current facility-administered medications for this visit.       Past Medical History  Kaleb  has a past medical history of Bleeding tendency (HCC), CAD (coronary artery disease), Chest pain, COVID-19, Fatigue, HLD (hyperlipidemia), Hypertension, MI (myocardial infarction) (HCC), Snores, and SOB (shortness of breath).    Past Surgical History  The patient  has a past surgical history that includes transthoracic echocardiogram (03/31/2011); Cardiac catheterization (04/21/2008); Coronary artery bypass graft (04/21/2008); Tonsillectomy; hernia repair; knee surgery (1989); cardiovascular stress test (04/01/2011); transthoracic echocardiogram (04/21/2008); Coronary angioplasty with stent (06/27/2013); Coronary angioplasty with stent (10/30/2016); and TURP (N/A, 4/15/2023).    Family History  This patient's family history includes Heart Disease in his mother; Hypertension in his father; Kidney Disease in his mother; Stroke

## 2024-06-18 ENCOUNTER — TELEPHONE (OUTPATIENT)
Dept: INTERNAL MEDICINE | Age: 69
End: 2024-06-18

## 2024-06-18 DIAGNOSIS — I25.119 CORONARY ARTERY DISEASE WITH ANGINA PECTORIS, UNSPECIFIED VESSEL OR LESION TYPE, UNSPECIFIED WHETHER NATIVE OR TRANSPLANTED HEART (HCC): ICD-10-CM

## 2024-06-18 DIAGNOSIS — E78.00 PURE HYPERCHOLESTEROLEMIA: ICD-10-CM

## 2024-06-18 NOTE — TELEPHONE ENCOUNTER
Specialty Medication Service    Date: 6/18/2024  Patient's Name: Kaleb Mederos YOB: 1955            _____________________________________________________________________________________________    Kaleb Mederos is a 69 y.o. male enrolled in the Specialty Medication Service.     We received a refill request for Repatha on 06/18/24.     Original Rx was written for 6 fills on 12/21/23.     Thank you,    Indy Garcia      Requested Prescriptions     Pending Prescriptions Disp Refills    Evolocumab (REPATHA SURECLICK) 140 MG/ML SOAJ 6 Adjustable Dose Pre-filled Pen Syringe 6     Sig: Inject 140 mg into the skin every 14 days

## 2024-06-19 ENCOUNTER — TELEPHONE (OUTPATIENT)
Dept: FAMILY MEDICINE CLINIC | Age: 69
End: 2024-06-19

## 2024-06-19 DIAGNOSIS — I25.119 CORONARY ARTERY DISEASE WITH ANGINA PECTORIS, UNSPECIFIED VESSEL OR LESION TYPE, UNSPECIFIED WHETHER NATIVE OR TRANSPLANTED HEART (HCC): Primary | ICD-10-CM

## 2024-06-19 DIAGNOSIS — I10 ESSENTIAL HYPERTENSION: ICD-10-CM

## 2024-06-19 RX ORDER — EVOLOCUMAB 140 MG/ML
140 INJECTION, SOLUTION SUBCUTANEOUS
Qty: 6 ADJUSTABLE DOSE PRE-FILLED PEN SYRINGE | Refills: 0 | Status: SHIPPED | OUTPATIENT
Start: 2024-06-19

## 2024-06-19 NOTE — TELEPHONE ENCOUNTER
Pt called to req an lab test to check his cholesterol before he start's his second round of Repatha     Please call pt once addressed [phone]    Pt uses 's lab

## 2024-06-19 NOTE — TELEPHONE ENCOUNTER
Specialty Medication Service    Date: 6/19/2024  Patient's Name: Kaleb Mederos YOB: 1955            _____________________________________________________________________________________________     Kaleb Mederos is a 69 y.o.  male enrolled in the Specialty Medication Service program. Refill request received for Repatha.    Patient does have active CPA on file.   Patient last SMS pharmacist visit was within the last 6 months.  Patient last medical director visit was within the last year.  Patient has seen their specialist within the last year.   Labs were reviewed.   SMS medical director referral is on file: yes  Next SMS visit is scheduled 8/6/2024.    Chart reviewed. No significant concerns noted, patient is compliant with the program.     Will approve the refill for 1+0 refills to get to next [facility] appointment     Orders Placed This Encounter    Evolocumab (REPATHA SURECLICK) 140 MG/ML SOAJ     Sig: Inject 140 mg into the skin every 14 days     Dispense:  6 Adjustable Dose Pre-filled Pen Syringe     Refill:  0          Augusto Jacobson PharmD [facility]  Ambulatory Clinical Pharmacist  Specialty Medication Services  Phone: [phone]  Fax: [phone]     For Pharmacy Admin Tracking Only    Program: [facility]  CPA in place:  Yes  Recommendation Provided To: Patient/Caregiver: 1 via Telephone  Intervention Detail: Refill(s) Provided  Intervention Accepted By: Patient/Caregiver: 1  Time Spent (min): 15

## 2024-07-01 ENCOUNTER — NURSE ONLY (OUTPATIENT)
Dept: LAB | Age: 69
End: 2024-07-01

## 2024-07-01 DIAGNOSIS — I10 ESSENTIAL HYPERTENSION: ICD-10-CM

## 2024-07-01 LAB — LDLC SERPL DIRECT ASSAY-MCNC: 78.52 MG/DL

## 2024-07-06 DIAGNOSIS — E78.00 PURE HYPERCHOLESTEROLEMIA: Primary | ICD-10-CM

## 2024-07-06 NOTE — RESULT ENCOUNTER NOTE
The LDL is better from before but it ideally would be in the 50's.  Check the fasting LDL in 6 weeks.

## 2024-07-08 ENCOUNTER — TELEPHONE (OUTPATIENT)
Dept: FAMILY MEDICINE CLINIC | Age: 69
End: 2024-07-08

## 2024-07-08 NOTE — TELEPHONE ENCOUNTER
----- Message from Goyo Desouza MD sent at 7/6/2024  2:49 PM EDT -----  The LDL is better from before but it ideally would be in the 50's.  Check the fasting LDL in 6 weeks.

## 2024-08-06 ENCOUNTER — TELEPHONE (OUTPATIENT)
Dept: INTERNAL MEDICINE | Age: 69
End: 2024-08-06

## 2024-08-06 NOTE — TELEPHONE ENCOUNTER
Specialty Medication Service    Date: 8/6/2024  Patient's Name: Kaleb Mederos YOB: 1955            _____________________________________________________________________________________________    Patient no showed scheduled PharmD appointment for Specialty Medication Services. Please call: [phone] option 4.  Left voicemail  to reschedule appointment. Will continue to outreach as appropriate.    Augusto Jacobson, PharmD [facility]  Ambulatory Clinical Pharmacist  Specialty Medication Services  Phone: [phone]  Fax: [phone]    For Pharmacy Admin Tracking Only    Program: [employer]  CPA in place:  Yes  Time Spent (min): 30

## 2024-08-08 ENCOUNTER — TELEPHONE (OUTPATIENT)
Dept: INTERNAL MEDICINE | Age: 69
End: 2024-08-08

## 2024-08-08 NOTE — TELEPHONE ENCOUNTER
Specialty Medication Service    Date: 8/8/2024  Patient's Name: Kaleb Mederos YOB: 1955            _____________________________________________________________________________________________    Spoke with patient to reschedule PharmD follow up appointment for Specialty Medication Services. Patient scheduled 08/12/24 at 12 pm.      Otilia Paul CPhT  Pharmacy   Specialty Medication Services   Phone: [phone] option 4    For Pharmacy Admin Tracking Only    Program: [facility]  CPA in place:  Yes  Recommendation Provided To: Patient/Caregiver: 1 via Telephone  Intervention Detail: Scheduled Appointment  Intervention Accepted By: Patient/Caregiver: 1  Gap Closed?:    Time Spent (min): 15

## 2024-08-12 ENCOUNTER — PHARMACY VISIT (OUTPATIENT)
Dept: INTERNAL MEDICINE | Age: 69
End: 2024-08-12

## 2024-08-12 DIAGNOSIS — E78.00 PURE HYPERCHOLESTEROLEMIA: ICD-10-CM

## 2024-08-12 DIAGNOSIS — I25.119 CORONARY ARTERY DISEASE WITH ANGINA PECTORIS, UNSPECIFIED VESSEL OR LESION TYPE, UNSPECIFIED WHETHER NATIVE OR TRANSPLANTED HEART (HCC): ICD-10-CM

## 2024-08-12 RX ORDER — EVOLOCUMAB 140 MG/ML
140 INJECTION, SOLUTION SUBCUTANEOUS
Qty: 6 ADJUSTABLE DOSE PRE-FILLED PEN SYRINGE | Refills: 1 | Status: SHIPPED | OUTPATIENT
Start: 2024-08-12

## 2024-08-12 NOTE — PROGRESS NOTES
this time  Keep all scheduled appointments. Return to clinic in 6 months or call with any questions or concerns.     Goyo White PharmD, [facility]  Ambulatory Clinical Pharmacist   [facility] Specialty Medication Service  Phone: [phone]  [facility]  Phone: [phone] option 1    For Pharmacy Admin Tracking Only    Program: SMS  CPA in place:  Yes  Recommendation Provided To: Patient/Caregiver: 2 via Virtual Visit  Intervention Detail: Refill(s) Provided and Scheduled Appointment  Intervention Accepted By: Patient/Caregiver: 2  Time Spent (min): 60    Kaleb DECKER Gatito was evaluated through a synchronous (real-time) audio encounter. Patient identification was verified at the start of the visit. He (or guardian if applicable) is aware that this is a billable service, which includes applicable co-pays. This visit was conducted with the patient's (and/or legal guardian's) verbal consent. He has not had a related appointment within my department in the past 7 days or scheduled within the next 24 hours.   The patient was located at Home: [address] [place] [address].  The provider was located at Home (Appt Dept State): [place].    Note: not billable if this call serves to triage the patient into an appointment for the relevant concern

## 2024-08-14 ASSESSMENT — PATIENT HEALTH QUESTIONNAIRE - PHQ9
1. LITTLE INTEREST OR PLEASURE IN DOING THINGS: NOT AT ALL
SUM OF ALL RESPONSES TO PHQ QUESTIONS 1-9: 0
2. FEELING DOWN, DEPRESSED OR HOPELESS: NOT AT ALL
SUM OF ALL RESPONSES TO PHQ QUESTIONS 1-9: 0
SUM OF ALL RESPONSES TO PHQ9 QUESTIONS 1 & 2: 0
SUM OF ALL RESPONSES TO PHQ QUESTIONS 1-9: 0
SUM OF ALL RESPONSES TO PHQ QUESTIONS 1-9: 0

## 2024-08-21 LAB
CHOLEST SERPL-MCNC: 120 MG/DL (ref 0–199)
FASTING: YES
GLUCOSE SERPL-MCNC: 98 MG/DL (ref 74–109)
HDLC SERPL-MCNC: 54 MG/DL (ref 40–90)
LDLC SERPL CALC-MCNC: 50 MG/DL
TRIGL SERPL-MCNC: 81 MG/DL (ref 0–199)

## 2024-10-07 ENCOUNTER — OFFICE VISIT (OUTPATIENT)
Dept: PULMONOLOGY | Age: 69
End: 2024-10-07
Payer: COMMERCIAL

## 2024-10-07 VITALS
TEMPERATURE: 98.2 F | HEART RATE: 62 BPM | DIASTOLIC BLOOD PRESSURE: 80 MMHG | BODY MASS INDEX: 26.69 KG/M2 | WEIGHT: 208 LBS | OXYGEN SATURATION: 98 % | HEIGHT: 74 IN | SYSTOLIC BLOOD PRESSURE: 138 MMHG

## 2024-10-07 DIAGNOSIS — G47.33 OSA ON CPAP: Primary | ICD-10-CM

## 2024-10-07 PROCEDURE — 99213 OFFICE O/P EST LOW 20 MIN: CPT | Performed by: PHYSICIAN ASSISTANT

## 2024-10-07 PROCEDURE — 3079F DIAST BP 80-89 MM HG: CPT | Performed by: PHYSICIAN ASSISTANT

## 2024-10-07 PROCEDURE — 1123F ACP DISCUSS/DSCN MKR DOCD: CPT | Performed by: PHYSICIAN ASSISTANT

## 2024-10-07 PROCEDURE — 3075F SYST BP GE 130 - 139MM HG: CPT | Performed by: PHYSICIAN ASSISTANT

## 2024-10-07 ASSESSMENT — ENCOUNTER SYMPTOMS
SHORTNESS OF BREATH: 1
EYES NEGATIVE: 1
WHEEZING: 0
BACK PAIN: 0
COUGH: 0
DIARRHEA: 0
ALLERGIC/IMMUNOLOGIC NEGATIVE: 1
CHEST TIGHTNESS: 0
NAUSEA: 0
STRIDOR: 0

## 2024-10-07 NOTE — PROGRESS NOTES
Allergic/Immunologic: Negative.    Neurological: Negative.  Negative for dizziness and light-headedness.   Psychiatric/Behavioral: Negative.     All other systems reviewed and are negative.       Physical Exam:    BMI:  Body mass index is 26.71 kg/m².    Wt Readings from Last 3 Encounters:   10/07/24 94.3 kg (208 lb)   06/17/24 94.8 kg (209 lb)   03/13/24 96.6 kg (213 lb)     Weight stable / unchanged  Vitals: /80 (Site: Right Upper Arm, Position: Sitting, Cuff Size: Medium Adult)   Pulse 62   Temp 98.2 °F (36.8 °C) (Infrared)   Ht 1.88 m (6' 2\")   Wt 94.3 kg (208 lb)   SpO2 98%   BMI 26.71 kg/m²       Physical Exam  Constitutional:       Appearance: Normal appearance. He is normal weight.   HENT:      Head: Normocephalic and atraumatic.      Right Ear: External ear normal.      Left Ear: External ear normal.      Nose: Nose normal.   Eyes:      Extraocular Movements: Extraocular movements intact.      Conjunctiva/sclera: Conjunctivae normal.      Pupils: Pupils are equal, round, and reactive to light.   Pulmonary:      Effort: Pulmonary effort is normal.   Musculoskeletal:      Cervical back: Normal range of motion and neck supple.   Neurological:      General: No focal deficit present.      Mental Status: He is alert and oriented to person, place, and time.   Psychiatric:         Attention and Perception: Attention and perception normal.         Mood and Affect: Mood and affect normal.         Speech: Speech normal.         Behavior: Behavior normal. Behavior is cooperative.         Thought Content: Thought content normal.         Cognition and Memory: Cognition normal.         Judgment: Judgment normal.           ASSESSMENT/DIAGNOSIS     Diagnosis Orders   1. REANNA on CPAP                 Plan   Do you need any equipment today? Yes update supplies  - Download reviewed and discussed with patient  - He  was advised to continue current positive airway pressure therapy with above described pressure.   -

## 2024-10-30 ENCOUNTER — TELEPHONE (OUTPATIENT)
Dept: FAMILY MEDICINE CLINIC | Age: 69
End: 2024-10-30

## 2024-10-30 RX ORDER — AZITHROMYCIN 250 MG/1
TABLET, FILM COATED ORAL
Qty: 6 TABLET | Refills: 0 | Status: SHIPPED | OUTPATIENT
Start: 2024-10-30 | End: 2024-11-09

## 2024-10-30 NOTE — TELEPHONE ENCOUNTER
Kaleb called and has not been feeling well:    Symptoms:  When did Symptoms start:  Yesterday  Taking any OTC Medications to help Symptoms:  No  Covid Test: No   Are you able to do a Virtual Appointment if your PCP Recommends that: No     Pharmacy: Patient called with complaints of:    Cough: Yes   SOB: No   Runny Nose: No   Sore Throat: Yes   Headache: No   Body Aches: No   Fever: No   Fatigue:  No    - Exposure to the Flu: No   - Travel History: No   - Duration of Symptoms:     Please send RX to: [address]    Patient informed if symptoms do not improve and/or  worsen then patient needs to go to Urgent Care or ER.     Pt did not want to schedule an appt just wants Zpak

## 2024-11-15 ENCOUNTER — HOSPITAL ENCOUNTER (OUTPATIENT)
Dept: MRI IMAGING | Age: 69
Discharge: HOME | End: 2024-11-15
Attending: ORTHOPAEDIC SURGERY
Payer: COMMERCIAL

## 2024-11-15 DIAGNOSIS — R22.31 MASS OF RIGHT FOREARM: ICD-10-CM

## 2024-11-15 LAB — POC CREATININE WHOLE BLOOD: 1 MG/DL (ref 0.5–1.2)

## 2024-11-15 PROCEDURE — 73223 MRI JOINT UPR EXTR W/O&W/DYE: CPT

## 2024-11-15 PROCEDURE — 82565 ASSAY OF CREATININE: CPT

## 2024-11-15 PROCEDURE — 6360000004 HC RX CONTRAST MEDICATION: Performed by: ORTHOPAEDIC SURGERY

## 2024-11-15 PROCEDURE — A9579 GAD-BASE MR CONTRAST NOS,1ML: HCPCS | Performed by: ORTHOPAEDIC SURGERY

## 2024-11-15 RX ADMIN — GADOTERIDOL 20 ML: 279.3 INJECTION, SOLUTION INTRAVENOUS at 14:17

## 2024-11-26 ENCOUNTER — TELEPHONE (OUTPATIENT)
Dept: FAMILY MEDICINE CLINIC | Age: 69
End: 2024-11-26

## 2024-11-26 NOTE — TELEPHONE ENCOUNTER
Pt called to req another atb he is having cough headache coughing up mucus is clear     Please send to [address]

## 2024-12-06 ENCOUNTER — OFFICE VISIT (OUTPATIENT)
Dept: CARDIOLOGY CLINIC | Age: 69
End: 2024-12-06
Payer: COMMERCIAL

## 2024-12-06 VITALS
HEART RATE: 61 BPM | WEIGHT: 212.4 LBS | HEIGHT: 74 IN | BODY MASS INDEX: 27.26 KG/M2 | SYSTOLIC BLOOD PRESSURE: 158 MMHG | DIASTOLIC BLOOD PRESSURE: 68 MMHG

## 2024-12-06 DIAGNOSIS — R06.02 SHORTNESS OF BREATH: ICD-10-CM

## 2024-12-06 DIAGNOSIS — I10 PRIMARY HYPERTENSION: ICD-10-CM

## 2024-12-06 DIAGNOSIS — E78.01 FAMILIAL HYPERCHOLESTEROLEMIA: ICD-10-CM

## 2024-12-06 DIAGNOSIS — Z01.818 PRE-OP EXAMINATION: Primary | ICD-10-CM

## 2024-12-06 DIAGNOSIS — I25.810 CORONARY ARTERY DISEASE INVOLVING CORONARY BYPASS GRAFT OF NATIVE HEART WITHOUT ANGINA PECTORIS: ICD-10-CM

## 2024-12-06 PROCEDURE — 99214 OFFICE O/P EST MOD 30 MIN: CPT | Performed by: NUCLEAR MEDICINE

## 2024-12-06 PROCEDURE — 3078F DIAST BP <80 MM HG: CPT | Performed by: NUCLEAR MEDICINE

## 2024-12-06 PROCEDURE — 3077F SYST BP >= 140 MM HG: CPT | Performed by: NUCLEAR MEDICINE

## 2024-12-06 PROCEDURE — 1123F ACP DISCUSS/DSCN MKR DOCD: CPT | Performed by: NUCLEAR MEDICINE

## 2024-12-06 PROCEDURE — 93000 ELECTROCARDIOGRAM COMPLETE: CPT | Performed by: NUCLEAR MEDICINE

## 2024-12-06 NOTE — PROGRESS NOTES
Cardiac clearance.    EKG done today.    Reports intermittent shortness of breath and edema in his lower extremities.    Denies chest pain, palpitations, and dizziness.

## 2024-12-06 NOTE — PROGRESS NOTES
[facility]  [facility]  [address]  [address]  [facility] [place] [address]  Dept: [phone]  Dept Fax: [phone]  Loc: [phone]    Visit Date: 12/6/2024    Kaleb Mederos is a 69 y.o. male who presents todayfor:  Chief Complaint   Patient presents with    Cardiac Clearance     Cardiac clearance.     Hypertension    Coronary Artery Disease    Hyperlipidemia   Going for lipoma surgery   Known CABG and stents  Some more dyspnea  Exertional at times  Some chest discomfort at times   Was treated for some URI lately   Some cough after that   On ABx  We discussed a cath last time  He wanted medical Rx   Known high risk patient  BP is stable  No dizziness  No syncope  On statins for hyperlipidemia  No issues with the meds       HPI:  HPI  Past Medical History:   Diagnosis Date    Bleeding tendency (HCC)     Patient states \"From medications\"    CAD (coronary artery disease)     Chest pain     COVID-19 12/2020    Fatigue     HLD (hyperlipidemia)     Hypertension     MI (myocardial infarction) (HCC)     Snores     SOB (shortness of breath)       Past Surgical History:   Procedure Laterality Date    CARDIAC CATHETERIZATION  04/21/2008    Significant vasculopathy w/ severe disease in the coronary arteries, pretty much aneurysmal and diffuse in nature everywhere, involving pretty much every segment of coronary arteries. Critical stenosis of the left circ. which is likely to be culprit vessel. Significant aortic disease as well. Mild LV dysfunction.     CARDIOVASCULAR STRESS TEST  04/01/2011    Moderate inferolateral infarct w/o obvious significant stress induced ischemia. EF 58%. Mild wall motion abnormality.    CORONARY ANGIOPLASTY WITH STENT PLACEMENT  06/27/2013    CORONARY ANGIOPLASTY WITH STENT PLACEMENT  10/30/2016    3 stent by Dr Story    CORONARY ARTERY BYPASS GRAFT  04/21/2008    HERNIA REPAIR      at 7 years of age    KNEE SURGERY  1989    scope    TONSILLECTOMY

## 2024-12-09 DIAGNOSIS — J44.9 CHRONIC OBSTRUCTIVE PULMONARY DISEASE, UNSPECIFIED COPD TYPE (HCC): ICD-10-CM

## 2024-12-09 DIAGNOSIS — I10 ESSENTIAL HYPERTENSION: ICD-10-CM

## 2024-12-09 DIAGNOSIS — I25.119 CORONARY ARTERY DISEASE WITH ANGINA PECTORIS, UNSPECIFIED VESSEL OR LESION TYPE, UNSPECIFIED WHETHER NATIVE OR TRANSPLANTED HEART (HCC): ICD-10-CM

## 2024-12-09 RX ORDER — PRASUGREL 10 MG/1
TABLET, FILM COATED ORAL
Qty: 90 TABLET | Refills: 3 | Status: SHIPPED | OUTPATIENT
Start: 2024-12-09

## 2024-12-09 RX ORDER — METOPROLOL TARTRATE 50 MG
50 TABLET ORAL 2 TIMES DAILY
Qty: 180 TABLET | Refills: 3 | Status: SHIPPED | OUTPATIENT
Start: 2024-12-09

## 2024-12-09 RX ORDER — LISINOPRIL 10 MG/1
10 TABLET ORAL 2 TIMES DAILY
Qty: 180 TABLET | Refills: 3 | Status: SHIPPED | OUTPATIENT
Start: 2024-12-09

## 2024-12-09 RX ORDER — BUDESONIDE, GLYCOPYRROLATE, AND FORMOTEROL FUMARATE 160; 9; 4.8 UG/1; UG/1; UG/1
2 AEROSOL, METERED RESPIRATORY (INHALATION) 2 TIMES DAILY
Qty: 3 EACH | Refills: 3 | Status: SHIPPED | OUTPATIENT
Start: 2024-12-09

## 2024-12-09 NOTE — TELEPHONE ENCOUNTER
Date of last visit:  3/7/2024  Date of next visit:  Visit date not found    Requested Prescriptions     Pending Prescriptions Disp Refills    Budeson-Glycopyrrol-Formoterol (BREZTRI AEROSPHERE) 160-9-4.8 MCG/ACT AERO 1 each 5     Sig: Inhale 2 puffs into the lungs in the morning and at bedtime    lisinopril (PRINIVIL;ZESTRIL) 10 MG tablet 180 tablet 3     Sig: Take 1 tablet by mouth 2 times daily    metoprolol tartrate (LOPRESSOR) 50 MG tablet 180 tablet 3     Sig: Take 1 tablet by mouth 2 times daily    prasugrel (EFFIENT) 10 MG TABS 90 tablet 3     Sig: TAKE ONE TABLET BY MOUTH ONE TIME A DAY

## 2024-12-12 ENCOUNTER — HOSPITAL ENCOUNTER (OUTPATIENT)
Dept: NUCLEAR MEDICINE | Age: 69
Discharge: HOME | End: 2024-12-12
Attending: NUCLEAR MEDICINE
Payer: COMMERCIAL

## 2024-12-12 ENCOUNTER — HOSPITAL ENCOUNTER (OUTPATIENT)
Age: 69
Discharge: HOME | End: 2024-12-14
Attending: NUCLEAR MEDICINE
Payer: COMMERCIAL

## 2024-12-12 VITALS — BODY MASS INDEX: 27.21 KG/M2 | WEIGHT: 212 LBS | HEIGHT: 74 IN

## 2024-12-12 DIAGNOSIS — Z01.818 PRE-OP EXAMINATION: ICD-10-CM

## 2024-12-12 DIAGNOSIS — R06.02 SHORTNESS OF BREATH: ICD-10-CM

## 2024-12-12 DIAGNOSIS — I25.810 CORONARY ARTERY DISEASE INVOLVING CORONARY BYPASS GRAFT OF NATIVE HEART WITHOUT ANGINA PECTORIS: ICD-10-CM

## 2024-12-12 DIAGNOSIS — I10 PRIMARY HYPERTENSION: ICD-10-CM

## 2024-12-12 DIAGNOSIS — E78.01 FAMILIAL HYPERCHOLESTEROLEMIA: ICD-10-CM

## 2024-12-12 LAB
ECHO BSA: 2.24 M2
NUC STRESS EJECTION FRACTION: 49 %
STRESS BASELINE DIAS BP: 68 MMHG
STRESS BASELINE HR: 53 BPM
STRESS BASELINE SYS BP: 151 MMHG
STRESS ESTIMATED WORKLOAD: 1 METS
STRESS PEAK DIAS BP: 68 MMHG
STRESS PEAK SYS BP: 151 MMHG
STRESS PERCENT HR ACHIEVED: 49 %
STRESS POST PEAK HR: 73 BPM
STRESS RATE PRESSURE PRODUCT: NORMAL BPM*MMHG
STRESS STAGE 1 BP: NORMAL MMHG
STRESS STAGE 1 DURATION: 1 MIN:SEC
STRESS STAGE 1 HR: 58 BPM
STRESS STAGE 2 BP: NORMAL MMHG
STRESS STAGE 2 DURATION: 1 MIN:SEC
STRESS STAGE 2 HR: 73 BPM
STRESS STAGE 3 BP: NORMAL MMHG
STRESS STAGE 3 DURATION: 1 MIN:SEC
STRESS STAGE 3 HR: 69 BPM
STRESS STAGE RECOVERY 1 BP: NORMAL MMHG
STRESS STAGE RECOVERY 1 DURATION: 1 MIN:SEC
STRESS STAGE RECOVERY 1 HR: 67 BPM
STRESS STAGE RECOVERY 2 BP: NORMAL MMHG
STRESS STAGE RECOVERY 2 DURATION: 1 MIN:SEC
STRESS STAGE RECOVERY 2 HR: 70 BPM
STRESS STAGE RECOVERY 3 BP: NORMAL MMHG
STRESS STAGE RECOVERY 3 DURATION: 1 MIN:SEC
STRESS STAGE RECOVERY 3 HR: 67 BPM
STRESS STAGE RECOVERY 4 BP: NORMAL MMHG
STRESS STAGE RECOVERY 4 DURATION: 2 MIN:SEC
STRESS STAGE RECOVERY 4 HR: 64 BPM
STRESS TARGET HR: 149 BPM

## 2024-12-12 PROCEDURE — 93017 CV STRESS TEST TRACING ONLY: CPT

## 2024-12-12 PROCEDURE — 6360000002 HC RX W HCPCS: Performed by: NUCLEAR MEDICINE

## 2024-12-12 PROCEDURE — 3430000000 HC RX DIAGNOSTIC RADIOPHARMACEUTICAL: Performed by: NUCLEAR MEDICINE

## 2024-12-12 PROCEDURE — 78452 HT MUSCLE IMAGE SPECT MULT: CPT

## 2024-12-12 PROCEDURE — A9500 TC99M SESTAMIBI: HCPCS | Performed by: NUCLEAR MEDICINE

## 2024-12-12 RX ORDER — TETRAKIS(2-METHOXYISOBUTYLISOCYANIDE)COPPER(I) TETRAFLUOROBORATE 1 MG/ML
31.4 INJECTION, POWDER, LYOPHILIZED, FOR SOLUTION INTRAVENOUS
Status: COMPLETED | OUTPATIENT
Start: 2024-12-12 | End: 2024-12-12

## 2024-12-12 RX ORDER — TETRAKIS(2-METHOXYISOBUTYLISOCYANIDE)COPPER(I) TETRAFLUOROBORATE 1 MG/ML
9.1 INJECTION, POWDER, LYOPHILIZED, FOR SOLUTION INTRAVENOUS
Status: COMPLETED | OUTPATIENT
Start: 2024-12-12 | End: 2024-12-12

## 2024-12-12 RX ORDER — REGADENOSON 0.08 MG/ML
0.4 INJECTION, SOLUTION INTRAVENOUS
Status: COMPLETED | OUTPATIENT
Start: 2024-12-12 | End: 2024-12-12

## 2024-12-12 RX ADMIN — Medication 9.1 MILLICURIE: at 11:58

## 2024-12-12 RX ADMIN — Medication 31.4 MILLICURIE: at 12:56

## 2024-12-12 RX ADMIN — REGADENOSON 0.4 MG: 0.08 INJECTION, SOLUTION INTRAVENOUS at 12:54

## 2024-12-13 ENCOUNTER — HOSPITAL ENCOUNTER (OUTPATIENT)
Age: 69
Discharge: HOME | End: 2024-12-15
Attending: NUCLEAR MEDICINE
Payer: COMMERCIAL

## 2024-12-13 ENCOUNTER — TELEPHONE (OUTPATIENT)
Dept: CARDIOLOGY CLINIC | Age: 69
End: 2024-12-13

## 2024-12-13 DIAGNOSIS — E78.01 FAMILIAL HYPERCHOLESTEROLEMIA: ICD-10-CM

## 2024-12-13 DIAGNOSIS — I25.810 CORONARY ARTERY DISEASE INVOLVING CORONARY BYPASS GRAFT OF NATIVE HEART WITHOUT ANGINA PECTORIS: ICD-10-CM

## 2024-12-13 DIAGNOSIS — R06.02 SHORTNESS OF BREATH: ICD-10-CM

## 2024-12-13 DIAGNOSIS — I10 PRIMARY HYPERTENSION: ICD-10-CM

## 2024-12-13 DIAGNOSIS — Z01.818 PRE-OP EXAMINATION: ICD-10-CM

## 2024-12-13 LAB
ECHO AO ASC DIAM: 3.3 CM
ECHO AV CUSP MM: 2 CM
ECHO AV PEAK GRADIENT: 5 MMHG
ECHO AV PEAK VELOCITY: 1.1 M/S
ECHO AV VELOCITY RATIO: 0.91
ECHO LA AREA 2C: 19.9 CM2
ECHO LA AREA 4C: 20 CM2
ECHO LA DIAMETER: 3.9 CM
ECHO LA MAJOR AXIS: 5.7 CM
ECHO LA MINOR AXIS: 4.8 CM
ECHO LA VOL BP: 66 ML (ref 18–58)
ECHO LA VOL MOD A2C: 67 ML (ref 18–58)
ECHO LA VOL MOD A4C: 55 ML (ref 18–58)
ECHO LV E' LATERAL VELOCITY: 9.4 CM/S
ECHO LV E' SEPTAL VELOCITY: 6.6 CM/S
ECHO LV EJECTION FRACTION BIPLANE: 64 % (ref 55–100)
ECHO LV FRACTIONAL SHORTENING: 35 % (ref 28–44)
ECHO LV INTERNAL DIMENSION DIASTOLIC: 4.9 CM (ref 4.2–5.9)
ECHO LV INTERNAL DIMENSION SYSTOLIC: 3.2 CM
ECHO LV ISOVOLUMETRIC RELAXATION TIME (IVRT): 70 MS
ECHO LV IVSD: 1.2 CM (ref 0.6–1)
ECHO LV MASS 2D: 226.4 G (ref 88–224)
ECHO LV POSTERIOR WALL DIASTOLIC: 1.2 CM (ref 0.6–1)
ECHO LV RELATIVE WALL THICKNESS RATIO: 0.49
ECHO LVOT PEAK GRADIENT: 4 MMHG
ECHO LVOT PEAK VELOCITY: 1 M/S
ECHO MV A VELOCITY: 0.85 M/S
ECHO MV E DECELERATION TIME (DT): 257 MS
ECHO MV E VELOCITY: 0.71 M/S
ECHO MV E/A RATIO: 0.84
ECHO MV E/E' LATERAL: 7.55
ECHO MV E/E' RATIO (AVERAGED): 9.16
ECHO MV E/E' SEPTAL: 10.76
ECHO PV MAX VELOCITY: 1 M/S
ECHO PV PEAK GRADIENT: 4 MMHG
ECHO RV INTERNAL DIMENSION: 3.6 CM
ECHO RV TAPSE: 1.9 CM (ref 1.7–?)
ECHO TV E WAVE: 0.7 M/S

## 2024-12-13 PROCEDURE — 93306 TTE W/DOPPLER COMPLETE: CPT | Performed by: NUCLEAR MEDICINE

## 2024-12-13 PROCEDURE — 93306 TTE W/DOPPLER COMPLETE: CPT

## 2024-12-22 DIAGNOSIS — J44.9 CHRONIC OBSTRUCTIVE PULMONARY DISEASE, UNSPECIFIED COPD TYPE (HCC): ICD-10-CM

## 2024-12-22 RX ORDER — BUDESONIDE, GLYCOPYRROLATE, AND FORMOTEROL FUMARATE 160; 9; 4.8 UG/1; UG/1; UG/1
2 AEROSOL, METERED RESPIRATORY (INHALATION) 2 TIMES DAILY
Qty: 3 EACH | Refills: 3 | Status: SHIPPED | OUTPATIENT
Start: 2024-12-22 | End: 2025-12-17

## 2025-01-09 ENCOUNTER — TELEPHONE (OUTPATIENT)
Dept: INTERNAL MEDICINE | Age: 70
End: 2025-01-09

## 2025-01-09 NOTE — TELEPHONE ENCOUNTER
Specialty Medication Service    Date: 1/9/2025  Patient's Name: Kaleb Mederos YOB: 1955            _____________________________________________________________________________________________    PA for patient's Repatha submitted to [facility] for approval. [facility] key: [id number]. Will continue to monitor for PA determination.     Augusto Jacobson, PharmD [facility]  Ambulatory Clinical Pharmacist  Specialty Medication Services  Phone: [phone]  Fax: [phone]

## 2025-01-10 NOTE — TELEPHONE ENCOUNTER
Specialty Medication Service    Date: 1/10/2025  Patient's Name: Kaleb Mederos YOB: 1955            _____________________________________________________________________________________________    Patient's PA for Repatha has been approved through 2/8/2026. Approval letter uploaded in Media tab    Augusto Jacobson PharmD [facility]  Ambulatory Clinical Pharmacist  Specialty Medication Services  Phone: [phone]  Fax: [phone]    For Pharmacy Admin Tracking Only    Program: SMS  CPA in place:  Yes  Recommendation Provided To: Other: 1  Intervention Detail: Benefit Assistance  Intervention Accepted By: Other: 1  Time Spent (min): 15

## 2025-01-16 ENCOUNTER — TELEPHONE (OUTPATIENT)
Dept: INTERNAL MEDICINE | Age: 70
End: 2025-01-16

## 2025-01-16 NOTE — TELEPHONE ENCOUNTER
Specialty Medication Service    Date: 1/16/2025  Patient's Name: Kaleb Mederos YOB: 1955            _____________________________________________________________________________________________    Spoke with patient to schedule PharmD follow up appointment for Specialty Medication Services. Patient scheduled 02/12/25 at 9 am.  Most recent office notes from [place] in patient chart.    Otilia Paul CPhT  Pharmacy   Specialty Medication Services   Phone: [phone] option 4      For Pharmacy Admin Tracking Only    Program: [facility]  CPA in place:  Yes  Recommendation Provided To: Patient/Caregiver: 1 via Telephone  Intervention Detail: Scheduled Appointment  Intervention Accepted By: Patient/Caregiver: 1  Gap Closed?:    Time Spent (min): 15

## 2025-02-05 ENCOUNTER — HOSPITAL ENCOUNTER (OUTPATIENT)
Dept: OCCUPATIONAL THERAPY | Age: 70
Setting detail: THERAPIES SERIES
Discharge: HOME | End: 2025-02-05
Payer: COMMERCIAL

## 2025-02-05 PROCEDURE — 97165 OT EVAL LOW COMPLEX 30 MIN: CPT

## 2025-02-05 NOTE — PROGRESS NOTES
Specific Interventions Next Treatment: fabrication of low profile radial nerve palsy splint; strengthening, e-stim for wrist/finger extension, ROM    Activity/Treatment Tolerance:  [x]  Patient tolerated treatment well  []  Patient limited by fatigue  []  Patient limited by pain   []  Patient limited by other medical complications  []  Other:     Assessment: Patient meets criteria for low complexity evaluation based on documented evaluation findings. Patient presents with significantly decreased active motion of right wrist, thumb, and R2-4 MP extension. Patient also presents with soreness in right forearm along with decreased strength of right hand. Skilled OT services are required to address appropriate splinting of right UE (radial nerve splint for daytime and resting hand for night wear), ROM, and strengthening of right UE to allow patient to be able to return to his normal routine.   Areas for Improvement: impaired coordination, impaired ROM, impaired strength, and impaired ADL  Prognosis: good    GOALS:  Patient Goal: Play the guitar. Use all of my tools normally.    Short Term Goals:  Time Frame: 4 weeks  Be independent with HEP as instructed to increase his ability to use right hand for daily tasks.  Be independent with splint wear/care to allow patient to use right hand to perform meal preparation tasks.  Increase active right wrist extension to at least 25 to increase his ability to safely use tools.  Increase active R1 MP extension to (-)25, YOUNG of R2 to 170, R3 to 170, R4 to 200, and R5 to 215 to increase his ability to hold onto steering wheel.  Increase right  strength to 40# with wrist in neutral to increase ability to eventually complete yard work.    Long Term Goals:  Time Frame: 20 weeks  Be able to use right hand to adjust heat/air in car without difficulty.  Be able to use right hand to strum guitar with no more than occasional difficulty.   Be able to use hammer and other simple tools in 
FAMILY HISTORY:  Father  Still living? Unknown  Family history of anemia, Age at diagnosis: Age Unknown    Sibling  Still living? Unknown  Family history of anemia, Age at diagnosis: Age Unknown    Child  Still living? Unknown  Family history of anemia, Age at diagnosis: Age Unknown

## 2025-02-06 ENCOUNTER — HOSPITAL ENCOUNTER (OUTPATIENT)
Dept: OCCUPATIONAL THERAPY | Age: 70
Setting detail: THERAPIES SERIES
Discharge: HOME | End: 2025-02-06
Payer: COMMERCIAL

## 2025-02-06 PROCEDURE — L3806 WHFO W/JOINT(S) CUSTOM FAB: HCPCS

## 2025-02-06 PROCEDURE — L3808 WHFO, RIGID W/O JOINTS: HCPCS

## 2025-02-06 NOTE — PROGRESS NOTES
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  he has a past surgical history that includes transthoracic echocardiogram (2011); Cardiac catheterization (2008); Coronary artery bypass graft (2008); Tonsillectomy; hernia repair; knee surgery (); cardiovascular stress test (2011); transthoracic echocardiogram (2008); Coronary angioplasty with stent (2013); Coronary angioplasty with stent (10/30/2016); and TURP (N/A, 4/15/2023).     Allergies Allergies   Allergen Reactions    Codeine      Heart racing    Darvon [Propoxyphene Hcl]      Heart racing    Statins      Muscle aches      Medications   Current Outpatient Medications:     Budeson-Glycopyrrol-Formoterol (BREZTRI AEROSPHERE) 160-9-4.8 MCG/ACT AERO, Inhale 2 puffs into the lungs in the morning and at bedtime, Disp: 3 each, Rfl: 3    lisinopril (PRINIVIL;ZESTRIL) 10 MG tablet, Take 1 tablet by mouth 2 times

## 2025-02-11 ENCOUNTER — HOSPITAL ENCOUNTER (OUTPATIENT)
Dept: OCCUPATIONAL THERAPY | Age: 70
Setting detail: THERAPIES SERIES
Discharge: HOME | End: 2025-02-11
Payer: COMMERCIAL

## 2025-02-11 PROCEDURE — 97760 ORTHOTIC MGMT&TRAING 1ST ENC: CPT

## 2025-02-11 NOTE — PROGRESS NOTES
[facility]  OCCUPATIONAL THERAPY  [] EVALUATION  [x] DAILY NOTE (LAND) [] DAILY NOTE (AQUATIC ) [] PROGRESS NOTE [] DISCHARGE NOTE    [x] OUTPATIENT REHABILITATION [facility]   [] [facility] CARE [place]    [] [facility]   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  he has a past surgical history that includes transthoracic echocardiogram (2011); Cardiac catheterization (2008); Coronary artery bypass graft (2008); Tonsillectomy; hernia repair; knee surgery (); cardiovascular stress test (2011); transthoracic echocardiogram (2008); Coronary angioplasty with stent (2013); Coronary angioplasty with stent (10/30/2016); and TURP (N/A, 4/15/2023).     Allergies Allergies   Allergen Reactions    Codeine      Heart racing    Darvon [Propoxyphene Hcl]      Heart racing    Statins      Muscle aches      Medications   Current Outpatient Medications:     Budeson-Glycopyrrol-Formoterol (BREZTRI AEROSPHERE) 160-9-4.8 MCG/ACT AERO, Inhale 2 puffs into the lungs in the morning and at bedtime, Disp: 3 each, Rfl: 3    lisinopril (PRINIVIL;ZESTRIL) 10 MG tablet, Take 1 tablet by mouth 2 times

## 2025-02-12 ENCOUNTER — PHARMACY VISIT (OUTPATIENT)
Dept: INTERNAL MEDICINE | Age: 70
End: 2025-02-12

## 2025-02-12 DIAGNOSIS — E78.00 PURE HYPERCHOLESTEROLEMIA: ICD-10-CM

## 2025-02-12 DIAGNOSIS — I25.119 CORONARY ARTERY DISEASE WITH ANGINA PECTORIS, UNSPECIFIED VESSEL OR LESION TYPE, UNSPECIFIED WHETHER NATIVE OR TRANSPLANTED HEART (HCC): ICD-10-CM

## 2025-02-12 PROCEDURE — APPNB30 APP NON BILLABLE TIME 0-30 MINS

## 2025-02-12 RX ORDER — ACETAMINOPHEN 500 MG
500 TABLET ORAL EVERY 6 HOURS PRN
COMMUNITY

## 2025-02-12 RX ORDER — EVOLOCUMAB 140 MG/ML
140 INJECTION, SOLUTION SUBCUTANEOUS
Qty: 6 ADJUSTABLE DOSE PRE-FILLED PEN SYRINGE | Refills: 1 | Status: SHIPPED | OUTPATIENT
Start: 2025-02-12

## 2025-02-12 ASSESSMENT — PROMIS GLOBAL HEALTH SCALE
SUM OF RESPONSES TO QUESTIONS 3, 6, 7, & 8: 10
IN GENERAL, PLEASE RATE HOW WELL YOU CARRY OUT YOUR USUAL SOCIAL ACTIVITIES (INCLUDES ACTIVITIES AT HOME, AT WORK, AND IN YOUR COMMUNITY, AND RESPONSIBILITIES AS A PARENT, CHILD, SPOUSE, EMPLOYEE, FRIEND, ETC) [ON A SCALE OF 1 (POOR) TO 5 (EXCELLENT)]?: GOOD
IN GENERAL, WOULD YOU SAY YOUR HEALTH IS...[ON A SCALE OF 1 (POOR) TO 5 (EXCELLENT)]: GOOD
IN THE PAST 7 DAYS, HOW WOULD YOU RATE YOUR FATIGUE ON AVERAGE [ON A SCALE FROM 1 (NONE) TO 5 (VERY SEVERE)]?: MODERATE
IN THE PAST 7 DAYS, HOW OFTEN HAVE YOU BEEN BOTHERED BY EMOTIONAL PROBLEMS, SUCH AS FEELING ANXIOUS, DEPRESSED, OR IRRITABLE [ON A SCALE FROM 1 (NEVER) TO 5 (ALWAYS)]?: NEVER
IN GENERAL, HOW WOULD YOU RATE YOUR SATISFACTION WITH YOUR SOCIAL ACTIVITIES AND RELATIONSHIPS [ON A SCALE OF 1 (POOR) TO 5 (EXCELLENT)]?: VERY GOOD
TO WHAT EXTENT ARE YOU ABLE TO CARRY OUT YOUR EVERYDAY PHYSICAL ACTIVITIES SUCH AS WALKING, CLIMBING STAIRS, CARRYING GROCERIES, OR MOVING A CHAIR [ON A SCALE OF 1 (NOT AT ALL) TO 5 (COMPLETELY)]?: MODERATELY
IN GENERAL, WOULD YOU SAY YOUR QUALITY OF LIFE IS...[ON A SCALE OF 1 (POOR) TO 5 (EXCELLENT)]: VERY GOOD
IN GENERAL, HOW WOULD YOU RATE YOUR MENTAL HEALTH, INCLUDING YOUR MOOD AND YOUR ABILITY TO THINK [ON A SCALE OF 1 (POOR) TO 5 (EXCELLENT)]?: VERY GOOD
IN THE PAST 7 DAYS, HOW WOULD YOU RATE YOUR PAIN ON AVERAGE [ON A SCALE FROM 0 (NO PAIN) TO 10 (WORST IMAGINABLE PAIN)]?: 1
IN GENERAL, HOW WOULD YOU RATE YOUR PHYSICAL HEALTH [ON A SCALE OF 1 (POOR) TO 5 (EXCELLENT)]?: GOOD
SUM OF RESPONSES TO QUESTIONS 2, 4, 5, & 10: 17

## 2025-02-13 ENCOUNTER — HOSPITAL ENCOUNTER (OUTPATIENT)
Dept: OCCUPATIONAL THERAPY | Age: 70
Setting detail: THERAPIES SERIES
Discharge: HOME | End: 2025-02-13
Payer: COMMERCIAL

## 2025-02-13 PROCEDURE — 97014 ELECTRIC STIMULATION THERAPY: CPT

## 2025-02-13 PROCEDURE — 97110 THERAPEUTIC EXERCISES: CPT

## 2025-02-13 PROCEDURE — 97760 ORTHOTIC MGMT&TRAING 1ST ENC: CPT

## 2025-02-13 NOTE — PROGRESS NOTES
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  he has a past surgical history that includes transthoracic echocardiogram (2011); Cardiac catheterization (2008); Coronary artery bypass graft (2008); Tonsillectomy; hernia repair; knee surgery (); cardiovascular stress test (2011); transthoracic echocardiogram (2008); Coronary angioplasty with stent (2013); Coronary angioplasty with stent (10/30/2016); and TURP (N/A, 4/15/2023).     Allergies Allergies   Allergen Reactions    Codeine      Heart racing    Darvon [Propoxyphene Hcl]      Heart racing    Statins      Muscle aches      Medications   Current Outpatient Medications:     acetaminophen (TYLENOL) 500 MG tablet, Take 1 tablet by mouth every 6 hours as needed for Pain, Disp: , Rfl:     Evolocumab (REPATHA SURECLICK) 140 MG/ML SOAJ, Inject 140 mg into the skin every 14 days, Disp: 6 Adjustable Dose

## 2025-02-14 NOTE — PROGRESS NOTES
I have discussed the care of this patient with the resident and I agree with the above as documented.     Goyo White, HarperD, [facility]  Ambulatory Clinical Pharmacist   [facility] Specialty Medication Service  Phone: [phone]  [facility]  Phone: [phone] option 1    
3    metoprolol tartrate (LOPRESSOR) 50 MG tablet Take 1 tablet by mouth 2 times daily 180 tablet 3    prasugrel (EFFIENT) 10 MG TABS TAKE ONE TABLET BY MOUTH ONE TIME A DAY 90 tablet 3    Evolocumab (REPATHA SURECLICK) 140 MG/ML SOAJ Inject 140 mg into the skin every 14 days 6 Adjustable Dose Pre-filled Pen Syringe 1    nitroGLYCERIN (NITROSTAT) 0.4 MG SL tablet Place 1 tablet under the tongue every 5 minutes as needed for Chest pain 25 tablet 3    fluticasone (FLONASE) 50 MCG/ACT nasal spray 1 spray by Each Nostril route daily 3 each 5    sildenafil (VIAGRA) 50 MG tablet Take 1 tablet by mouth daily as needed for Erectile Dysfunction 12 tablet 0    Coenzyme Q10 (CO Q 10 PO) Take 200 mg by mouth daily      therapeutic multivitamin-minerals (THERAGRAN-M) tablet Take 1 tablet by mouth daily      aspirin 81 MG EC tablet Take 1 tablet by mouth every other day      Misc. Devices (CPAP MACHINE) MISC by Does not apply route Please add EPR 2-3 for comfort 1 each 0     No current facility-administered medications for this visit.     Current Specialty Medication:   Evolocumab (Repatha)  Homozygous familial hypercholesterolemia: SubQ: 420 mg once monthly.  Hyperlipidemia, primary: SubQ: 140 mg every 2 weeks or 420 mg once monthly.  Prevention of cardiovascular events in patients with established cardiovascular disease: SubQ: 140 mg every 2 weeks or 420 mg once monthly.  Warnings/Precautions: Hypersensitivity reactions  Recommended monitoring: Lipid profile (baseline, 4-8 weeks after start, then every 3-12 months after)  Storage: Store refrigerated at 2°C to 8°C (36°F to 46°F) in the original carton. Alternatively, can be kept at room temperature at 68°F to 77°F (20°C to 25°C) in the original carton  Handing: Must be used within 30 days if stored at room temperature. If not used within the 30 days, discard REPATHA.  Protect REPATHA from direct light and do not expose to temperatures above 25°C (77°F). Do not use beyond date

## 2025-02-17 ENCOUNTER — TELEPHONE (OUTPATIENT)
Dept: INTERNAL MEDICINE | Age: 70
End: 2025-02-17

## 2025-02-17 NOTE — TELEPHONE ENCOUNTER
Specialty Medication Service    Date: 2/17/2025  Patient's Name: Kaleb Mederos YOB: 1955            _____________________________________________________________________________________________    Left message to [facility] Medical Director  appointment for Specialty Medication Services. Please call: [phone] option 4. Will continue to outreach as appropriate. Leatha out  April 2nd and 3rd needs to be moved.      Otilia Paul CPhT  Pharmacy   Specialty Medication Services   Phone: [phone] option 4

## 2025-02-18 ENCOUNTER — HOSPITAL ENCOUNTER (OUTPATIENT)
Dept: OCCUPATIONAL THERAPY | Age: 70
Setting detail: THERAPIES SERIES
Discharge: HOME | End: 2025-02-18
Payer: COMMERCIAL

## 2025-02-18 PROCEDURE — 97110 THERAPEUTIC EXERCISES: CPT

## 2025-02-18 PROCEDURE — 97014 ELECTRIC STIMULATION THERAPY: CPT

## 2025-02-18 NOTE — PROGRESS NOTES
[facility]  OCCUPATIONAL THERAPY  [] EVALUATION  [x] DAILY NOTE (LAND) [] DAILY NOTE (AQUATIC ) [] PROGRESS NOTE [] DISCHARGE NOTE    [x] OUTPATIENT REHABILITATION CENTER [facility]   [] [facility] CARE [place]    [] 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  he has a past surgical history that includes transthoracic echocardiogram (2011); Cardiac catheterization (2008); Coronary artery bypass graft (2008); Tonsillectomy; hernia repair; knee surgery (); cardiovascular stress test (2011); transthoracic echocardiogram (2008); Coronary angioplasty with stent (2013); Coronary angioplasty with stent (10/30/2016); and TURP (N/A, 4/15/2023).     Allergies Allergies   Allergen Reactions    Codeine      Heart racing    Darvon [Propoxyphene Hcl]      Heart racing    Statins      Muscle aches      Medications   Current Outpatient Medications:     acetaminophen (TYLENOL) 500 MG tablet, Take 1 tablet by mouth every 6 hours as needed for Pain, Disp: , Rfl:     Evolocumab (REPATHA SURECLICK) 140 MG/ML SOAJ, Inject 140 mg into the skin every 14 days, Disp: 6 Adjustable Dose

## 2025-02-19 NOTE — TELEPHONE ENCOUNTER
Specialty Medication Service    Date: 2/19/2025  Patient's Name: Kaleb Mederos YOB: 1955            _____________________________________________________________________________________________    Spoke with patient to [facility] Medical Director  appointment for Specialty Medication Services. Patient scheduled 03/27/25 @1020      Indy Garcia CPhT  Clinical   Specialty Medication Services  Phone: [phone] option #4  Fax: [phone]    For Pharmacy Admin Tracking Only    Program: [facility]  CPA in place:  Yes  Recommendation Provided To: Patient/Caregiver: 1 via Telephone  Intervention Detail: Scheduled Appointment  Intervention Accepted By: Patient/Caregiver: 1  Gap Closed?:    Time Spent (min): 10

## 2025-02-19 NOTE — TELEPHONE ENCOUNTER
Specialty Medication Service    Date: 2/19/2025  Patient's Name: Kaleb Mederos YOB: 1955            _____________________________________________________________________________________________    Left message and Sent text message to [facility] Medical Director  appointment for Specialty Medication Services. Please call: [phone] option 4. Will continue to outreach as appropriate.     Otilia Paul CPhT  Pharmacy   Specialty Medication Services   Phone: [phone] option 4

## 2025-02-20 ENCOUNTER — HOSPITAL ENCOUNTER (OUTPATIENT)
Dept: OCCUPATIONAL THERAPY | Age: 70
Setting detail: THERAPIES SERIES
Discharge: HOME | End: 2025-02-20
Payer: COMMERCIAL

## 2025-02-20 PROCEDURE — 97014 ELECTRIC STIMULATION THERAPY: CPT

## 2025-02-20 PROCEDURE — 97110 THERAPEUTIC EXERCISES: CPT

## 2025-02-20 NOTE — PROGRESS NOTES
[facility]  OCCUPATIONAL THERAPY  [] EVALUATION  [x] DAILY NOTE (LAND) [] DAILY NOTE (AQUATIC ) [] PROGRESS NOTE [] DISCHARGE NOTE    [x] OUTPATIENT REHABILITATION CENTER [facility]   [] [facility] CARE [place]    [] [facility]   [] 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  he has a past surgical history that includes transthoracic echocardiogram (2011); Cardiac catheterization (2008); Coronary artery bypass graft (2008); Tonsillectomy; hernia repair; knee surgery (); cardiovascular stress test (2011); transthoracic echocardiogram (2008); Coronary angioplasty with stent (2013); Coronary angioplasty with stent (10/30/2016); and TURP (N/A, 4/15/2023).     Allergies Allergies   Allergen Reactions    Codeine      Heart racing    Darvon [Propoxyphene Hcl]      Heart racing    Statins      Muscle aches      Medications   Current Outpatient Medications:     acetaminophen (TYLENOL) 500 MG tablet, Take 1 tablet by mouth every 6 hours as needed for Pain, Disp: , Rfl:     Evolocumab (REPATHA SURECLICK) 140 MG/ML SOAJ, Inject 140 mg into the skin every 14 days, Disp: 6 Adjustable Dose Pre-filled Pen

## 2025-02-25 ENCOUNTER — HOSPITAL ENCOUNTER (OUTPATIENT)
Dept: OCCUPATIONAL THERAPY | Age: 70
Setting detail: THERAPIES SERIES
Discharge: HOME | End: 2025-02-25
Payer: COMMERCIAL

## 2025-02-25 PROCEDURE — 97110 THERAPEUTIC EXERCISES: CPT

## 2025-02-25 PROCEDURE — 97014 ELECTRIC STIMULATION THERAPY: CPT

## 2025-02-25 NOTE — PROGRESS NOTES
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  he has a past surgical history that includes transthoracic echocardiogram (2011); Cardiac catheterization (2008); Coronary artery bypass graft (2008); Tonsillectomy; hernia repair; knee surgery (); cardiovascular stress test (2011); transthoracic echocardiogram (2008); Coronary angioplasty with stent (2013); Coronary angioplasty with stent (10/30/2016); and TURP (N/A, 4/15/2023).     Allergies Allergies   Allergen Reactions    Codeine      Heart racing    Darvon [Propoxyphene Hcl]      Heart racing    Statins      Muscle aches      Medications   Current Outpatient Medications:     acetaminophen (TYLENOL) 500 MG tablet, Take 1 tablet by mouth every 6 hours as needed for Pain, Disp: , Rfl:     Evolocumab (REPATHA SURECLICK) 140 MG/ML SOAJ, Inject 140 mg into the skin every 14 days, Disp: 6 Adjustable Dose Pre-filled Pen

## 2025-02-26 ENCOUNTER — HOSPITAL ENCOUNTER (OUTPATIENT)
Dept: OCCUPATIONAL THERAPY | Age: 70
Setting detail: THERAPIES SERIES
Discharge: HOME | End: 2025-02-26
Payer: COMMERCIAL

## 2025-02-26 PROCEDURE — 97014 ELECTRIC STIMULATION THERAPY: CPT

## 2025-02-26 PROCEDURE — 97110 THERAPEUTIC EXERCISES: CPT

## 2025-02-26 NOTE — PROGRESS NOTES
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  he has a past surgical history that includes transthoracic echocardiogram (2011); Cardiac catheterization (2008); Coronary artery bypass graft (2008); Tonsillectomy; hernia repair; knee surgery (); cardiovascular stress test (2011); transthoracic echocardiogram (2008); Coronary angioplasty with stent (2013); Coronary angioplasty with stent (10/30/2016); and TURP (N/A, 4/15/2023).     Allergies Allergies   Allergen Reactions    Codeine      Heart racing    Darvon [Propoxyphene Hcl]      Heart racing    Statins      Muscle aches      Medications   Current Outpatient Medications:     acetaminophen (TYLENOL) 500 MG tablet, Take 1 tablet by mouth every 6 hours as needed for Pain, Disp: , Rfl:     Evolocumab (REPATHA SURECLICK) 140 MG/ML SOAJ, Inject 140 mg into the skin every 14 days, Disp: 6 Adjustable Dose Pre-filled Pen

## 2025-03-04 ENCOUNTER — HOSPITAL ENCOUNTER (OUTPATIENT)
Dept: OCCUPATIONAL THERAPY | Age: 70
Setting detail: THERAPIES SERIES
Discharge: HOME | End: 2025-03-04
Payer: COMMERCIAL

## 2025-03-04 PROCEDURE — 97110 THERAPEUTIC EXERCISES: CPT

## 2025-03-04 PROCEDURE — 97014 ELECTRIC STIMULATION THERAPY: CPT

## 2025-03-04 PROCEDURE — 97140 MANUAL THERAPY 1/> REGIONS: CPT

## 2025-03-04 NOTE — PROGRESS NOTES
weeks  Be independent with HEP as instructed to increase his ability to use right hand for daily tasks.  Be independent with splint wear/care to allow patient to use right hand to perform meal preparation tasks.  Increase active right wrist extension to at least 25 to increase his ability to safely use tools.  Increase active R1 MP extension to (-)25, YOUNG of R2 to 170, R3 to 170, R4 to 200, and R5 to 215 to increase his ability to hold onto steering wheel.  Increase right  strength to 40# with wrist in neutral to increase ability to eventually complete yard work.    Long Term Goals:  Time Frame: 20 weeks  Be able to use right hand to adjust heat/air in car without difficulty.  Be able to use right hand to strum guitar with no more than occasional difficulty.   Be able to use hammer and other simple tools in right hand without difficulty.  Be able to don glove on right hand without difficulty.     Patient Education:   [x]  HEP/Education Completed: Plan of Care, Goals, plan for radial nerve palsy splint; prayer stretch, thumb opposition with neutral wrist  2/6/25: wear/care of splints fabricated during session  2/25/25: how to use home NMES unit  []  No new Education completed  [x]  Encouraged pt. To turn his home estim unit down      [x]  Patient verbalized and/or demonstrated understanding of education provided.  []  Patient unable to verbalize and/or demonstrate understanding of education provided.  Will continue education.  [x]  Barriers to learning: none    PLAN:      []  Plan of care initiated.  Plan to see patient 2  times per week for 20 weeks to address the treatment planned outlined above.  [x]  Continue with current plan of care  []  Modify plan of care as follows:    []  Hold pending physician visit  []  Discharge    Time In 0800   Time Out 0839   Timed Code Minutes: 24 min   Total Treatment Time: 39 min     Karen Edgar, OTR/L [id number]

## 2025-03-06 ENCOUNTER — HOSPITAL ENCOUNTER (OUTPATIENT)
Dept: OCCUPATIONAL THERAPY | Age: 70
Setting detail: THERAPIES SERIES
Discharge: HOME | End: 2025-03-06
Payer: COMMERCIAL

## 2025-03-06 PROCEDURE — 97014 ELECTRIC STIMULATION THERAPY: CPT

## 2025-03-06 PROCEDURE — 97110 THERAPEUTIC EXERCISES: CPT

## 2025-03-06 NOTE — PROGRESS NOTES
Notes/Comments   Prayer stretch for wrist extension  5     Passive stretching of right wrist for extension; forearm pronation/supination       Active motion    R1 MP 25-60, R1 IP 0-40, R2 MP extension = 55, R3 MP extension = 40, R4 MP extension = 25, R5 MP extension = 5   Wrist exerciser with 1# 1 5 x Attempted to use right hand as much as possible to complete this task; reported more of a work out with this versus plastic wrist twister   Green therabar  - twisting in both directions while holding in vertical position 1 10 x Difficulty noted with activity but continued   Green therabar  - bending bar with forearms supinated and with forearms pronated 2 15 each x    Brown hand gripper - 25# with right hand 2 10 x Focused on keeping wrist in neutral - improved ability to keep wrist in neutral   Supination bar with 2 washers 2 10 x Only from thumb up to palm up position -   Pronation bar with 0 washers and 2 nuts 2 10 x From thumb up to palm down position only   Wrist twister holding horizontally with minimal resistance for wrist extension 2 10     Arm over blue incline for wrist flexion, extension, UD/RD 1 10 ea     Coordination maze with right UE 1 10     Activities Time    Notes/Comments   Education regarding plan for low profile radial nerve palsy splint for daytime wear      Splint fabrication - see below   3/4/25  Cut new splint straps for pt's splints due to current straps not holding well   Splint adjustments made to low profile radial nerve palsy splint   Replaced moleskin on thumb and small finger; attached thumb loop with metal denilson to improve stability of thumb loop       Specific Interventions Next Treatment:  strengthening, e-stim for wrist/finger extension, ROM    Activity/Treatment Tolerance:  [x]  Patient tolerated treatment well  []  Patient limited by fatigue  []  Patient limited by pain   []  Patient limited by other medical complications  []  Other:     Assessment: Patient is progressing slowly

## 2025-03-11 ENCOUNTER — HOSPITAL ENCOUNTER (OUTPATIENT)
Dept: OCCUPATIONAL THERAPY | Age: 70
Setting detail: THERAPIES SERIES
Discharge: HOME | End: 2025-03-11
Payer: COMMERCIAL

## 2025-03-11 PROCEDURE — 97110 THERAPEUTIC EXERCISES: CPT

## 2025-03-11 NOTE — PROGRESS NOTES
[facility]  OCCUPATIONAL THERAPY  [] EVALUATION  [] DAILY NOTE (LAND) [] DAILY NOTE (AQUATIC ) [x] PROGRESS NOTE [] DISCHARGE NOTE    [x] OUTPATIENT REHABILITATION CENTER [facility]   [] [facility] CARE [place]    [] [facility]   [] 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  he has a past surgical history that includes transthoracic echocardiogram (2011); Cardiac catheterization (2008); Coronary artery bypass graft (2008); Tonsillectomy; hernia repair; knee surgery (); cardiovascular stress test (2011); transthoracic echocardiogram (2008); Coronary angioplasty with stent (2013); Coronary angioplasty with stent (10/30/2016); and TURP (N/A, 4/15/2023).     Allergies Allergies   Allergen Reactions    Codeine      Heart racing    Darvon [Propoxyphene Hcl]      Heart racing    Statins      Muscle aches      Medications   Current Outpatient Medications:     acetaminophen (TYLENOL) 500 MG tablet, Take 1 tablet by mouth every 6 hours as needed for Pain, Disp: , Rfl:     Evolocumab (REPATHA SURECLICK) 140 MG/ML SOAJ, Inject 140 mg into the skin every 14 days, Disp: 6 Adjustable Dose Pre-filled Pen

## 2025-03-13 ENCOUNTER — HOSPITAL ENCOUNTER (OUTPATIENT)
Dept: OCCUPATIONAL THERAPY | Age: 70
Setting detail: THERAPIES SERIES
Discharge: HOME | End: 2025-03-13
Payer: COMMERCIAL

## 2025-03-13 PROCEDURE — 97014 ELECTRIC STIMULATION THERAPY: CPT

## 2025-03-13 PROCEDURE — 97110 THERAPEUTIC EXERCISES: CPT

## 2025-03-13 NOTE — PROGRESS NOTES
Plan of care initiated.  Plan to see patient 2  times per week for 20 weeks to address the treatment planned outlined above.  [x]  Continue with current plan of care  []  Modify plan of care as follows:  To see patient 2 x week x 6 weeks and 1 x week x 9 weeks from 3/11/25  []  Hold pending physician visit  []  Discharge    Time In 0845   Time Out 0930   Timed Code Minutes: 30 min   Total Treatment Time: 45 min     Yasmeen Malin, OTR/L #[id number]

## 2025-03-18 ENCOUNTER — HOSPITAL ENCOUNTER (OUTPATIENT)
Dept: OCCUPATIONAL THERAPY | Age: 70
Setting detail: THERAPIES SERIES
Discharge: HOME | End: 2025-03-18
Payer: COMMERCIAL

## 2025-03-18 PROCEDURE — 97110 THERAPEUTIC EXERCISES: CPT

## 2025-03-18 PROCEDURE — 97014 ELECTRIC STIMULATION THERAPY: CPT

## 2025-03-18 NOTE — PROGRESS NOTES
[facility]  OCCUPATIONAL THERAPY  [] EVALUATION  [x] DAILY NOTE (LAND) [] DAILY NOTE (AQUATIC ) [] PROGRESS NOTE [] DISCHARGE NOTE    [x] OUTPATIENT REHABILITATION CENTER [facility]   [] 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  he has a past surgical history that includes transthoracic echocardiogram (2011); Cardiac catheterization (2008); Coronary artery bypass graft (2008); Tonsillectomy; hernia repair; knee surgery (); cardiovascular stress test (2011); transthoracic echocardiogram (2008); Coronary angioplasty with stent (2013); Coronary angioplasty with stent (10/30/2016); and TURP (N/A, 4/15/2023).     Allergies Allergies   Allergen Reactions    Codeine      Heart racing    Darvon [Propoxyphene Hcl]      Heart racing    Statins      Muscle aches      Medications   Current Outpatient Medications:     acetaminophen (TYLENOL) 500 MG tablet, Take 1 tablet by mouth every 6 hours as needed for Pain, Disp: , Rfl:     Evolocumab (REPATHA SURECLICK) 140 MG/ML SOAJ, Inject 140 mg into the skin every 14 days, Disp: 6 Adjustable Dose Pre-filled Pen

## 2025-03-20 ENCOUNTER — HOSPITAL ENCOUNTER (OUTPATIENT)
Dept: OCCUPATIONAL THERAPY | Age: 70
Setting detail: THERAPIES SERIES
Discharge: HOME | End: 2025-03-20
Payer: COMMERCIAL

## 2025-03-20 PROCEDURE — 97014 ELECTRIC STIMULATION THERAPY: CPT

## 2025-03-20 PROCEDURE — 97110 THERAPEUTIC EXERCISES: CPT

## 2025-03-20 NOTE — PROGRESS NOTES
5 Plan of care initiated.  Plan to see patient 2  times per week for 20 weeks to address the treatment planned outlined above.  [x]  Continue with current plan of care  []  Modify plan of care as follows:  To see patient 2 x week x 6 weeks and 1 x week x 9 weeks from 3/11/25  []  Hold pending physician visit  []  Discharge    Time In 0850   Time Out 0930   Timed Code Minutes: 25 min   Total Treatment Time: 40 min     Yasmeen Malin, OTR/L #[id number]

## 2025-03-25 ENCOUNTER — HOSPITAL ENCOUNTER (OUTPATIENT)
Dept: OCCUPATIONAL THERAPY | Age: 70
Setting detail: THERAPIES SERIES
Discharge: HOME | End: 2025-03-25
Payer: COMMERCIAL

## 2025-03-25 PROCEDURE — 97110 THERAPEUTIC EXERCISES: CPT

## 2025-03-25 PROCEDURE — 97760 ORTHOTIC MGMT&TRAING 1ST ENC: CPT

## 2025-03-25 NOTE — PROGRESS NOTES
[facility]  OCCUPATIONAL THERAPY  [] EVALUATION  [x] DAILY NOTE (LAND) [] DAILY NOTE (AQUATIC ) [] PROGRESS NOTE [] DISCHARGE NOTE    [x] OUTPATIENT REHABILITATION CENTER [facility]   [] [facility] CARE [place]    [] 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  he has a past surgical history that includes transthoracic echocardiogram (2011); Cardiac catheterization (2008); Coronary artery bypass graft (2008); Tonsillectomy; hernia repair; knee surgery (); cardiovascular stress test (2011); transthoracic echocardiogram (2008); Coronary angioplasty with stent (2013); Coronary angioplasty with stent (10/30/2016); and TURP (N/A, 4/15/2023).     Allergies Allergies   Allergen Reactions    Codeine      Heart racing    Darvon [Propoxyphene Hcl]      Heart racing    Statins      Muscle aches      Medications   Current Outpatient Medications:     acetaminophen (TYLENOL) 500 MG tablet, Take 1 tablet by mouth every 6 hours as needed for Pain, Disp: , Rfl:     Evolocumab (REPATHA SURECLICK) 140 MG/ML SOAJ, Inject 140 mg into the skin every 14 days, Disp: 6 Adjustable Dose Pre-filled Pen

## 2025-03-27 ENCOUNTER — PHARMACY VISIT (OUTPATIENT)
Dept: INTERNAL MEDICINE | Age: 70
End: 2025-03-27

## 2025-03-27 DIAGNOSIS — E78.01 FAMILIAL HYPERCHOLESTEROLEMIA: ICD-10-CM

## 2025-03-27 DIAGNOSIS — E78.00 PURE HYPERCHOLESTEROLEMIA: ICD-10-CM

## 2025-03-27 DIAGNOSIS — I25.119 CORONARY ARTERY DISEASE WITH ANGINA PECTORIS, UNSPECIFIED VESSEL OR LESION TYPE, UNSPECIFIED WHETHER NATIVE OR TRANSPLANTED HEART: Primary | ICD-10-CM

## 2025-03-28 ENCOUNTER — HOSPITAL ENCOUNTER (OUTPATIENT)
Dept: OCCUPATIONAL THERAPY | Age: 70
Setting detail: THERAPIES SERIES
Discharge: HOME | End: 2025-03-28
Payer: COMMERCIAL

## 2025-03-28 PROCEDURE — 97110 THERAPEUTIC EXERCISES: CPT

## 2025-03-28 NOTE — PROGRESS NOTES
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  he has a past surgical history that includes transthoracic echocardiogram (2011); Cardiac catheterization (2008); Coronary artery bypass graft (2008); Tonsillectomy; hernia repair; knee surgery (); cardiovascular stress test (2011); transthoracic echocardiogram (2008); Coronary angioplasty with stent (2013); Coronary angioplasty with stent (10/30/2016); and TURP (N/A, 4/15/2023).     Allergies Allergies   Allergen Reactions    Codeine      Heart racing    Darvon [Propoxyphene Hcl]      Heart racing    Statins      Muscle aches      Medications   Current Outpatient Medications:     acetaminophen (TYLENOL) 500 MG tablet, Take 1 tablet by mouth every 6 hours as needed for Pain, Disp: , Rfl:     Evolocumab (REPATHA SURECLICK) 140 MG/ML SOAJ, Inject 140 mg into the skin every 14 days, Disp: 6 Adjustable Dose Pre-filled Pen

## 2025-03-28 NOTE — PROGRESS NOTES
12/15/2011 09:40 AM       HEMOGLOBIN A1C: No results found for: \"LABA1C\"    FASTING LIPID PANEL:  Lab Results   Component Value Date    CHOL 120 08/21/2024    HDL 54 08/21/2024    TRIG 81 08/21/2024           PHYSICAL EXAM:  There were no vitals filed for this visit.  BP Readings from Last 3 Encounters:   12/06/24 (!) 158/68   10/07/24 138/80   03/13/24 (!) 142/74          ASSESSMENT AND PLAN:  Kaleb was seen today for medication check.    Diagnoses and all orders for this visit:    Coronary artery disease with angina pectoris, unspecified vessel or lesion type, unspecified whether native or transplanted heart  -     [facility] Medication Service    Pure hypercholesterolemia  -     [facility]ication [facility]    Familial hypercholesterolemia  -     [facility] Medication [facility]    C/w Repatha as prescribed    FOLLOW UP AND INSTRUCTIONS:  Follow up with in 6 months.     Kaleb received counseling on the following healthy behaviors: medication adherence    Discussed use, benefit, and side effects of prescribed medications.  Barriers to medication compliance addressed.  All patient questions answered.  Pt voiced understanding.    Michelle Stiles MD  [facility]  3/28/2025, 12:18 PM

## 2025-03-31 DIAGNOSIS — J31.0 CHRONIC RHINITIS: ICD-10-CM

## 2025-03-31 RX ORDER — FLUTICASONE PROPIONATE 50 MCG
1 SPRAY, SUSPENSION (ML) NASAL DAILY
Qty: 3 EACH | Refills: 5 | Status: SHIPPED | OUTPATIENT
Start: 2025-03-31

## 2025-03-31 NOTE — TELEPHONE ENCOUNTER
Date of last visit:  3/7/2024  Date of next visit:  Visit date not found    Requested Prescriptions     Pending Prescriptions Disp Refills    fluticasone (FLONASE) 50 MCG/ACT nasal spray 3 each 5     Si spray by Each Nostril route daily

## 2025-04-01 ENCOUNTER — HOSPITAL ENCOUNTER (OUTPATIENT)
Dept: OCCUPATIONAL THERAPY | Age: 70
Setting detail: THERAPIES SERIES
Discharge: HOME | End: 2025-04-01
Payer: COMMERCIAL

## 2025-04-01 PROCEDURE — 97110 THERAPEUTIC EXERCISES: CPT

## 2025-04-01 NOTE — PROGRESS NOTES
[facility]  OCCUPATIONAL THERAPY  [] EVALUATION  [x] DAILY NOTE (LAND) [] DAILY NOTE (AQUATIC ) [] PROGRESS NOTE [] DISCHARGE NOTE    [x] OUTPATIENT REHABILITATION CENTER [facility]   [] [facility] CARE [place]    [] 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  he has a past surgical history that includes transthoracic echocardiogram (2011); Cardiac catheterization (2008); Coronary artery bypass graft (2008); Tonsillectomy; hernia repair; knee surgery (); cardiovascular stress test (2011); transthoracic echocardiogram (2008); Coronary angioplasty with stent (2013); Coronary angioplasty with stent (10/30/2016); and TURP (N/A, 4/15/2023).     Allergies Allergies   Allergen Reactions    Codeine      Heart racing    Darvon [Propoxyphene Hcl]      Heart racing    Statins      Muscle aches      Medications   Current Outpatient Medications:     fluticasone (FLONASE) 50 MCG/ACT nasal spray, 1 spray by Each Nostril route daily, Disp: 3 each, Rfl: 5    acetaminophen (TYLENOL) 500 MG tablet, Take 1 tablet by mouth every 6 hours as needed for Pain, Disp: , Rfl:     Evolocumab (REPATHA

## 2025-04-03 ENCOUNTER — HOSPITAL ENCOUNTER (OUTPATIENT)
Dept: OCCUPATIONAL THERAPY | Age: 70
Setting detail: THERAPIES SERIES
Discharge: HOME | End: 2025-04-03
Payer: COMMERCIAL

## 2025-04-03 PROCEDURE — 97110 THERAPEUTIC EXERCISES: CPT

## 2025-04-03 NOTE — PROGRESS NOTES
3/4/25  Cut new splint straps for pt's splints due to current straps not holding well   Splint adjustments made to low profile radial nerve palsy splint   Replaced all finger loops and moleskin for improved and tighter fit for patient; patient related that it felt good at end of session     Specific Interventions Next Treatment:  strengthening, e-stim for wrist/finger extension, ROM    Activity/Treatment Tolerance:  [x]  Patient tolerated treatment well  []  Patient limited by fatigue  []  Patient limited by pain   []  Patient limited by other medical complications  []  Other:     Assessment:  Patient is progressing steadily toward goals. Patient has developed a rash and feels as though it is a result from wearing the splint.     GOALS:  Patient Goal: Play the guitar. Use all of my tools normally.    Short Term Goals:  Time Frame: 4 weeks  Be independent with HEP as instructed to increase his ability to use right hand for daily tasks.   Be independent with splint wear/care to allow patient to use right hand to perform meal preparation tasks.   Increase active right wrist extension to at least  30 to increase his ability to use tools in right hand   Increase active R1 MP extension to (-)25, YOUNG of R2 to 180, R3 to 190, R4 to 220, and R5 to 235 to increase his ability to hold onto and release steering wheel.   Increase right  strength to 40# with wrist in neutral to increase ability to eventually complete yard work.     Long Term Goals:  Time Frame: 15 weeks  Be able to use right hand to adjust heat/air in car without difficulty.   Be able to use right hand to strum guitar with no more than occasional difficulty.   Be able to use hammer and other simple tools in right hand without difficulty.   Be able to don glove on right hand without difficulty.     Patient Education:   []  HEP/Education Completed:   2/6/25: wear/care of splints fabricated during session  2/25/25: how to use home NMES unit  3/11/25: goal

## 2025-04-08 ENCOUNTER — HOSPITAL ENCOUNTER (OUTPATIENT)
Dept: OCCUPATIONAL THERAPY | Age: 70
Setting detail: THERAPIES SERIES
Discharge: HOME | End: 2025-04-08
Payer: COMMERCIAL

## 2025-04-08 PROCEDURE — 97110 THERAPEUTIC EXERCISES: CPT

## 2025-04-08 NOTE — PROGRESS NOTES
8/10 for PN; PN completed on 3/11/25 (Reporting Period: 3/13/25 to     )   Recertification Date 04/30/25   Physician Follow-Up Goes back in 2 months from 3/25   Physician Orders Evaluate and treat   History of Present Illness Patient states that he has had lipoma in right elbow for a few years. Patient states that he started having difficulty with moving right index and thumb finger maybe a year ago. Patient had lipoma removed on 1/8/25 and was put in post-op bandage. Patient relates that at a follow up appointment he had OT splint fabricated at 30 degrees wrist extension. Patient has been wearing that since that time. Was told at the last appointment that he didn't have to wear the splint if he didn't want to wear it. Patient relates that he feels more comfortable wearing the splint. Operative report indicates that lipoma was very large and was intertwined with branches of the radial nerve.      SUBJECTIVE:  Patient states that skin rash is improving quite a bit. Patient states that he played the guitar for about 15 minutes this morning. States that he thinks that his wrist movement is holding him back more than his finger motion. States that he continues to have difficulty with using his right hand to use a fork. States that he has started wearing the low profile splint at night.      OBJECTIVE:  TREATMENT   Precautions: HTN, hx of CABG; sc 1/8/25   Pain:  No pain, but relates there is discomfort LOCATION; left forearm    “X” in shaded column indicates Activity Completed Today   Modalities Parameters/  Location  Notes/Comments   NMES (patient's unit) on distal right forearm and dorsal right hand; synchronous mode 6 ramp. On 20, off 10, 300 width, 150 rate x 15 minutes; intensity 5.5   Completed for right dorsal forearm stimulation   NMES to right dorsal forearm and hand at intensity 30 for 15 minutes. - unattended            Manual Therapy Time/  Technique  Notes/Comments   STM to right flexor tendons  x

## 2025-04-10 ENCOUNTER — APPOINTMENT (OUTPATIENT)
Dept: OCCUPATIONAL THERAPY | Age: 70
End: 2025-04-10
Payer: COMMERCIAL

## 2025-04-15 ENCOUNTER — HOSPITAL ENCOUNTER (OUTPATIENT)
Dept: OCCUPATIONAL THERAPY | Age: 70
Setting detail: THERAPIES SERIES
Discharge: HOME | End: 2025-04-15
Payer: COMMERCIAL

## 2025-04-15 PROCEDURE — 97110 THERAPEUTIC EXERCISES: CPT

## 2025-04-15 PROCEDURE — 97140 MANUAL THERAPY 1/> REGIONS: CPT

## 2025-04-15 NOTE — PROGRESS NOTES
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  he has a past surgical history that includes transthoracic echocardiogram (2011); Cardiac catheterization (2008); Coronary artery bypass graft (2008); Tonsillectomy; hernia repair; knee surgery (); cardiovascular stress test (2011); transthoracic echocardiogram (2008); Coronary angioplasty with stent (2013); Coronary angioplasty with stent (10/30/2016); and TURP (N/A, 4/15/2023).     Allergies Allergies   Allergen Reactions    Codeine      Heart racing    Darvon [Propoxyphene Hcl]      Heart racing    Statins      Muscle aches      Medications   Current Outpatient Medications:     fluticasone (FLONASE) 50 MCG/ACT nasal spray, 1 spray by Each Nostril route daily, Disp: 3 each, Rfl: 5    acetaminophen (TYLENOL) 500 MG tablet, Take 1 tablet by mouth every 6 hours as needed for Pain, Disp: , Rfl:     Evolocumab (REPATHA

## 2025-04-17 ENCOUNTER — HOSPITAL ENCOUNTER (OUTPATIENT)
Dept: OCCUPATIONAL THERAPY | Age: 70
Setting detail: THERAPIES SERIES
Discharge: HOME | End: 2025-04-17
Payer: COMMERCIAL

## 2025-04-17 PROCEDURE — 97140 MANUAL THERAPY 1/> REGIONS: CPT

## 2025-04-17 PROCEDURE — 97110 THERAPEUTIC EXERCISES: CPT

## 2025-04-17 NOTE — PROGRESS NOTES
* PLEASE SIGN, DATE AND TIME CERTIFICATION BELOW AND RETURN TO [facility] OUTPATIENT REHABILITATION (FAX #: [phone]).  ATTEST/CO-SIGN IF ACCESSING VIA [employer].  THANK YOU.**    I certify that I have examined the patient below and determined that Physical Medicine and Rehabilitation service is necessary and that I approve the established plan of care for up to 90 days or as specifically noted.  Attestation, signature or co-signature of physician indicates approval of certification requirements.    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  he has a past surgical history that includes transthoracic echocardiogram (2011); Cardiac catheterization (2008); Coronary artery bypass graft (2008); Tonsillectomy; hernia repair; knee surgery (); cardiovascular stress test (2011); transthoracic echocardiogram

## 2025-04-22 ENCOUNTER — HOSPITAL ENCOUNTER (OUTPATIENT)
Dept: OCCUPATIONAL THERAPY | Age: 70
Setting detail: THERAPIES SERIES
Discharge: HOME | End: 2025-04-22
Payer: COMMERCIAL

## 2025-04-22 PROCEDURE — 97110 THERAPEUTIC EXERCISES: CPT

## 2025-04-22 NOTE — PROGRESS NOTES
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  he has a past surgical history that includes transthoracic echocardiogram (2011); Cardiac catheterization (2008); Coronary artery bypass graft (2008); Tonsillectomy; hernia repair; knee surgery (); cardiovascular stress test (2011); transthoracic echocardiogram (2008); Coronary angioplasty with stent (2013); Coronary angioplasty with stent (10/30/2016); and TURP (N/A, 4/15/2023).     Allergies Allergies   Allergen Reactions    Codeine      Heart racing    Darvon [Propoxyphene Hcl]      Heart racing    Statins      Muscle aches      Medications   Current Outpatient Medications:     fluticasone (FLONASE) 50 MCG/ACT nasal spray, 1 spray by Each Nostril route daily, Disp: 3 each, Rfl: 5    acetaminophen (TYLENOL) 500 MG tablet, Take 1 tablet by mouth every 6 hours as needed for Pain, Disp: , Rfl:     Evolocumab
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
## 2025-04-29 ENCOUNTER — HOSPITAL ENCOUNTER (OUTPATIENT)
Dept: OCCUPATIONAL THERAPY | Age: 70
Setting detail: THERAPIES SERIES
Discharge: HOME | End: 2025-04-29
Payer: COMMERCIAL

## 2025-04-29 PROCEDURE — 97110 THERAPEUTIC EXERCISES: CPT

## 2025-04-29 PROCEDURE — 97022 WHIRLPOOL THERAPY: CPT

## 2025-04-29 NOTE — PROGRESS NOTES
[facility]  OCCUPATIONAL THERAPY  [] EVALUATION  [x] DAILY NOTE (LAND) [] DAILY NOTE (AQUATIC ) [] PROGRESS NOTE [] DISCHARGE NOTE    [x] OUTPATIENT REHABILITATION [facility]   [] [facility] CARE [place]    [] 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  he has a past surgical history that includes transthoracic echocardiogram (2011); Cardiac catheterization (2008); Coronary artery bypass graft (2008); Tonsillectomy; hernia repair; knee surgery (); cardiovascular stress test (2011); transthoracic echocardiogram (2008); Coronary angioplasty with stent (2013); Coronary angioplasty with stent (10/30/2016); and TURP (N/A, 4/15/2023).     Allergies Allergies   Allergen Reactions    Codeine      Heart racing    Darvon [Propoxyphene Hcl]      Heart racing    Statins      Muscle aches      Medications   Current Outpatient Medications:     fluticasone (FLONASE) 50 MCG/ACT nasal spray, 1 spray by Each Nostril route daily, Disp: 3 each, Rfl: 5    acetaminophen (TYLENOL) 500 MG tablet, Take 1 tablet by mouth every 6 hours as needed for Pain, Disp: , Rfl:     Evolocumab

## 2025-05-06 ENCOUNTER — HOSPITAL ENCOUNTER (OUTPATIENT)
Dept: OCCUPATIONAL THERAPY | Age: 70
Setting detail: THERAPIES SERIES
Discharge: HOME | End: 2025-05-06
Payer: COMMERCIAL

## 2025-05-06 ENCOUNTER — APPOINTMENT (OUTPATIENT)
Dept: OCCUPATIONAL THERAPY | Age: 70
End: 2025-05-06
Payer: COMMERCIAL

## 2025-05-06 PROCEDURE — 97110 THERAPEUTIC EXERCISES: CPT

## 2025-05-06 PROCEDURE — 97112 NEUROMUSCULAR REEDUCATION: CPT

## 2025-05-06 NOTE — PROGRESS NOTES
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  he has a past surgical history that includes transthoracic echocardiogram (2011); Cardiac catheterization (2008); Coronary artery bypass graft (2008); Tonsillectomy; hernia repair; knee surgery (); cardiovascular stress test (2011); transthoracic echocardiogram (2008); Coronary angioplasty with stent (2013); Coronary angioplasty with stent (10/30/2016); and TURP (N/A, 4/15/2023).     Allergies Allergies   Allergen Reactions    Codeine      Heart racing    Darvon [Propoxyphene Hcl]      Heart racing    Statins      Muscle aches      Medications   Current Outpatient Medications:     fluticasone (FLONASE) 50 MCG/ACT nasal spray, 1 spray by Each Nostril route daily, Disp: 3 each, Rfl: 5    acetaminophen (TYLENOL) 500 MG tablet, Take 1 tablet by mouth every 6 hours as needed for Pain, Disp: , Rfl:     Evolocumab

## 2025-05-13 ENCOUNTER — HOSPITAL ENCOUNTER (OUTPATIENT)
Dept: OCCUPATIONAL THERAPY | Age: 70
Setting detail: THERAPIES SERIES
End: 2025-05-13
Payer: COMMERCIAL

## 2025-05-16 ENCOUNTER — HOSPITAL ENCOUNTER (OUTPATIENT)
Dept: OCCUPATIONAL THERAPY | Age: 70
Setting detail: THERAPIES SERIES
Discharge: HOME | End: 2025-05-16
Payer: COMMERCIAL

## 2025-05-16 PROCEDURE — 97110 THERAPEUTIC EXERCISES: CPT

## 2025-05-16 PROCEDURE — 97022 WHIRLPOOL THERAPY: CPT

## 2025-05-16 NOTE — PROGRESS NOTES
Coordination maze with right UE 1 10     Activities Time    Notes/Comments   In hand manipulation using practice golf ball   Palm to fingertip and fingertip to palm x 10    Information gathered for PN      Used right hand without splint to  and put plastic tapered pegs into pegboard      Splint fabrication - see below   3/4/25  Cut new splint straps for pt's splints due to current straps not holding well   Splint adjustments made to low profile radial nerve palsy splint   Replaced all finger loops and moleskin for improved and tighter fit for patient; patient related that it felt good at end of session   Thumb support for abducted extended position with splint strapping to help for guitar playing   x        Specific Interventions Next Treatment:  strengthening, ROM    Activity/Treatment Tolerance:  [x]  Patient tolerated treatment well  []  Patient limited by fatigue  []  Patient limited by pain   []  Patient limited by other medical complications  []  Other:     Assessment:  Pt voicing frustrations, however, pt motivated to complete whatever he can to get back to playing his guitar.    GOALS:  Patient Goal: Play the guitar. Use all of my tools normally.    Short Term Goals:  Time Frame: 4 weeks  Be independent with HEP as instructed to increase his ability to use right hand for daily tasks.    Increase right lateral pinch to 20# and 3 point pinch to 14# to increase his ability to hold onto small items with right hand.   Increase active right wrist extension to at least 40 to increase his ability to use tools in right hand.     Increase R1 MP extension to (-) 20, YOUNG of R2 to 175, R3 to 200, R4 to 230, and R5 to 245 to increase his ability to use right hand in his normal daily activities.   Increase right  strength to 40# with wrist in neutral to increase ability to eventually complete yard work.     Long Term Goals:  Time Frame: 1 weeks  Be able to use right hand to adjust heat/air in car without

## 2025-05-20 ENCOUNTER — OFFICE VISIT (OUTPATIENT)
Dept: FAMILY MEDICINE CLINIC | Age: 70
End: 2025-05-20

## 2025-05-20 ENCOUNTER — HOSPITAL ENCOUNTER (OUTPATIENT)
Dept: OCCUPATIONAL THERAPY | Age: 70
Setting detail: THERAPIES SERIES
Discharge: HOME | End: 2025-05-20
Payer: COMMERCIAL

## 2025-05-20 VITALS
DIASTOLIC BLOOD PRESSURE: 88 MMHG | HEART RATE: 68 BPM | RESPIRATION RATE: 14 BRPM | SYSTOLIC BLOOD PRESSURE: 132 MMHG | WEIGHT: 218.13 LBS | HEIGHT: 74 IN | BODY MASS INDEX: 27.99 KG/M2

## 2025-05-20 DIAGNOSIS — J44.9 CHRONIC OBSTRUCTIVE PULMONARY DISEASE, UNSPECIFIED COPD TYPE (HCC): ICD-10-CM

## 2025-05-20 DIAGNOSIS — H11.9 CONJUNCTIVAL LESION: Primary | ICD-10-CM

## 2025-05-20 PROCEDURE — 97022 WHIRLPOOL THERAPY: CPT

## 2025-05-20 PROCEDURE — 97110 THERAPEUTIC EXERCISES: CPT

## 2025-05-20 PROCEDURE — 99213 OFFICE O/P EST LOW 20 MIN: CPT | Performed by: FAMILY MEDICINE

## 2025-05-20 PROCEDURE — 97760 ORTHOTIC MGMT&TRAING 1ST ENC: CPT

## 2025-05-20 PROCEDURE — 1123F ACP DISCUSS/DSCN MKR DOCD: CPT | Performed by: FAMILY MEDICINE

## 2025-05-20 SDOH — ECONOMIC STABILITY: FOOD INSECURITY: WITHIN THE PAST 12 MONTHS, THE FOOD YOU BOUGHT JUST DIDN'T LAST AND YOU DIDN'T HAVE MONEY TO GET MORE.: NEVER TRUE

## 2025-05-20 SDOH — ECONOMIC STABILITY: FOOD INSECURITY: WITHIN THE PAST 12 MONTHS, YOU WORRIED THAT YOUR FOOD WOULD RUN OUT BEFORE YOU GOT MONEY TO BUY MORE.: NEVER TRUE

## 2025-05-20 ASSESSMENT — PATIENT HEALTH QUESTIONNAIRE - PHQ9
SUM OF ALL RESPONSES TO PHQ QUESTIONS 1-9: 0
2. FEELING DOWN, DEPRESSED OR HOPELESS: NOT AT ALL
SUM OF ALL RESPONSES TO PHQ QUESTIONS 1-9: 0
1. LITTLE INTEREST OR PLEASURE IN DOING THINGS: NOT AT ALL
SUM OF ALL RESPONSES TO PHQ QUESTIONS 1-9: 0
SUM OF ALL RESPONSES TO PHQ QUESTIONS 1-9: 0

## 2025-05-20 ASSESSMENT — ENCOUNTER SYMPTOMS
CONSTIPATION: 0
SHORTNESS OF BREATH: 0
SINUS PRESSURE: 0

## 2025-05-20 NOTE — PROGRESS NOTES
[facility]  OCCUPATIONAL THERAPY  [] EVALUATION  [x] DAILY NOTE (LAND) [] DAILY NOTE (AQUATIC ) [] PROGRESS NOTE [] DISCHARGE NOTE    [x] OUTPATIENT REHABILITATION [facility]   [] [facility] CARE [place]    [] [facility]   [] 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  he has a past surgical history that includes transthoracic echocardiogram (2011); Cardiac catheterization (2008); Coronary artery bypass graft (2008); Tonsillectomy; hernia repair; knee surgery (); cardiovascular stress test (2011); transthoracic echocardiogram (2008); Coronary angioplasty with stent (2013); Coronary angioplasty with stent (10/30/2016); and TURP (N/A, 4/15/2023).     Allergies Allergies   Allergen Reactions    Codeine      Heart racing    Darvon [Propoxyphene Hcl]      Heart racing    Statins      Muscle aches      Medications   Current Outpatient Medications:     fluticasone (FLONASE) 50 MCG/ACT nasal spray, 1 spray by Each Nostril route daily, Disp: 3 each, Rfl: 5    acetaminophen (TYLENOL) 500 MG tablet, Take 1 tablet by mouth every 6 hours as needed for Pain, Disp: , Rfl:     Evolocumab

## 2025-05-20 NOTE — PROGRESS NOTES
Kaleb Mederos (:  1955) is a 70 y.o. male,Established patient, here for evaluation of the following chief complaint(s):  Eye Problem (Pt has a \"bubble in his eye\" pt state symptoms started a few days ago, doesn't really bother him but he does notice it. )         Assessment & Plan  Chronic obstructive pulmonary disease, unspecified COPD type (HCC)            Conjunctival lesion       Orders:    Kaleb Hidalgo MD, Opthalmology, [place]             Subjective   HPI  Day before yesterday he noticed a \"Bubble\" in the right eye  No change in vision. No tearing or mattering. Slight itching.  No known cause  Has allergies that are bothering him some.  He has the COPD and sees the pulm.  Review of Systems   Constitutional:  Negative for fatigue.   HENT:  Negative for sinus pressure.    Eyes:  Negative for visual disturbance.   Respiratory:  Negative for shortness of breath.    Cardiovascular:  Negative for chest pain.   Gastrointestinal:  Negative for constipation.   Genitourinary: Negative.    Musculoskeletal:  Negative for arthralgias.   Skin:  Negative for rash.   Neurological:  Negative for headaches.        The patient's medications, allergies, past medical problems, surgical, social, and family histories were reviewed and updated as needed.    Objective   Physical Exam  Constitutional:       Appearance: Normal appearance. He is well-developed.   HENT:      Head: Normocephalic and atraumatic.   Eyes:      General: No scleral icterus.     Conjunctiva/sclera: Conjunctivae normal.   Neck:      Trachea: No tracheal deviation.   Cardiovascular:      Rate and Rhythm: Normal rate.   Pulmonary:      Effort: Pulmonary effort is normal.   Skin:     General: Skin is warm and dry.   Neurological:      General: No focal deficit present.      Mental Status: He is alert.   Psychiatric:         Behavior: Behavior normal.        Blood pressure 132/88, pulse 68, resp. rate 14, height 1.88 m (6' 2\"), weight 98.9 kg (218 lb 2

## 2025-05-29 ENCOUNTER — HOSPITAL ENCOUNTER (OUTPATIENT)
Dept: OCCUPATIONAL THERAPY | Age: 70
Setting detail: THERAPIES SERIES
Discharge: HOME | End: 2025-05-29
Payer: COMMERCIAL

## 2025-05-29 PROCEDURE — 97022 WHIRLPOOL THERAPY: CPT

## 2025-05-29 PROCEDURE — 97110 THERAPEUTIC EXERCISES: CPT

## 2025-05-29 NOTE — PROGRESS NOTES
10     Activities Time    Notes/Comments   In hand manipulation using practice golf ball   Palm to fingertip and fingertip to palm x 10    Information gathered for PN      Used right hand without splint to  and put plastic tapered pegs into pegboard      Splint fabrication - see below   3/4/25  Cut new splint straps for pt's splints due to current straps not holding well   Splint adjustments made to low profile radial nerve palsy splint  x Replaced all finger loops and moleskin for improved and tighter fit for patient; patient related that it felt good at end of session and that it was tighter.   Thumb support for abducted extended position with splint strapping to help for guitar playing           Specific Interventions Next Treatment:  strengthening, ROM    Activity/Treatment Tolerance:  [x]  Patient tolerated treatment well  []  Patient limited by fatigue  []  Patient limited by pain   []  Patient limited by other medical complications  []  Other:     Assessment:  Patient is progressing slowly toward goals. States he is trying to play his guitar more.    GOALS:  Patient Goal: Play the guitar. Use all of my tools normally.    Short Term Goals:  Time Frame: 4 weeks  Be independent with HEP as instructed to increase his ability to use right hand for daily tasks.    Increase right lateral pinch to 20# and 3 point pinch to 14# to increase his ability to hold onto small items with right hand.   Increase active right wrist extension to at least 40 to increase his ability to use tools in right hand.     Increase R1 MP extension to (-) 20, YOUNG of R2 to 175, R3 to 200, R4 to 230, and R5 to 245 to increase his ability to use right hand in his normal daily activities.   Increase right  strength to 40# with wrist in neutral to increase ability to eventually complete yard work.     Long Term Goals:  Time Frame: 1 weeks  Be able to use right hand to adjust heat/air in car without difficulty.   Be able to use right

## 2025-06-05 ENCOUNTER — HOSPITAL ENCOUNTER (OUTPATIENT)
Dept: OCCUPATIONAL THERAPY | Age: 70
Setting detail: THERAPIES SERIES
Discharge: HOME | End: 2025-06-05
Payer: COMMERCIAL

## 2025-06-05 PROCEDURE — 97110 THERAPEUTIC EXERCISES: CPT

## 2025-06-05 PROCEDURE — 97022 WHIRLPOOL THERAPY: CPT

## 2025-06-05 NOTE — PROGRESS NOTES
hand 1 15  New   Coordination maze with right UE 1 10     Activities Time    Notes/Comments   In hand manipulation using practice golf ball   Palm to fingertip and fingertip to palm x 10    ^placed wash cloth on table and pt. Used fingers and thumb to gather into palm and then finger extension/abduction to spread wash cloth back out flat on table  10 reps to gather and flatten x New today   Information gathered for PN      Used right hand without splint to  and put plastic tapered pegs into pegboard      Splint fabrication - see below   3/4/25  Cut new splint straps for pt's splints due to current straps not holding well   Splint adjustments made to low profile radial nerve palsy splint   Replaced all finger loops and moleskin for improved and tighter fit for patient; patient related that it felt good at end of session and that it was tighter.   Thumb support for abducted extended position with splint strapping to help for guitar playing           Specific Interventions Next Treatment:  strengthening, ROM    Activity/Treatment Tolerance:  [x]  Patient tolerated treatment well  []  Patient limited by fatigue  []  Patient limited by pain   []  Patient limited by other medical complications  []  Other:     Assessment:  Patient is progressing slowly toward goals. States he is able to hold his razor with right hand to shave but still not able to apply the shaving cream to his face    GOALS:  Patient Goal: Play the guitar. Use all of my tools normally.    Short Term Goals:  Time Frame: 4 weeks  Be independent with HEP as instructed to increase his ability to use right hand for daily tasks.    Increase right lateral pinch to 20# and 3 point pinch to 14# to increase his ability to hold onto small items with right hand.   Increase active right wrist extension to at least 40 to increase his ability to use tools in right hand.     Increase R1 MP extension to (-) 20, YOUNG of R2 to 175, R3 to 200, R4 to 230, and R5 to 245

## 2025-06-12 ENCOUNTER — HOSPITAL ENCOUNTER (OUTPATIENT)
Dept: OCCUPATIONAL THERAPY | Age: 70
Setting detail: THERAPIES SERIES
Discharge: HOME | End: 2025-06-12
Payer: COMMERCIAL

## 2025-06-12 PROCEDURE — 97022 WHIRLPOOL THERAPY: CPT

## 2025-06-12 PROCEDURE — 97110 THERAPEUTIC EXERCISES: CPT

## 2025-06-12 NOTE — PROGRESS NOTES
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  he has a past surgical history that includes transthoracic echocardiogram (2011); Cardiac catheterization (2008); Coronary artery bypass graft (2008); Tonsillectomy; hernia repair; knee surgery (); cardiovascular stress test (2011); transthoracic echocardiogram (2008); Coronary angioplasty with stent (2013); Coronary angioplasty with stent (10/30/2016); TURP (N/A, 04/15/2023); and lipoma resection (Right, 2025).     Allergies Allergies   Allergen Reactions    Codeine      Heart racing    Darvon [Propoxyphene Hcl]      Heart racing    Statins      Muscle aches      Medications   Current Outpatient Medications:     fluticasone (FLONASE) 50 MCG/ACT nasal spray, 1 spray by Each Nostril route daily, Disp: 3 each, Rfl: 5    acetaminophen (TYLENOL) 500 MG tablet, Take 1 tablet by mouth every 6 hours as needed for

## 2025-06-19 ENCOUNTER — HOSPITAL ENCOUNTER (OUTPATIENT)
Dept: OCCUPATIONAL THERAPY | Age: 70
Setting detail: THERAPIES SERIES
Discharge: HOME | End: 2025-06-19
Payer: COMMERCIAL

## 2025-06-19 PROCEDURE — 97110 THERAPEUTIC EXERCISES: CPT

## 2025-06-19 NOTE — PROGRESS NOTES
* PLEASE SIGN, DATE AND TIME CERTIFICATION BELOW AND RETURN TO [facility] OUTPATIENT REHABILITATION (FAX #: [phone]).  ATTEST/CO-SIGN IF ACCESSING VIA [employer] [employer].  THANK YOU.**    I certify that I have examined the patient below and determined that Physical Medicine and Rehabilitation service is necessary and that I approve the established plan of care for up to 90 days or as specifically noted.  Attestation, signature or co-signature of physician indicates approval of certification requirements.    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  he has a past surgical history that includes transthoracic echocardiogram (2011); Cardiac catheterization (2008); Coronary artery bypass graft (2008); Tonsillectomy; hernia repair; knee surgery (); cardiovascular stress test (2011); transthoracic echocardiogram

## 2025-06-26 ENCOUNTER — OFFICE VISIT (OUTPATIENT)
Dept: FAMILY MEDICINE CLINIC | Age: 70
End: 2025-06-26

## 2025-06-26 VITALS
HEART RATE: 88 BPM | HEIGHT: 74 IN | WEIGHT: 212 LBS | RESPIRATION RATE: 16 BRPM | SYSTOLIC BLOOD PRESSURE: 138 MMHG | BODY MASS INDEX: 27.21 KG/M2 | DIASTOLIC BLOOD PRESSURE: 82 MMHG

## 2025-06-26 DIAGNOSIS — H93.13: Primary | ICD-10-CM

## 2025-06-26 ASSESSMENT — ENCOUNTER SYMPTOMS
CONSTIPATION: 0
SHORTNESS OF BREATH: 0
SINUS PRESSURE: 0
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
## 2025-06-27 ENCOUNTER — HOSPITAL ENCOUNTER (OUTPATIENT)
Dept: AUDIOLOGY | Age: 70
Discharge: HOME | End: 2025-06-27
Payer: COMMERCIAL

## 2025-06-27 PROCEDURE — 92557 COMPREHENSIVE HEARING TEST: CPT | Performed by: AUDIOLOGIST

## 2025-06-27 PROCEDURE — 92567 TYMPANOMETRY: CPT | Performed by: AUDIOLOGIST
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
## 2025-07-10 ENCOUNTER — APPOINTMENT (OUTPATIENT)
Dept: OCCUPATIONAL THERAPY | Age: 70
End: 2025-07-10
Payer: COMMERCIAL

## 2025-07-22 ENCOUNTER — HOSPITAL ENCOUNTER (OUTPATIENT)
Dept: OCCUPATIONAL THERAPY | Age: 70
Setting detail: THERAPIES SERIES
Discharge: HOME | End: 2025-07-22
Payer: COMMERCIAL

## 2025-07-22 PROCEDURE — 97110 THERAPEUTIC EXERCISES: CPT

## 2025-07-22 PROCEDURE — 97112 NEUROMUSCULAR REEDUCATION: CPT

## 2025-07-22 NOTE — PROGRESS NOTES
Patient would like a form for work filled out based on her physical that she had done 1/29/20  I tried to call her and let her know this but she didn't answer and has no voicemail  to use right hand in his normal daily activities. GOAL MET FOR R4 AND R5, GOAL NOT MET FOR R1-3  - SEE ABOVE FOR DETAILS - REVISED GOAL: Increase R1 MP extension to (-)20, R2 to 175, R3 to 200, R4 to 240, and R5 to 260 to increase his ability to use his right hand in his normal daily activities.   Increase right  strength to 40# with wrist in neutral to increase ability to eventually complete yard work. GOAL NOT MET BUT PATIENT IS ABLE TO NOW TEST  STRENGTH WHILE HOLDING RIGHT WRIST IN NEUTRAL AS PREVIOUSLY HE WAS ONLY ABLE TO  WITH SIGNIFICANT SIMULTANEOUS WRIST FLEXION - CONTINUE GOAL    Long Term Goals:  Time Frame: 8 weeks  Be able to use right hand to adjust heat/air in car without difficulty. GOAL NOT MET BUT RELATES THAT THIS HAS BECOME EASIER- RELATES THAT HIS CAR HAS BUTTONS THAT NEED TO BE PUSHED - REVISED GOAL; Be able to push all needed buttons in car (ignition, gear shift, climate control) using right hand without difficulty.   Be able to use right hand to strum guitar with no more than occasional difficulty. GOAL NOT MET - RELATES THAT THIS HAS GOTTEN EASIER BUT HE IS NOT ABLE TO PLAY IN TYPICAL FASHION OR FOR ENTIRE [employer] SERVICE - REVISED GOAL: Be able to finger-pick guitar with right hand for entire [employer] service with no difficulty.  Be able to use hammer and other simple tools in right hand without difficulty. GOAL NOT MET - RELATES THAT IT HAS GOTTEN EASIER BUT CONTINUES TO HAVE DIFFICULTY USING THE TOOLS - CONTINUE GOAL   Be able to don glove on right hand without difficulty. GOAL NOT MET - CONTINUE GOAL    Patient Education:   [x]  HEP/Education Completed:   2/6/25: wear/care of splints fabricated during session  2/25/25: how to use home NMES unit  3/11/25: goal status  3/13/25: possible purchases for home - therabar and  strengthening kit  3/20/25: orange theraputty- gripping with right hand, pulling putty with left hand while holding right wrist neutral  3/28/25: discussed

## 2025-07-31 ENCOUNTER — HOSPITAL ENCOUNTER (OUTPATIENT)
Dept: OCCUPATIONAL THERAPY | Age: 70
Setting detail: THERAPIES SERIES
Discharge: HOME | End: 2025-07-31
Payer: COMMERCIAL

## 2025-07-31 LAB
CHOLEST SERPL-MCNC: 119 MG/DL (ref 0–199)
FASTING: YES
GLUCOSE SERPL-MCNC: 93 MG/DL (ref 74–109)
HDLC SERPL-MCNC: 49 MG/DL (ref 40–90)
LDLC SERPL CALC-MCNC: 45 MG/DL
TRIGL SERPL-MCNC: 125 MG/DL (ref 0–199)

## 2025-07-31 PROCEDURE — 97110 THERAPEUTIC EXERCISES: CPT

## 2025-07-31 PROCEDURE — 97140 MANUAL THERAPY 1/> REGIONS: CPT

## 2025-07-31 NOTE — PROGRESS NOTES
Pain, Disp: , Rfl:     Evolocumab (REPATHA SURECLICK) 140 MG/ML SOAJ, Inject 140 mg into the skin every 14 days, Disp: 6 Adjustable Dose Pre-filled Pen Syringe, Rfl: 1    Budeson-Glycopyrrol-Formoterol (BREZTRI AEROSPHERE) 160-9-4.8 MCG/ACT AERO, Inhale 2 puffs into the lungs in the morning and at bedtime, Disp: 3 each, Rfl: 3    lisinopril (PRINIVIL;ZESTRIL) 10 MG tablet, Take 1 tablet by mouth 2 times daily, Disp: 180 tablet, Rfl: 3    metoprolol tartrate (LOPRESSOR) 50 MG tablet, Take 1 tablet by mouth 2 times daily, Disp: 180 tablet, Rfl: 3    prasugrel (EFFIENT) 10 MG TABS, TAKE ONE TABLET BY MOUTH ONE TIME A DAY, Disp: 90 tablet, Rfl: 3    nitroGLYCERIN (NITROSTAT) 0.4 MG SL tablet, Place 1 tablet under the tongue every 5 minutes as needed for Chest pain, Disp: 25 tablet, Rfl: 3    Misc. Devices (CPAP MACHINE) MISC, by Does not apply route Please add EPR 2-3 for comfort, Disp: 1 each, Rfl: 0    sildenafil (VIAGRA) 50 MG tablet, Take 1 tablet by mouth daily as needed for Erectile Dysfunction, Disp: 12 tablet, Rfl: 0    Coenzyme Q10 (CO Q 10 PO), Take 200 mg by mouth daily, Disp: , Rfl:     therapeutic multivitamin-minerals (THERAGRAN-M) tablet, Take 1 tablet by mouth daily, Disp: , Rfl:     aspirin 81 MG EC tablet, Take 1 tablet by mouth every other day, Disp: , Rfl:       Functional Outcome Measure Used UEFS   Functional Outcome Score 46/80 (2/5/25)  50/80 (6/19/25)      Insurance: Primary: Payor: R /  /  / ,   Secondary:    Authorization Information PRE CERTIFICATION REQUIRED: No  INSURANCE THERAPY BENEFIT:  30 visits per calendar year - not combined with other therapies - soft max.  0 visits have been used so far this year.  For additional visits contact [email or web address]  AQUATIC THERAPY COVERED: Yes  MODALITIES COVERED:  Yes with the exception of ionto or massage    Received auth for 16 additional OT visits from 6/25/25 through 9/24/25. All OT CPT codes covered under \"umbrella.\"    Initial CPT Codes Requested

## 2025-08-07 ENCOUNTER — HOSPITAL ENCOUNTER (OUTPATIENT)
Dept: OCCUPATIONAL THERAPY | Age: 70
Setting detail: THERAPIES SERIES
Discharge: HOME | End: 2025-08-07
Payer: COMMERCIAL

## 2025-08-07 PROCEDURE — 97110 THERAPEUTIC EXERCISES: CPT

## 2025-08-07 PROCEDURE — 97140 MANUAL THERAPY 1/> REGIONS: CPT

## 2025-08-12 ENCOUNTER — APPOINTMENT (OUTPATIENT)
Dept: OCCUPATIONAL THERAPY | Age: 70
End: 2025-08-12
Payer: COMMERCIAL

## 2025-08-13 ENCOUNTER — PHARMACY VISIT (OUTPATIENT)
Age: 70
End: 2025-08-13

## 2025-08-13 DIAGNOSIS — I25.119 CORONARY ARTERY DISEASE WITH ANGINA PECTORIS, UNSPECIFIED VESSEL OR LESION TYPE, UNSPECIFIED WHETHER NATIVE OR TRANSPLANTED HEART: ICD-10-CM

## 2025-08-13 DIAGNOSIS — E78.00 PURE HYPERCHOLESTEROLEMIA: ICD-10-CM

## 2025-08-13 RX ORDER — EVOLOCUMAB 140 MG/ML
140 INJECTION, SOLUTION SUBCUTANEOUS
Qty: 6 ADJUSTABLE DOSE PRE-FILLED PEN SYRINGE | Refills: 1 | Status: SHIPPED | OUTPATIENT
Start: 2025-08-13

## 2025-08-15 ENCOUNTER — HOSPITAL ENCOUNTER (OUTPATIENT)
Dept: OCCUPATIONAL THERAPY | Age: 70
Setting detail: THERAPIES SERIES
Discharge: HOME | End: 2025-08-15
Payer: COMMERCIAL

## 2025-08-15 PROCEDURE — 97140 MANUAL THERAPY 1/> REGIONS: CPT

## 2025-08-15 PROCEDURE — 97110 THERAPEUTIC EXERCISES: CPT

## 2025-08-22 ENCOUNTER — HOSPITAL ENCOUNTER (OUTPATIENT)
Dept: OCCUPATIONAL THERAPY | Age: 70
Setting detail: THERAPIES SERIES
Discharge: HOME | End: 2025-08-22
Payer: COMMERCIAL

## 2025-08-22 PROCEDURE — 97140 MANUAL THERAPY 1/> REGIONS: CPT

## 2025-08-22 PROCEDURE — 97110 THERAPEUTIC EXERCISES: CPT

## 2025-08-29 ENCOUNTER — HOSPITAL ENCOUNTER (OUTPATIENT)
Dept: OCCUPATIONAL THERAPY | Age: 70
Setting detail: THERAPIES SERIES
Discharge: HOME | End: 2025-08-29
Payer: COMMERCIAL

## 2025-08-29 PROCEDURE — 97140 MANUAL THERAPY 1/> REGIONS: CPT

## 2025-08-29 PROCEDURE — 97110 THERAPEUTIC EXERCISES: CPT

## 2025-09-05 ENCOUNTER — HOSPITAL ENCOUNTER (OUTPATIENT)
Dept: OCCUPATIONAL THERAPY | Age: 70
Setting detail: THERAPIES SERIES
Discharge: HOME | End: 2025-09-05
Payer: COMMERCIAL

## 2025-09-05 PROCEDURE — 97110 THERAPEUTIC EXERCISES: CPT

## 2025-09-05 PROCEDURE — 97140 MANUAL THERAPY 1/> REGIONS: CPT

## (undated) DEVICE — CYSTO: Brand: MEDLINE INDUSTRIES, INC.

## (undated) DEVICE — LASER FIBER: Brand: MOXY

## (undated) DEVICE — SYRINGE CATH TIP 50ML

## (undated) DEVICE — SOLUTION IRRIG 1000ML STRL H2O USP PLAS POUR BTL

## (undated) DEVICE — DRAINBAG,ANTI-REFLUX TOWER,L/F,2000ML,LL: Brand: MEDLINE

## (undated) DEVICE — SYRINGE, LUER LOCK, 60ML: Brand: MEDLINE

## (undated) DEVICE — SET IRRIG L94IN ID0.281IN W/ 4.5IN DST FLX CONN 2 LD ON OFF

## (undated) DEVICE — SOLUTION IRRIG 3000ML 0.9% SOD CHL USP UROMATIC PLAS CONT

## (undated) DEVICE — CATHETER,FOLEY,3WAY,SILI-ELAST,20FR,30ML: Brand: MEDLINE